# Patient Record
Sex: FEMALE | Race: BLACK OR AFRICAN AMERICAN | NOT HISPANIC OR LATINO | Employment: FULL TIME | URBAN - METROPOLITAN AREA
[De-identification: names, ages, dates, MRNs, and addresses within clinical notes are randomized per-mention and may not be internally consistent; named-entity substitution may affect disease eponyms.]

---

## 2017-05-30 ENCOUNTER — HOSPITAL ENCOUNTER (EMERGENCY)
Facility: HOSPITAL | Age: 37
Discharge: HOME | End: 2017-05-30
Attending: EMERGENCY MEDICINE | Admitting: EMERGENCY MEDICINE
Payer: MEDICAID

## 2017-05-30 VITALS
SYSTOLIC BLOOD PRESSURE: 132 MMHG | BODY MASS INDEX: 29.07 KG/M2 | HEART RATE: 60 BPM | TEMPERATURE: 98 F | RESPIRATION RATE: 18 BRPM | DIASTOLIC BLOOD PRESSURE: 79 MMHG | OXYGEN SATURATION: 100 % | HEIGHT: 61 IN | WEIGHT: 154 LBS

## 2017-05-30 DIAGNOSIS — R07.9 CHEST PAIN: ICD-10-CM

## 2017-05-30 DIAGNOSIS — R07.89 NON-CARDIAC CHEST PAIN: Primary | ICD-10-CM

## 2017-05-30 LAB
ALBUMIN SERPL BCP-MCNC: 3.6 G/DL
ALP SERPL-CCNC: 52 U/L
ALT SERPL W/O P-5'-P-CCNC: 12 U/L
ANION GAP SERPL CALC-SCNC: 10 MMOL/L
AST SERPL-CCNC: 18 U/L
B-HCG UR QL: NEGATIVE
BASOPHILS # BLD AUTO: 0.01 K/UL
BASOPHILS NFR BLD: 0.2 %
BILIRUB SERPL-MCNC: 0.4 MG/DL
BNP SERPL-MCNC: 17 PG/ML
BUN SERPL-MCNC: 13 MG/DL
CALCIUM SERPL-MCNC: 9.4 MG/DL
CHLORIDE SERPL-SCNC: 106 MMOL/L
CO2 SERPL-SCNC: 26 MMOL/L
CREAT SERPL-MCNC: 0.8 MG/DL
CRP SERPL-MCNC: 0.8 MG/L
CTP QC/QA: YES
D DIMER PPP IA.FEU-MCNC: 3.19 MG/L FEU
DIFFERENTIAL METHOD: ABNORMAL
EOSINOPHIL # BLD AUTO: 0 K/UL
EOSINOPHIL NFR BLD: 0.2 %
ERYTHROCYTE [DISTWIDTH] IN BLOOD BY AUTOMATED COUNT: 15.8 %
ERYTHROCYTE [SEDIMENTATION RATE] IN BLOOD BY WESTERGREN METHOD: 30 MM/HR
EST. GFR  (AFRICAN AMERICAN): >60 ML/MIN/1.73 M^2
EST. GFR  (NON AFRICAN AMERICAN): >60 ML/MIN/1.73 M^2
GLUCOSE SERPL-MCNC: 92 MG/DL
HCT VFR BLD AUTO: 32 %
HGB BLD-MCNC: 9.8 G/DL
LYMPHOCYTES # BLD AUTO: 1.1 K/UL
LYMPHOCYTES NFR BLD: 15.8 %
MCH RBC QN AUTO: 25.7 PG
MCHC RBC AUTO-ENTMCNC: 30.6 %
MCV RBC AUTO: 84 FL
MONOCYTES # BLD AUTO: 0.3 K/UL
MONOCYTES NFR BLD: 4.7 %
NEUTROPHILS # BLD AUTO: 5.3 K/UL
NEUTROPHILS NFR BLD: 79.1 %
PLATELET # BLD AUTO: 254 K/UL
PMV BLD AUTO: 9.6 FL
POTASSIUM SERPL-SCNC: 3.5 MMOL/L
PROT SERPL-MCNC: 7.5 G/DL
RBC # BLD AUTO: 3.82 M/UL
SODIUM SERPL-SCNC: 142 MMOL/L
TROPONIN I SERPL DL<=0.01 NG/ML-MCNC: 0.02 NG/ML
TROPONIN I SERPL DL<=0.01 NG/ML-MCNC: <0.006 NG/ML
WBC # BLD AUTO: 6.64 K/UL

## 2017-05-30 PROCEDURE — 25500020 PHARM REV CODE 255: Performed by: EMERGENCY MEDICINE

## 2017-05-30 PROCEDURE — 93010 ELECTROCARDIOGRAM REPORT: CPT | Mod: ,,, | Performed by: INTERNAL MEDICINE

## 2017-05-30 PROCEDURE — 81025 URINE PREGNANCY TEST: CPT | Performed by: EMERGENCY MEDICINE

## 2017-05-30 PROCEDURE — 63600175 PHARM REV CODE 636 W HCPCS: Performed by: EMERGENCY MEDICINE

## 2017-05-30 PROCEDURE — 99285 EMERGENCY DEPT VISIT HI MDM: CPT | Mod: ,,, | Performed by: EMERGENCY MEDICINE

## 2017-05-30 PROCEDURE — 93005 ELECTROCARDIOGRAM TRACING: CPT

## 2017-05-30 PROCEDURE — 96361 HYDRATE IV INFUSION ADD-ON: CPT

## 2017-05-30 PROCEDURE — 86140 C-REACTIVE PROTEIN: CPT

## 2017-05-30 PROCEDURE — 25000003 PHARM REV CODE 250: Performed by: STUDENT IN AN ORGANIZED HEALTH CARE EDUCATION/TRAINING PROGRAM

## 2017-05-30 PROCEDURE — 85379 FIBRIN DEGRADATION QUANT: CPT

## 2017-05-30 PROCEDURE — 80053 COMPREHEN METABOLIC PANEL: CPT

## 2017-05-30 PROCEDURE — 63600175 PHARM REV CODE 636 W HCPCS: Performed by: STUDENT IN AN ORGANIZED HEALTH CARE EDUCATION/TRAINING PROGRAM

## 2017-05-30 PROCEDURE — 83880 ASSAY OF NATRIURETIC PEPTIDE: CPT

## 2017-05-30 PROCEDURE — 84484 ASSAY OF TROPONIN QUANT: CPT

## 2017-05-30 PROCEDURE — 96375 TX/PRO/DX INJ NEW DRUG ADDON: CPT

## 2017-05-30 PROCEDURE — 85651 RBC SED RATE NONAUTOMATED: CPT

## 2017-05-30 PROCEDURE — 99285 EMERGENCY DEPT VISIT HI MDM: CPT | Mod: 25

## 2017-05-30 PROCEDURE — 85025 COMPLETE CBC W/AUTO DIFF WBC: CPT

## 2017-05-30 PROCEDURE — 96374 THER/PROPH/DIAG INJ IV PUSH: CPT

## 2017-05-30 PROCEDURE — 99282 EMERGENCY DEPT VISIT SF MDM: CPT | Mod: ,,, | Performed by: INTERNAL MEDICINE

## 2017-05-30 RX ORDER — IBUPROFEN 400 MG/1
400 TABLET ORAL EVERY 8 HOURS PRN
Qty: 30 TABLET | Refills: 0 | Status: SHIPPED | OUTPATIENT
Start: 2017-05-30 | End: 2017-08-15

## 2017-05-30 RX ORDER — IBUPROFEN 400 MG/1
400 TABLET ORAL EVERY 8 HOURS PRN
Qty: 30 TABLET | Refills: 0 | Status: SHIPPED | OUTPATIENT
Start: 2017-05-30 | End: 2017-05-30

## 2017-05-30 RX ORDER — PANTOPRAZOLE SODIUM 40 MG/1
40 TABLET, DELAYED RELEASE ORAL DAILY
Qty: 30 TABLET | Refills: 0 | Status: SHIPPED | OUTPATIENT
Start: 2017-05-30 | End: 2017-08-15

## 2017-05-30 RX ORDER — MORPHINE SULFATE 2 MG/ML
4 INJECTION, SOLUTION INTRAMUSCULAR; INTRAVENOUS
Status: COMPLETED | OUTPATIENT
Start: 2017-05-30 | End: 2017-05-30

## 2017-05-30 RX ORDER — ASPIRIN 325 MG
325 TABLET ORAL
Status: DISCONTINUED | OUTPATIENT
Start: 2017-05-30 | End: 2017-05-30

## 2017-05-30 RX ORDER — ONDANSETRON 2 MG/ML
4 INJECTION INTRAMUSCULAR; INTRAVENOUS
Status: COMPLETED | OUTPATIENT
Start: 2017-05-30 | End: 2017-05-30

## 2017-05-30 RX ORDER — DIPHENHYDRAMINE HCL 25 MG
25 CAPSULE ORAL EVERY 6 HOURS PRN
Status: DISCONTINUED | OUTPATIENT
Start: 2017-05-30 | End: 2017-05-30 | Stop reason: HOSPADM

## 2017-05-30 RX ORDER — KETOROLAC TROMETHAMINE 30 MG/ML
15 INJECTION, SOLUTION INTRAMUSCULAR; INTRAVENOUS
Status: COMPLETED | OUTPATIENT
Start: 2017-05-30 | End: 2017-05-30

## 2017-05-30 RX ADMIN — KETOROLAC TROMETHAMINE 15 MG: 30 INJECTION, SOLUTION INTRAMUSCULAR at 09:05

## 2017-05-30 RX ADMIN — DIPHENHYDRAMINE HYDROCHLORIDE 25 MG: 25 CAPSULE ORAL at 11:05

## 2017-05-30 RX ADMIN — SODIUM CHLORIDE 1000 ML: 0.9 INJECTION, SOLUTION INTRAVENOUS at 11:05

## 2017-05-30 RX ADMIN — MORPHINE SULFATE 4 MG: 2 INJECTION, SOLUTION INTRAMUSCULAR; INTRAVENOUS at 11:05

## 2017-05-30 RX ADMIN — IOHEXOL 75 ML: 350 INJECTION, SOLUTION INTRAVENOUS at 02:05

## 2017-05-30 RX ADMIN — ONDANSETRON 4 MG: 2 INJECTION INTRAMUSCULAR; INTRAVENOUS at 12:05

## 2017-05-30 NOTE — ED PROVIDER NOTES
Encounter Date: 2017    SCRIBE #1 NOTE: I, Joseph Quiros, am scribing for, and in the presence of,  Caridad Cabrera MD. I have scribed the following portions of the note - the EKG reading and the Resident attestation. Other sections scribed: CXR Reading.       History     Chief Complaint   Patient presents with    Chest Pain     on r side,     Review of patient's allergies indicates:   Allergen Reactions    Pcn [penicillins]      Mrs. Mckeon is a 36 year old female with a past medical history of HTN and pericardial effusion who presents with a chief complaint of chest pain. She reports that her chest pain began about 45 minutes ago. She states that her pain is right in the center of her chest. She describes the pain as tearing. She says that her pain has been constant since it began. She rates her pain at 10/10. Her pain improves with leaning forward and worsens with leaning back. She also states that breathing makes her pain worse. She says that her symptoms are similar to when she had a pericardial effusion in the past. She is also experiencing shortness of breath and nausea.        The history is provided by the patient.     Past Medical History:   Diagnosis Date    Chronic back pain     Hypertension      Past Surgical History:   Procedure Laterality Date     SECTION, CLASSIC      LIPOSUCTION W/ FAT INJECTION       No family history on file.  Social History   Substance Use Topics    Smoking status: Never Smoker    Smokeless tobacco: Not on file    Alcohol use No     Review of Systems   Constitutional: Negative for chills and fever.   HENT: Negative for congestion, rhinorrhea, sinus pressure and sneezing.    Eyes: Negative for visual disturbance.   Respiratory: Positive for shortness of breath. Negative for cough, chest tightness and wheezing.    Cardiovascular: Positive for chest pain. Negative for palpitations and leg swelling.   Gastrointestinal: Positive for nausea. Negative for abdominal  distention, abdominal pain, constipation, diarrhea and vomiting.   Genitourinary: Negative for decreased urine volume, difficulty urinating, dysuria, flank pain and urgency.   Musculoskeletal: Negative for neck pain and neck stiffness.   Skin: Negative for pallor, rash and wound.   Allergic/Immunologic: Negative for immunocompromised state.   Neurological: Negative for dizziness, tremors, weakness, light-headedness, numbness and headaches.   Hematological: Does not bruise/bleed easily.   Psychiatric/Behavioral: Negative for agitation and behavioral problems.       Physical Exam     Initial Vitals [05/30/17 0820]   BP Pulse Resp Temp SpO2   (!) 151/70 65 18 98.4 °F (36.9 °C) 99 %     Physical Exam    Nursing note and vitals reviewed.  Constitutional: She appears well-developed and well-nourished. She is not diaphoretic. She appears distressed.   HENT:   Head: Normocephalic.   Eyes: Conjunctivae are normal. No scleral icterus.   Neck: Normal range of motion. Neck supple. No JVD present.   Cardiovascular: Normal rate, regular rhythm, normal heart sounds and intact distal pulses. Exam reveals no gallop and no friction rub.    No murmur heard.  Pulmonary/Chest: Breath sounds normal. No respiratory distress. She has no wheezes. She has no rhonchi. She has no rales. She exhibits no tenderness.   Abdominal: Soft. Bowel sounds are normal. She exhibits no distension and no mass. There is no tenderness. There is no rebound and no guarding.   Musculoskeletal: Normal range of motion. She exhibits no edema or tenderness.   Neurological: She is alert and oriented to person, place, and time. She has normal strength and normal reflexes. She displays normal reflexes. No cranial nerve deficit or sensory deficit.   Skin: Skin is warm and dry. No rash noted. No erythema. No pallor.   Psychiatric: She has a normal mood and affect. Thought content normal.         ED Course   Procedures  Labs Reviewed   CBC W/ AUTO DIFFERENTIAL - Abnormal;  Notable for the following:        Result Value    RBC 3.82 (*)     Hemoglobin 9.8 (*)     Hematocrit 32.0 (*)     MCH 25.7 (*)     MCHC 30.6 (*)     RDW 15.8 (*)     Gran% 79.1 (*)     Lymph% 15.8 (*)     All other components within normal limits   COMPREHENSIVE METABOLIC PANEL - Abnormal; Notable for the following:     Alkaline Phosphatase 52 (*)     All other components within normal limits   SEDIMENTATION RATE, MANUAL - Abnormal; Notable for the following:     Sed Rate 30 (*)     All other components within normal limits   TROPONIN I   B-TYPE NATRIURETIC PEPTIDE   C-REACTIVE PROTEIN   D DIMER, QUANTITATIVE   TROPONIN I   POCT URINE PREGNANCY     EKG Readings: (Independently Interpreted)   NSR at 71 with flat T waves throughout. No ST elevation. No old EKG available for comparison       X-Rays:   Independently Interpreted Readings:   Chest X-Ray: Normal heart size. Clear lung fields     Medical Decision Making:   History:   Old Medical Records: I decided to obtain old medical records.  Initial Assessment:   Mrs. Mckeon is a 35 yo F w/ a PMH sig for HTN and a pericardial effusion who presents with acute constant tearing chest pain located at the center of her chest. Her pain is improved positionally. EKG findings without ischemic changes. Physical exam revealed a RRR without rubs, gallops, or murmurs.    Differential Diagnosis:   Differential diagnosis includes pericardial effusion, pericarditis, myocardial infarction, or musculoskeletal pain.       Independently Interpreted Test(s):   I have ordered and independently interpreted X-rays - see prior notes.  I have ordered and independently interpreted EKG Reading(s) - see prior notes  Clinical Tests:   Lab Tests: Ordered and Reviewed  Medical Tests: Ordered and Reviewed  ED Management:  She was seen at 0830.  She was discussed with staff at 0900.  CBC, CMP, BNP, CRP, ESR, Trop, and CXR were ordered.   She was given a 15 mg IV injection of Ketoralac.  EKG without  ischemic changes. EKG shows normal rate and rhythm. No evidence of pericarditis is present.    Lab work reviewed which was remarkable for elevated ESR. CXR without new acute findings. Patient re evaluated now with increasing chest pain and visual disturbances. Patient has no h/o of chronic headaches or migraines. She was given 4 mg of morphine for pain. Cardiology was consulted. D dimer was ordered to rule out PE.   D dimer was elevated. Will order CTA. Discussed case again with cardiology. Will remove consult for now and re order if CTA is negative for PE.   CTA reviewed. There is no evidence for a PE on CTA. In addition, no pericardial effusion was seen. Discussed case with cardiology via [employer] link. They will assess the patient. Consult re ordered.   Cardiology assessed the patient and believes the pain is non cardiac in nature and that she is safe for discharge on NSAIDs w/ close follow up with her PCP.    She will follow up closely with her PCP. Will prescribe NSAIDs and a PPI for treatment. Discussed with patient who is an agreement with plan. Also, instructed patient to return to the ED if her symptoms worsen again.   Other:   I have discussed this case with another health care provider.            Scribe Attestation:   Scribe #1: I performed the above scribed service and the documentation accurately describes the services I performed. I attest to the accuracy of the note.    Attending Attestation:   Physician Attestation Statement for Resident:  As the supervising MD   Physician Attestation Statement: I have personally seen and examined this patient.   I agree with the above history. -: 36 year old woman with Hx of pericarditis with pericardial effusion at outside hospital approximately 1 year ago. Complains of sudden onset right sided chest pain that began this morning while sitting at her desk at work. Pain worse with reclining, better with leaning forward. Worse with deep breathing and swallowing.  Denies fever, chills, cough, SOB. She does report that she yesterday walked on a treadmill for 1 hour and cannot recall the last time she exercised like this. After being in the ED for approximately a few hours, she began to complain of a visual disturbance; reported seeing halos around objects when the light was on. Denies any headache, no hx of migraines   As the supervising MD I agree with the above PE.   -: On exam, pt appears uncomfortable, sitting up in the chair leaning forward holding her chest. PERRL. Exhibits photophobia. Lungs clear to auscultation. Heart regular rate and rhythm. 2/6 systolic murmur. No gallops or rubs. Chest wall non tender.    As the supervising MD I agree with the above treatment, course, plan, and disposition.   -: 36 year old woman with sudden onset anterior chest pain that is positional and pleuritic. DDx includes ACS, Pericarditis, Pericardial effusion, less likely PE. Given her Hx of pericarditis, Sed rate and CRP were sent.   I have reviewed and agree with the residents interpretation of the following: EKG, x-rays and lab data.          Physician Attestation for Scribe:  Physician Attestation Statement for Scribe #1: I, Caridad Cabrera MD, reviewed documentation, as scribed by Joseph Quiros in my presence, and it is both accurate and complete.         Attending ED Notes:   11:36 AM: White count and CRP normal. Sed rate mildly elevated. Pt had no relief with Toradol and EKG and CXR show no findings concerning for pericarditis or pericardial effusion however given patient's Hx and her description of the pain, this is our biggest concern. Will consult cardiology for their evaluation. I have also added a D-dimer.           ED Course     Clinical Impression:   The encounter diagnosis was Chest pain.          Reggie Alvares MD  Resident  05/30/17 [id number]
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
## 2017-05-30 NOTE — ED NOTES
Pt reports blurry vision and worsening chest pain with inhalation.  Pt given pain meds will continue to monitor and make doctor aware.  Pt states she told the nurse in intake about her blurry vision.

## 2017-08-07 ENCOUNTER — TELEPHONE (OUTPATIENT)
Dept: OBSTETRICS AND GYNECOLOGY | Facility: CLINIC | Age: 37
End: 2017-08-07

## 2017-08-07 NOTE — TELEPHONE ENCOUNTER
----- Message from Margarita Lockett sent at 8/7/2017  9:56 AM CDT -----  Contact: self  Patient is wanting to schedule an initial ob appointment, Patient is requesting to be seen at [facility] for her initial due to being an employee for [employer] but is aware the dating ultrasounds are done at [facility]. Patient's LMP is unknown. Patient can be reached at [phone] or [phone].

## 2017-08-09 ENCOUNTER — HOSPITAL ENCOUNTER (EMERGENCY)
Facility: HOSPITAL | Age: 37
Discharge: HOME | End: 2017-08-09
Attending: EMERGENCY MEDICINE | Admitting: EMERGENCY MEDICINE
Payer: MEDICAID

## 2017-08-09 VITALS
BODY MASS INDEX: 30.96 KG/M2 | SYSTOLIC BLOOD PRESSURE: 138 MMHG | OXYGEN SATURATION: 100 % | HEIGHT: 61 IN | HEART RATE: 86 BPM | TEMPERATURE: 99 F | RESPIRATION RATE: 18 BRPM | DIASTOLIC BLOOD PRESSURE: 83 MMHG | WEIGHT: 164 LBS

## 2017-08-09 DIAGNOSIS — O20.0 THREATENED ABORTION IN FIRST TRIMESTER: Primary | ICD-10-CM

## 2017-08-09 DIAGNOSIS — N93.9 VAGINAL BLEEDING: ICD-10-CM

## 2017-08-09 LAB
ABO + RH BLD: NORMAL
ANION GAP SERPL CALC-SCNC: 11 MMOL/L
B-HCG UR QL: POSITIVE
BASOPHILS # BLD AUTO: 0.02 K/UL
BASOPHILS NFR BLD: 0.2 %
BLD GP AB SCN CELLS X3 SERPL QL: NORMAL
BUN SERPL-MCNC: 12 MG/DL
CALCIUM SERPL-MCNC: 10.4 MG/DL
CHLORIDE SERPL-SCNC: 103 MMOL/L
CO2 SERPL-SCNC: 23 MMOL/L
CREAT SERPL-MCNC: 0.8 MG/DL
CTP QC/QA: YES
DIFFERENTIAL METHOD: ABNORMAL
EOSINOPHIL # BLD AUTO: 0.1 K/UL
EOSINOPHIL NFR BLD: 1.5 %
ERYTHROCYTE [DISTWIDTH] IN BLOOD BY AUTOMATED COUNT: 15.6 %
EST. GFR  (AFRICAN AMERICAN): >60 ML/MIN/1.73 M^2
EST. GFR  (NON AFRICAN AMERICAN): >60 ML/MIN/1.73 M^2
GLUCOSE SERPL-MCNC: 99 MG/DL
HCG INTACT+B SERPL-ACNC: 9975 MIU/ML
HCT VFR BLD AUTO: 34.3 %
HGB BLD-MCNC: 10.7 G/DL
LYMPHOCYTES # BLD AUTO: 1.4 K/UL
LYMPHOCYTES NFR BLD: 16.6 %
MCH RBC QN AUTO: 25.8 PG
MCHC RBC AUTO-ENTMCNC: 31.2 G/DL
MCV RBC AUTO: 83 FL
MONOCYTES # BLD AUTO: 0.5 K/UL
MONOCYTES NFR BLD: 6.2 %
NEUTROPHILS # BLD AUTO: 6.1 K/UL
NEUTROPHILS NFR BLD: 75.4 %
PLATELET # BLD AUTO: 302 K/UL
PMV BLD AUTO: 9.9 FL
POTASSIUM SERPL-SCNC: 4.1 MMOL/L
RBC # BLD AUTO: 4.14 M/UL
SODIUM SERPL-SCNC: 137 MMOL/L
WBC # BLD AUTO: 8.12 K/UL

## 2017-08-09 PROCEDURE — 86901 BLOOD TYPING SEROLOGIC RH(D): CPT

## 2017-08-09 PROCEDURE — 99284 EMERGENCY DEPT VISIT MOD MDM: CPT | Mod: ,,, | Performed by: EMERGENCY MEDICINE

## 2017-08-09 PROCEDURE — 80048 BASIC METABOLIC PNL TOTAL CA: CPT

## 2017-08-09 PROCEDURE — 85025 COMPLETE CBC W/AUTO DIFF WBC: CPT

## 2017-08-09 PROCEDURE — 81025 URINE PREGNANCY TEST: CPT | Performed by: EMERGENCY MEDICINE

## 2017-08-09 PROCEDURE — 99284 EMERGENCY DEPT VISIT MOD MDM: CPT | Mod: 25

## 2017-08-09 PROCEDURE — 86900 BLOOD TYPING SEROLOGIC ABO: CPT

## 2017-08-09 PROCEDURE — 84702 CHORIONIC GONADOTROPIN TEST: CPT

## 2017-08-09 NOTE — ED NOTES
Pt returned from ultrasound. Pt states that she walked to ultrasound with escort. Escort did not ask for ticket to ride. Nurse assumed pt left after being seen. Pt placed back on track board.

## 2017-08-09 NOTE — ED TRIAGE NOTES
Pt reports she found out on Saturday she is pregnant. Pt reports she started having a sharp pain yesterday in her abdomen. Pt reports she started having vaginal bleeding today and lower abdominal cramping.

## 2017-08-09 NOTE — ED PROVIDER NOTES
"Encounter Date: 2017    SCRIBE #1 NOTE: I, Justina Pope, am scribing for, and in the presence of, Dr. Mon.       History     Chief Complaint   Patient presents with    Vaginal Bleeding     positive pregnancy test on Saturday. Bleeding started "just now".      Time seen by provider: 10:30 AM    This is a 36 y.o. female with a history of hypertension who presents with complaint of 1 episode of mild vaginal bleeding which occurred prior to arrival. She indicates small amounts of blood in toilet and on the toilet paper. The patient reports associated abdominal cramping.  P/G/A is 1/2/0. She indicates that she is unsure of how far along her current pregnancy is.      The history is provided by the patient.     Review of patient's allergies indicates:   Allergen Reactions    Morphine Hives    Pcn [penicillins]      Past Medical History:   Diagnosis Date    Chronic back pain     Elevated cholesterol     Hypertension     Impaired glucose in pregnancy, antepartum     Migraine      Past Surgical History:   Procedure Laterality Date     SECTION, CLASSIC      x3; all at term    LIPOSUCTION W/ FAT INJECTION       Family History   Problem Relation Age of Onset    Breast cancer Neg Hx     Colon cancer Neg Hx     Ovarian cancer Neg Hx      Social History   Substance Use Topics    Smoking status: Never Smoker    Smokeless tobacco: Never Used    Alcohol use No     Review of Systems   Gastrointestinal:        Abdominal cramping.   Genitourinary: Positive for vaginal bleeding (Mild).       Physical Exam     Initial Vitals [17 0944]   BP Pulse Resp Temp SpO2   (!) 141/82 82 17 98.9 °F (37.2 °C) 99 %      MAP       101.67         Physical Exam    Nursing note and vitals reviewed.  Constitutional: She appears well-developed and well-nourished. She is not diaphoretic. No distress.   Comfortable and well-appearing.   HENT:   Mouth/Throat: Mucous membranes are normal.   Cardiovascular: Normal rate and " regular rhythm. Exam reveals no friction rub.    No murmur heard.  Pulmonary/Chest: Breath sounds normal. No respiratory distress. She has no wheezes. She has no rhonchi. She has no rales.   Comfortable respirations.   Abdominal: There is tenderness (Mild suprapubic).   Genitourinary:   Genitourinary Comments: Small amount of pinkish blood in the vault. Negative for clot or tissue. Cervical os closed. Mild uterine tenderness.   Neurological: She is alert and oriented to person, place, and time. No cranial nerve deficit or sensory deficit.         ED Course   Procedures  Labs Reviewed   CBC W/ AUTO DIFFERENTIAL - Abnormal; Notable for the following:        Result Value    Hemoglobin 10.7 (*)     Hematocrit 34.3 (*)     MCH 25.8 (*)     MCHC 31.2 (*)     RDW 15.6 (*)     Gran% 75.4 (*)     Lymph% 16.6 (*)     All other components within normal limits   POCT URINE PREGNANCY - Abnormal; Notable for the following:     POC Preg Test, Ur Positive (*)     All other components within normal limits   HCG, QUANTITATIVE, PREGNANCY   BASIC METABOLIC PANEL   TYPE & SCREEN             Medical Decision Making:   History:   Old Medical Records: I decided to obtain old medical records.  Initial Assessment:   37 yo f, here with 1st trimester pregnancy/VB, HD stable, mild lower abd cramping.  No IUP on my bedside sono. Cervical os closed, minimal VB on exam    Differential Diagnosis:   Threatened ab vs ectopic  Clinical Tests:   Lab Tests: Ordered and Reviewed  Radiological Study: Ordered and Reviewed  ED Management:  -labs  -sono            Scribe Attestation:   Scribe #1: I performed the above scribed service and the documentation accurately describes the services I performed. I attest to the accuracy of the note.    Attending Attestation:           Physician Attestation for Scribe:  Physician Attestation Statement for Scribe #1: I, Dr. Mon, reviewed documentation, as scribed by Justina Pope in my presence, and it is both accurate  and complete.                 ED Course     12:54 PM  Early IUP  Results discussed with pt  D/c'd home with close OB f/u    Clinical Impression:   The primary encounter diagnosis was Threatened  in first trimester. A diagnosis of Vaginal bleeding was also pertinent to this visit.    Disposition:   Disposition: Discharged  Condition: Stable                        Monica Mon MD  08/15/17 8542

## 2017-08-09 NOTE — ED NOTES
Pt not in the designated area where assigned. Type and screen is pending due to not having enough blood in pink top.

## 2017-08-14 ENCOUNTER — TELEPHONE (OUTPATIENT)
Dept: OBSTETRICS AND GYNECOLOGY | Facility: CLINIC | Age: 37
End: 2017-08-14

## 2017-08-14 DIAGNOSIS — N91.2 AMENORRHEA: Primary | ICD-10-CM

## 2017-08-14 NOTE — TELEPHONE ENCOUNTER
----- Message from Tavo Byers sent at 8/14/2017  9:33 AM CDT -----  Contact: Pt  X_ 1st Request  _ 2nd Request  _ 3rd Request    Who: NATHAN HUMPHREY [[id number]]    Why: Patient would like to speak with staff in regards to tomorrow appointment; patient states she does not want to change it, pregnancy confirm by Ed. Patient LMP 6/22. Patient states she would like to see about dating ultrasound if possible    What Number to Call Back: [phone]    When to Expect a call back: (Before the end of the day)  -- if call after 3:00 call back will be tomorrow.

## 2017-08-15 ENCOUNTER — TELEPHONE (OUTPATIENT)
Dept: MATERNAL FETAL MEDICINE | Facility: CLINIC | Age: 37
End: 2017-08-15

## 2017-08-15 ENCOUNTER — OFFICE VISIT (OUTPATIENT)
Dept: OBSTETRICS AND GYNECOLOGY | Facility: CLINIC | Age: 37
End: 2017-08-15
Payer: MEDICAID

## 2017-08-15 ENCOUNTER — LAB VISIT (OUTPATIENT)
Dept: LAB | Facility: OTHER | Age: 37
End: 2017-08-15
Attending: OBSTETRICS & GYNECOLOGY
Payer: MEDICAID

## 2017-08-15 ENCOUNTER — TELEPHONE (OUTPATIENT)
Dept: OBSTETRICS AND GYNECOLOGY | Facility: CLINIC | Age: 37
End: 2017-08-15

## 2017-08-15 VITALS
WEIGHT: 165.56 LBS | HEIGHT: 61 IN | DIASTOLIC BLOOD PRESSURE: 90 MMHG | SYSTOLIC BLOOD PRESSURE: 140 MMHG | BODY MASS INDEX: 31.26 KG/M2

## 2017-08-15 DIAGNOSIS — O09.90 HIGH RISK PREGNANCY, ANTEPARTUM: ICD-10-CM

## 2017-08-15 DIAGNOSIS — O09.90 HIGH RISK PREGNANCY, ANTEPARTUM: Primary | ICD-10-CM

## 2017-08-15 LAB
ABO + RH BLD: NORMAL
ALBUMIN SERPL BCP-MCNC: 3.6 G/DL
ALP SERPL-CCNC: 51 U/L
ALT SERPL W/O P-5'-P-CCNC: 13 U/L
ANION GAP SERPL CALC-SCNC: 9 MMOL/L
AST SERPL-CCNC: 16 U/L
BILIRUB SERPL-MCNC: 0.3 MG/DL
BLD GP AB SCN CELLS X3 SERPL QL: NORMAL
BUN SERPL-MCNC: 9 MG/DL
CALCIUM SERPL-MCNC: 9.5 MG/DL
CHLORIDE SERPL-SCNC: 103 MMOL/L
CO2 SERPL-SCNC: 22 MMOL/L
CREAT SERPL-MCNC: 0.7 MG/DL
EST. GFR  (AFRICAN AMERICAN): >60 ML/MIN/1.73 M^2
EST. GFR  (NON AFRICAN AMERICAN): >60 ML/MIN/1.73 M^2
GLUCOSE SERPL-MCNC: 137 MG/DL
GLUCOSE SERPL-MCNC: 139 MG/DL
HCG INTACT+B SERPL-ACNC: NORMAL MIU/ML
LDH SERPL L TO P-CCNC: 190 U/L
POTASSIUM SERPL-SCNC: 3.4 MMOL/L
PROT SERPL-MCNC: 7.7 G/DL
SODIUM SERPL-SCNC: 134 MMOL/L

## 2017-08-15 PROCEDURE — 86703 HIV-1/HIV-2 1 RESULT ANTBDY: CPT

## 2017-08-15 PROCEDURE — 86850 RBC ANTIBODY SCREEN: CPT

## 2017-08-15 PROCEDURE — 83615 LACTATE (LD) (LDH) ENZYME: CPT

## 2017-08-15 PROCEDURE — 82950 GLUCOSE TEST: CPT

## 2017-08-15 PROCEDURE — 84702 CHORIONIC GONADOTROPIN TEST: CPT

## 2017-08-15 PROCEDURE — 36415 COLL VENOUS BLD VENIPUNCTURE: CPT

## 2017-08-15 PROCEDURE — 80053 COMPREHEN METABOLIC PANEL: CPT

## 2017-08-15 PROCEDURE — 86762 RUBELLA ANTIBODY: CPT

## 2017-08-15 PROCEDURE — 99999 PR PBB SHADOW E&M-EST. PATIENT-LVL III: CPT | Mod: PBBFAC,,, | Performed by: OBSTETRICS & GYNECOLOGY

## 2017-08-15 PROCEDURE — 87340 HEPATITIS B SURFACE AG IA: CPT

## 2017-08-15 PROCEDURE — 99204 OFFICE O/P NEW MOD 45 MIN: CPT | Mod: TH,S$PBB,, | Performed by: OBSTETRICS & GYNECOLOGY

## 2017-08-15 PROCEDURE — 86900 BLOOD TYPING SEROLOGIC ABO: CPT

## 2017-08-15 PROCEDURE — 3008F BODY MASS INDEX DOCD: CPT | Mod: ,,, | Performed by: OBSTETRICS & GYNECOLOGY

## 2017-08-15 PROCEDURE — 86592 SYPHILIS TEST NON-TREP QUAL: CPT

## 2017-08-15 PROCEDURE — 81220 CFTR GENE COM VARIANTS: CPT

## 2017-08-15 RX ORDER — PYRIDOXINE HCL (VITAMIN B6) 25 MG
TABLET ORAL
Qty: 40 TABLET | Refills: 3 | Status: SHIPPED | OUTPATIENT
Start: 2017-08-15 | End: 2017-10-24

## 2017-08-15 NOTE — PROGRESS NOTES
New OB    SUBJECTIVE:     Chief Complaint: Initial Prenatal Visit       History of Present Illness:  Pt is a 36 y.o. female who presents complaining of amenorrhea.  She is 10w5d based on her LMP.    She does not vaginal bleeding/spotting, does not abdominal pain,  does have nausea, does have vomiting. Patient recently went to the ED for vaginal bleeding that has resolved.  HCG was 9975 and ultrasound showed an intrauterine pregnancy without a fetal pole or yolk sac. Repeat u/s scheduled for .      Significant History:   CHTN: was on HCTZ and norvasc; PCP removed HCTZ  Impaired glucose: Recent 2017 A1c of 5.9  High cholesterol- labs at outside facility  AMA  Migraine headaches- appt with neurology on Friday  History of CS x 3- patient reports could not tie tubes last pregnancy due to scar tissue    Last pap: Normal per patient, 2017 with NP in [facility]; no history of abnormal    Review of patient's allergies indicates:   Allergen Reactions    Morphine Hives    Pcn [penicillins]        Past Medical History:   Diagnosis Date    Chronic back pain     Elevated cholesterol     Hypertension     Impaired glucose in pregnancy, antepartum     Migraine      Past Surgical History:   Procedure Laterality Date     SECTION, CLASSIC      x3; all at term    LIPOSUCTION W/ FAT INJECTION       OB History      Para Term  AB Living    5 3 3   1 3    SAB TAB Ectopic Multiple Live Births    1       3        Family History   Problem Relation Age of Onset    Breast cancer Neg Hx     Colon cancer Neg Hx     Ovarian cancer Neg Hx      Social History   Substance Use Topics    Smoking status: Never Smoker    Smokeless tobacco: Never Used    Alcohol use No       Current Outpatient Prescriptions   Medication Sig    amlodipine (NORVASC) 5 MG tablet Take 5 mg by mouth once daily.    doxylamine succinate (ULTRA SLEEP, DOXYLAMINE SUCC,) 25 mg tablet Take 1 tablet nightly. If still nauseated, add 1/2 a  tablet in the morning and 1/2 a tablet in the afternoon.    PNV,calcium 72-iron-folic acid 27 mg iron- 1 mg Tab Take 1 tablet (1 each total) by mouth once daily.    pyridoxine, vitamin B6, (B-6) 25 MG Tab Take 1 tablet nightly. If still nauseated, add 1/2 a tablet in the morning and 1/2 a tablet in the afternoon.     No current facility-administered medications for this visit.        Review of Systems:  Review of Systems   Constitutional: Negative for fever and unexpected weight change.   Respiratory: Negative for shortness of breath.    Cardiovascular: Negative for chest pain.   Gastrointestinal: Positive for nausea and vomiting. Negative for constipation and diarrhea.   Genitourinary: Negative for dysuria, frequency, menstrual problem, pelvic pain, urgency, vaginal bleeding, vaginal discharge and vaginal pain.   Psychiatric/Behavioral: The patient is not nervous/anxious.         OBJECTIVE:     Physical Exam:  Physical Exam   Constitutional: She is oriented to person, place, and time. She appears well-developed and well-nourished.   Neck: Normal range of motion. Neck supple. No tracheal deviation present. No thyromegaly present.   Cardiovascular: Normal rate, regular rhythm and normal heart sounds.    Pulmonary/Chest: Effort normal and breath sounds normal. Right breast exhibits no inverted nipple, no mass, no nipple discharge, no skin change and no tenderness. Left breast exhibits no inverted nipple, no mass, no nipple discharge, no skin change and no tenderness. Breasts are symmetrical.   Abdominal: Soft.   Genitourinary: Vagina normal. No labial fusion. There is no rash, tenderness, lesion or injury on the right labia. There is no rash, tenderness, lesion or injury on the left labia. Uterus is enlarged. Uterus is not deviated, not fixed and not tender. Cervix exhibits no motion tenderness, no discharge and no friability. Right adnexum displays no mass, no tenderness and no fullness. Left adnexum displays no  mass, no tenderness and no fullness. No erythema, tenderness or bleeding in the vagina. No foreign body in the vagina. No signs of injury around the vagina. No vaginal discharge found.   Genitourinary Comments: Fibroid uterus   Neurological: She is alert and oriented to person, place, and time.   Psychiatric: She has a normal mood and affect. Her behavior is normal. Judgment and thought content normal.   Nursing note and vitals reviewed.        ASSESSMENT:       ICD-10-CM ICD-9-CM    1. High risk pregnancy, antepartum O09.90 V23.9 Type & Screen - Ob Profile      HIV-1 and HIV-2 antibodies      RPR      Hepatitis B surface antigen      Rubella antibody, IgG      Urine culture      C. trachomatis/N. gonorrhoeae by AMP DNA Cervix      doxylamine succinate (ULTRA SLEEP, DOXYLAMINE SUCC,) 25 mg tablet      pyridoxine, vitamin B6, (B-6) 25 MG Tab      PNV,calcium 72-iron-folic acid 27 mg iron- 1 mg Tab      Comprehensive metabolic panel      Protein / creatinine ratio, urine      Lactate dehydrogenase      OB Glucose Screen      Cystic Fibrosis Mutation Panel      hCG, quantitative      Ambulatory consult to Maternal Fetal Medicine      US MF Procedure (Viewpoint)          Plan:      Emily was seen today for initial prenatal visit.    Diagnoses and all orders for this visit:    High risk pregnancy, antepartum  -     Type & Screen - Ob Profile; Future  -     HIV-1 and HIV-2 antibodies; Future  -     RPR; Future  -     Hepatitis B surface antigen; Future  -     Rubella antibody, IgG; Future  -     Urine culture  -     C. trachomatis/N. gonorrhoeae by AMP DNA Cervix  -     doxylamine succinate (ULTRA SLEEP, DOXYLAMINE SUCC,) 25 mg tablet; Take 1 tablet nightly. If still nauseated, add 1/2 a tablet in the morning and 1/2 a tablet in the afternoon.  -     pyridoxine, vitamin B6, (B-6) 25 MG Tab; Take 1 tablet nightly. If still nauseated, add 1/2 a tablet in the morning and 1/2 a tablet in the afternoon.  -     PNV,calcium  72-iron-folic acid 27 mg iron- 1 mg Tab; Take 1 tablet (1 each total) by mouth once daily.  -     Comprehensive metabolic panel; Future  -     Protein / creatinine ratio, urine  -     Lactate dehydrogenase; Future  -     OB Glucose Screen; Future  -     Cystic Fibrosis Mutation Panel; Future  -     hCG, quantitative; Future  -     Ambulatory consult to Maternal Fetal Medicine  -     US MFM Procedure (Viewpoint); Future        Orders Placed This Encounter   Procedures    Urine culture    C. trachomatis/N. gonorrhoeae by AMP DNA Cervix    HIV-1 and HIV-2 antibodies    RPR    Hepatitis B surface antigen    Rubella antibody, IgG    Comprehensive metabolic panel    Protein / creatinine ratio, urine    Lactate dehydrogenase    OB Glucose Screen    Cystic Fibrosis Mutation Panel    hCG, quantitative    Ambulatory consult to Maternal Fetal Medicine    Type & Screen - Ob Profile    US MFM Procedure (Viewpoint)       High risk pregnancy   - U/S and hCG done in the clinic; intrauterine sac with no CRL or yolk sac  - briefly discussed the possibility of miscarriage; will repeat hCG today  - Dating U/S: scheduled  - Recommended Prenatal vitamins with with at least 400 mcg of folic acid (sent to pharmacy)  - First trimester labs: ordered with CF; patient desires Maternti 21  - doxylamine and B6 to pharmacy for N/V    CHTN:   - recommend continue norvasc  - HCTZ discontinued by PCP  - BP slightly elevated today: 140/90  - counseled on the risks of CHTN in pregnancy    Impaired glucose:   - A1c of 5.9 recently;  - ob glucose screen ordered today  - counseled on diabetes in pregnancy    AMA:  - patient is interested in Materniti 21; message sent to MFM  - counseling on the risks of advanced maternal age in pregnancy    History of CS x 3:  - patient reports too difficult to tie tubes with last CS due scar tissue  - previous CS were done at [facility] prior to [place]; op notes not available  - BTL paper  signed today    Referral to [facility] for high risk pregnancy      Counseling: Patient was counseled today on routine 1st trimester precautions, including vaginal bleeding and abdominal pain. We also discussed proper weight gain based on the [place] [employer]'s recommendations based on her pre-pregnancy weight, foods to avoid in pregnancy (i.e. sushi, fish that are high in mercury, cold deli meat, and unpasteurized cheeses), environmental precautions such as cat litter, and prenatal vitamin options (i.e. stool softener, DHA).  Patient given A to Z handbook of pregnancy.    Return in about 1 month (around 9/15/2017) for ob check.    Iliana Domínguez

## 2017-08-15 NOTE — TELEPHONE ENCOUNTER
Nurse phoned patient to discuss her interest in [id number]. Nurse let patient know that once she has her dating ultrasound on August 23rd, she can call [facility] back at [phone] to set up her [id number] blood draw.    Patient verbalized understanding of information received.    ----- Message from Iliana Domínguez MD sent at 8/15/2017 11:21 AM CDT -----  Patient would like to have materniti 21.  Thanks!

## 2017-08-15 NOTE — TELEPHONE ENCOUNTER
----- Message from Monica Brice sent at 8/14/2017  9:37 AM CDT -----  Contact: NATHAN HUMPHREY [[id number]]  x_  1st Request  _  2nd Request  _  3rd Request        Who: NATHAN HUMPHREY [[id number]]    Why: Pt states she would like to schedule her initial OB appointment Pt LMP was 6/5. Patient had a positive pregnancy test on 8/9 from ED. Patient states she would like to keep her same appt for tomorrow if possible.     What Number to Call Back: [phone] or [phone]    When to Expect a call back: (Before the end of the day)   -- if call after 3:00 call back will be tomorrow.

## 2017-08-15 NOTE — TELEPHONE ENCOUNTER
Called pt and informed her that she could still keep her appt for today but we will not be able to schedule her dating us for today because they have no available appts. And also because she might not be as far along with pregnancy as she thinks.

## 2017-08-16 ENCOUNTER — TELEPHONE (OUTPATIENT)
Dept: OBSTETRICS AND GYNECOLOGY | Facility: CLINIC | Age: 37
End: 2017-08-16

## 2017-08-16 LAB
C TRACH DNA SPEC QL NAA+PROBE: NOT DETECTED
CREAT UR-MCNC: 153 MG/DL
HBV SURFACE AG SERPL QL IA: NEGATIVE
HIV 1+2 AB+HIV1 P24 AG SERPL QL IA: NEGATIVE
N GONORRHOEA DNA SPEC QL NAA+PROBE: NOT DETECTED
PROT UR-MCNC: 18 MG/DL
PROT/CREAT RATIO, UR: 0.12
RPR SER QL: NORMAL
RUBV IGG SER-ACNC: 27.6 IU/ML
RUBV IGG SER-IMP: REACTIVE

## 2017-08-16 NOTE — TELEPHONE ENCOUNTER
Called pt and informed her that I talked to Dr. Domínguez and she said that spotting is normal in the beginning of pregnancy and to just watch to make sure that she does not start soaking up a pad with blood. Also informed her that her levels have gone up so that is a good sign. But did tell her that the levels rising does not mean that this pregnancy will not be a miscarriage. We will f/u with pt dating us on 8/23. Pt verbalized understanding.

## 2017-08-16 NOTE — TELEPHONE ENCOUNTER
Called pt and she states that she started spotting again today while at work. Pt denied pelvic pain. Pt is having mild cramps that feels like period cramping. No head aches or spots in vision. Bleeding does not fill pad within an hour. Pt went to ER yesterday. Pt was also seen by us in clinic yesterday. Will advise Dr. Domínguez.

## 2017-08-18 LAB — CFTR MUT ANL BLD/T: NORMAL

## 2017-08-19 LAB
BACTERIA UR CULT: NORMAL
BACTERIA UR CULT: NORMAL

## 2017-08-21 ENCOUNTER — PATIENT MESSAGE (OUTPATIENT)
Dept: OBSTETRICS AND GYNECOLOGY | Facility: CLINIC | Age: 37
End: 2017-08-21

## 2017-08-21 DIAGNOSIS — N39.0 URINARY TRACT INFECTION WITHOUT HEMATURIA, SITE UNSPECIFIED: Primary | ICD-10-CM

## 2017-08-21 RX ORDER — NITROFURANTOIN 25; 75 MG/1; MG/1
100 CAPSULE ORAL 2 TIMES DAILY
Qty: 14 CAPSULE | Refills: 0 | Status: SHIPPED | OUTPATIENT
Start: 2017-08-21 | End: 2017-08-28

## 2017-08-23 ENCOUNTER — PROCEDURE VISIT (OUTPATIENT)
Dept: OBSTETRICS AND GYNECOLOGY | Facility: CLINIC | Age: 37
End: 2017-08-23
Payer: MEDICAID

## 2017-08-23 DIAGNOSIS — D25.9 LEIOMYOMA IN PREGNANCY, UTERINE, ANTEPARTUM: ICD-10-CM

## 2017-08-23 DIAGNOSIS — Z36.89 ESTABLISH GESTATIONAL AGE, ULTRASOUND: ICD-10-CM

## 2017-08-23 DIAGNOSIS — O34.10 LEIOMYOMA IN PREGNANCY, UTERINE, ANTEPARTUM: ICD-10-CM

## 2017-08-23 DIAGNOSIS — N91.2 AMENORRHEA: ICD-10-CM

## 2017-08-23 PROCEDURE — 76817 TRANSVAGINAL US OBSTETRIC: CPT | Mod: PBBFAC | Performed by: OBSTETRICS & GYNECOLOGY

## 2017-08-23 PROCEDURE — 76817 TRANSVAGINAL US OBSTETRIC: CPT | Mod: 26,S$PBB,, | Performed by: OBSTETRICS & GYNECOLOGY

## 2017-08-23 NOTE — PROCEDURES
Procedures   Obstetrical ultrasound completed today.  See report in imaging section of [facility].

## 2017-09-06 ENCOUNTER — HOSPITAL ENCOUNTER (EMERGENCY)
Facility: OTHER | Age: 37
Discharge: HOME | End: 2017-09-06
Attending: EMERGENCY MEDICINE
Payer: MEDICAID

## 2017-09-06 VITALS
RESPIRATION RATE: 16 BRPM | WEIGHT: 166 LBS | SYSTOLIC BLOOD PRESSURE: 198 MMHG | OXYGEN SATURATION: 100 % | BODY MASS INDEX: 31.34 KG/M2 | DIASTOLIC BLOOD PRESSURE: 88 MMHG | TEMPERATURE: 99 F | HEART RATE: 69 BPM | HEIGHT: 61 IN

## 2017-09-06 DIAGNOSIS — R51.9 ACUTE NONINTRACTABLE HEADACHE, UNSPECIFIED HEADACHE TYPE: Primary | ICD-10-CM

## 2017-09-06 DIAGNOSIS — R10.9 ABDOMINAL PAIN IN PREGNANCY, SECOND TRIMESTER: ICD-10-CM

## 2017-09-06 DIAGNOSIS — O26.892 ABDOMINAL PAIN IN PREGNANCY, SECOND TRIMESTER: ICD-10-CM

## 2017-09-06 DIAGNOSIS — R11.2 NON-INTRACTABLE VOMITING WITH NAUSEA, UNSPECIFIED VOMITING TYPE: ICD-10-CM

## 2017-09-06 DIAGNOSIS — R07.9 CHEST PAIN: ICD-10-CM

## 2017-09-06 LAB
B-HCG UR QL: POSITIVE
BILIRUB UR QL STRIP: NEGATIVE
CLARITY UR: CLEAR
COLOR UR: YELLOW
CTP QC/QA: YES
GLUCOSE UR QL STRIP: NEGATIVE
HGB UR QL STRIP: NEGATIVE
KETONES UR QL STRIP: NEGATIVE
LEUKOCYTE ESTERASE UR QL STRIP: NEGATIVE
NITRITE UR QL STRIP: NEGATIVE
PH UR STRIP: 7 [PH] (ref 5–8)
PROT UR QL STRIP: NEGATIVE
SP GR UR STRIP: 1.02 (ref 1–1.03)
URN SPEC COLLECT METH UR: NORMAL
UROBILINOGEN UR STRIP-ACNC: NEGATIVE EU/DL

## 2017-09-06 PROCEDURE — 25000003 PHARM REV CODE 250: Performed by: PHYSICIAN ASSISTANT

## 2017-09-06 PROCEDURE — 81003 URINALYSIS AUTO W/O SCOPE: CPT

## 2017-09-06 PROCEDURE — 93005 ELECTROCARDIOGRAM TRACING: CPT

## 2017-09-06 PROCEDURE — 81025 URINE PREGNANCY TEST: CPT | Performed by: EMERGENCY MEDICINE

## 2017-09-06 PROCEDURE — 99284 EMERGENCY DEPT VISIT MOD MDM: CPT | Mod: 25

## 2017-09-06 PROCEDURE — 93010 ELECTROCARDIOGRAM REPORT: CPT | Mod: ,,, | Performed by: INTERNAL MEDICINE

## 2017-09-06 RX ORDER — FAMOTIDINE 20 MG/1
20 TABLET, FILM COATED ORAL
Status: COMPLETED | OUTPATIENT
Start: 2017-09-06 | End: 2017-09-06

## 2017-09-06 RX ORDER — METOCLOPRAMIDE 10 MG/1
10 TABLET ORAL EVERY 6 HOURS PRN
Qty: 30 TABLET | Refills: 0 | Status: SHIPPED | OUTPATIENT
Start: 2017-09-06 | End: 2017-10-03 | Stop reason: SDUPTHER

## 2017-09-06 RX ORDER — ACETAMINOPHEN 500 MG
1000 TABLET ORAL
Status: COMPLETED | OUTPATIENT
Start: 2017-09-06 | End: 2017-09-06

## 2017-09-06 RX ORDER — FAMOTIDINE 20 MG/1
20 TABLET, FILM COATED ORAL 2 TIMES DAILY
Qty: 20 TABLET | Refills: 0 | Status: SHIPPED | OUTPATIENT
Start: 2017-09-06 | End: 2017-10-24

## 2017-09-06 RX ORDER — METOCLOPRAMIDE 10 MG/1
10 TABLET ORAL
Status: COMPLETED | OUTPATIENT
Start: 2017-09-06 | End: 2017-09-06

## 2017-09-06 RX ORDER — ACETAMINOPHEN 500 MG
500 TABLET ORAL
Status: COMPLETED | OUTPATIENT
Start: 2017-09-06 | End: 2017-09-06

## 2017-09-06 RX ADMIN — ACETAMINOPHEN 500 MG: 500 TABLET ORAL at 08:09

## 2017-09-06 RX ADMIN — FAMOTIDINE 20 MG: 20 TABLET, FILM COATED ORAL at 08:09

## 2017-09-06 RX ADMIN — METOCLOPRAMIDE 10 MG: 10 TABLET ORAL at 08:09

## 2017-09-06 RX ADMIN — ACETAMINOPHEN 1000 MG: 500 TABLET ORAL at 08:09

## 2017-09-06 NOTE — ED NOTES
"Pt reports to triage nurse stating. " I forgot to tell you I have chest pains earlier". Pt denies new onset stating," My chest has been hurting since yesterday". + Left sided intermittent chest "presure" reports pain 6/10 on pain scale. Denies SOB at this time. ECG ordered at this time.  "

## 2017-09-07 NOTE — ED TRIAGE NOTES
Pt complaining of headache since last night, took tylenol and it helped but then headache came back.  Pt states began having abdominal pain and chest pain today.  Nausea, and left ear pain as well.

## 2017-09-07 NOTE — ED PROVIDER NOTES
"Encounter Date: 2017       History     Chief Complaint   Patient presents with    Abdominal Pain     + constant generalzied abdominal pains after eating breakfast this morning. + generalzied HA " I went to the [facility] and they gave me two Tylenols which helped only for about two hours". + left ear pain w/ new onset this morning. + nausea w/out vomiting. Last BM reported yesterday as normal. Denies dysuria or hematuria     37-year-old female who is approximately 15 weeks gestation and admits to being  A2 presents emergency department with multiple complaints including upper abdominal pain, chest pain and headache.  She also reports nausea and vomiting.  States the symptoms started this morning after breakfast.  She states that she took Tylenol which relieved her headache however it came back shortly after.  He states that she is currently on doxycycline just for her nausea and vomiting however admits that she "is about to stop taking it because it causes me to be constipated." She denies any vaginal bleeding, discharge, urinary symptoms, fever, chills, shortness of breath. She reports palpation ultrasound was obtained by her OB/GYN a few weeks ago.  Her last OB/GYN visit was on the  of last month      The history is provided by the patient.     Review of patient's allergies indicates:   Allergen Reactions    Morphine Hives    Pcn [penicillins]      Past Medical History:   Diagnosis Date    Chronic back pain     Elevated cholesterol     Hypertension     Impaired glucose in pregnancy, antepartum     Migraine      Past Surgical History:   Procedure Laterality Date     SECTION, CLASSIC      x3; all at term    LIPOSUCTION W/ FAT INJECTION       Family History   Problem Relation Age of Onset    Breast cancer Neg Hx     Colon cancer Neg Hx     Ovarian cancer Neg Hx      Social History   Substance Use Topics    Smoking status: Never Smoker    Smokeless tobacco: Never Used    Alcohol use " No     Review of Systems   Constitutional: Negative for chills and fever.   HENT: Negative for sore throat.    Respiratory: Negative for shortness of breath.    Cardiovascular: Positive for chest pain. Negative for leg swelling.   Gastrointestinal: Positive for abdominal pain, nausea and vomiting. Negative for diarrhea.   Genitourinary: Negative for dysuria.   Musculoskeletal: Negative for back pain.   Skin: Negative for rash.   Neurological: Positive for headaches. Negative for dizziness, syncope, weakness, light-headedness and numbness.   Hematological: Does not bruise/bleed easily.       Physical Exam     Initial Vitals [09/06/17 1726]   BP Pulse Resp Temp SpO2   (!) 165/78 71 16 98.4 °F (36.9 °C) 100 %      MAP       107         Physical Exam    Nursing note and vitals reviewed.  Constitutional: Vital signs are normal. She appears well-developed and well-nourished.  Non-toxic appearance. No distress.   HENT:   Head: Normocephalic and atraumatic.   Right Ear: External ear normal.   Left Ear: External ear normal.   Nose: Nose normal.   Mouth/Throat: Oropharynx is clear and moist.   Eyes: Conjunctivae and lids are normal. No scleral icterus.   Neck: Normal range of motion and phonation normal. Neck supple.   Cardiovascular: Normal rate, regular rhythm and normal heart sounds. Exam reveals no gallop and no friction rub.    No murmur heard.  No pitting edema   Pulmonary/Chest: Effort normal and breath sounds normal. No respiratory distress. She has no decreased breath sounds. She has no wheezes. She has no rhonchi. She has no rales.   Abdominal: Soft. Normal appearance and bowel sounds are normal. She exhibits distension (gravid uterus). There is no tenderness. There is no rigidity, no rebound, no guarding, no CVA tenderness, no tenderness at McBurney's point and negative Chapa's sign.       Musculoskeletal: Normal range of motion.   No obvious deformities, moving all extremities, normal gait   Neurological: She is  alert and oriented to person, place, and time. She has normal strength. No sensory deficit.   Skin: Skin is warm, dry and intact. No lesion and no rash noted. No erythema.   Psychiatric: She has a normal mood and affect. Her speech is normal and behavior is normal. Judgment normal. Cognition and memory are normal.         ED Course   Procedures  Labs Reviewed   POCT URINE PREGNANCY - Abnormal; Notable for the following:        Result Value    POC Preg Test, Ur Positive (*)     All other components within normal limits   URINALYSIS     EKG Readings: (Independently Interpreted)   Initial Reading: No STEMI. Rhythm: Normal Sinus Rhythm. Heart Rate: 65. Ectopy: No Ectopy. Conduction: Normal. ST Segments: Normal ST Segments. T Waves: Normal. Clinical Impression: Normal Sinus Rhythm          Medical Decision Making:   History:   Old Medical Records: I decided to obtain old medical records.  Initial Assessment:   37-year-old female with complaints consistent with upper abdominal pain with associated nausea, vomiting, chest pain and headache and pregnancy.  Afebrile and neurovascularly intact.  She is alert, healthy and nontoxic appearing.  She is in no apparent distress.  Chest and a gravid uterus.  Abdomen is otherwise soft and nontender.  The pain is mostly to the upper abdomen and epigastric region.  I do believe that this pain is likely secondary to gastritis versus acid reflux.  Patient also reports headache.  No focal neurological deficits. Patient does have documented IUP.  She denies vaginal bleeding or discharge.  Independently Interpreted Test(s):   I have ordered and independently interpreted EKG Reading(s) - see prior notes  Clinical Tests:   Lab Tests: Ordered and Reviewed  Medical Tests: Ordered and Reviewed  ED Management:  Patient did tell triage nurse that she was experiencing some chest pain.  EKG was obtained and independently interpreted by myself and consistent with normal sinus rhythm with no evidence  of acute ischemia or STEMI. UPT and urinalysis obtained.  Urinalysis is no evidence of serious bacterial infection, UTI pyelonephritis.  Fetal heart tones within normal limits.  She was administered Reglan, Pepcid and Tylenol.  Patient did develop one of the Tylenol.  She does state that her nausea is improved as well as some of her abdominal pain.  I do believe that her symptoms of pain are likely secondary to gastritis.  She is urged bland diet.  Will prescribe Reglan and Pepcid for home.  Patient was given a second dose of 500 mg Tylenol that she did have an episode of emesis from the first dose.  She is urged Tylenol at home for headaches as directed on packaging.  She is follow-up with OB/GYN in the next 48 hours or return for any worsening signs or symptoms.  This patient was discussed with the attending physician who agrees with treatment plan.  Other:   I have discussed this case with another health care provider.       <> Summary of the Discussion: Guille  This note was created using Dragon Medical dictation.  There may be typographical errors secondary to dictation.                     ED Course      Clinical Impression:     1. Acute nonintractable headache, unspecified headache type    2. Chest pain    3. Non-intractable vomiting with nausea, unspecified vomiting type    4. Abdominal pain in pregnancy, second trimester        Disposition:   Disposition: Discharged  Condition: Stable                        Kinsey Wagner PA-C  09/06/17 2037

## 2017-09-07 NOTE — DISCHARGE INSTRUCTIONS
Can take Tylenol as directed on packaging for your headache.  Take medications as prescribed for nausea, vomiting and pain.  Follow-up with your OB/GYN.

## 2017-09-12 ENCOUNTER — TELEPHONE (OUTPATIENT)
Dept: OBSTETRICS AND GYNECOLOGY | Facility: CLINIC | Age: 37
End: 2017-09-12

## 2017-09-12 NOTE — TELEPHONE ENCOUNTER
Called pt back and she informed me that she has been having cramps in lower abd. Pt states that she thinks these cramps are contractions. I asked pt when the last time was that she had a bowel movement. Pt can not remember said that it has been days. I informed pt to take the COLACE that she was prescribed and call be back later to let me know how she is feeling once she has a movement. Pt verbalized understanding.

## 2017-09-12 NOTE — TELEPHONE ENCOUNTER
----- Message from George Palafox sent at 9/12/2017  8:13 AM CDT -----  15 wks ob pt having a lot of pain [phone]

## 2017-09-12 NOTE — TELEPHONE ENCOUNTER
----- Message from Brennon Mendez sent at 9/12/2017 10:49 AM CDT -----  X_  1st Request  _  2nd Request  _  3rd Request        Who: NATHAN HUMPHREY [[id number]]    Why: Pt returning a call from Barbara. Please call back.    What Number to Call Back:[phone]    When to Expect a call back: (With in 24 hours)

## 2017-09-20 ENCOUNTER — ROUTINE PRENATAL (OUTPATIENT)
Dept: OBSTETRICS AND GYNECOLOGY | Facility: CLINIC | Age: 37
End: 2017-09-20
Payer: MEDICAID

## 2017-09-20 ENCOUNTER — PATIENT MESSAGE (OUTPATIENT)
Dept: OBSTETRICS AND GYNECOLOGY | Facility: CLINIC | Age: 37
End: 2017-09-20

## 2017-09-20 VITALS — BODY MASS INDEX: 31.2 KG/M2 | SYSTOLIC BLOOD PRESSURE: 130 MMHG | WEIGHT: 165.13 LBS | DIASTOLIC BLOOD PRESSURE: 80 MMHG

## 2017-09-20 DIAGNOSIS — G44.219 EPISODIC TENSION-TYPE HEADACHE, NOT INTRACTABLE: ICD-10-CM

## 2017-09-20 DIAGNOSIS — Z34.82 NORMAL PREGNANCY IN MULTIGRAVIDA IN SECOND TRIMESTER: ICD-10-CM

## 2017-09-20 DIAGNOSIS — Z98.891 H/O CESAREAN SECTION: ICD-10-CM

## 2017-09-20 DIAGNOSIS — O99.810 IMPAIRED GLUCOSE IN PREGNANCY, ANTEPARTUM: ICD-10-CM

## 2017-09-20 DIAGNOSIS — O09.522 ELDERLY MULTIGRAVIDA IN SECOND TRIMESTER: ICD-10-CM

## 2017-09-20 DIAGNOSIS — O10.919 CHRONIC HYPERTENSION AFFECTING PREGNANCY: ICD-10-CM

## 2017-09-20 PROBLEM — G43.009 MIGRAINE WITHOUT AURA AND WITHOUT STATUS MIGRAINOSUS, NOT INTRACTABLE: Status: ACTIVE | Noted: 2017-09-20

## 2017-09-20 PROBLEM — G44.209 TENSION TYPE HEADACHE: Status: ACTIVE | Noted: 2017-09-20

## 2017-09-20 PROCEDURE — 99213 OFFICE O/P EST LOW 20 MIN: CPT | Mod: TH,S$PBB,, | Performed by: OBSTETRICS & GYNECOLOGY

## 2017-09-20 PROCEDURE — 3008F BODY MASS INDEX DOCD: CPT | Mod: ,,, | Performed by: OBSTETRICS & GYNECOLOGY

## 2017-09-20 PROCEDURE — 99213 OFFICE O/P EST LOW 20 MIN: CPT | Mod: PBBFAC | Performed by: OBSTETRICS & GYNECOLOGY

## 2017-09-20 PROCEDURE — 99999 PR PBB SHADOW E&M-EST. PATIENT-LVL III: CPT | Mod: PBBFAC,,, | Performed by: OBSTETRICS & GYNECOLOGY

## 2017-09-20 RX ORDER — BUTALBITAL, ACETAMINOPHEN AND CAFFEINE 50; 325; 40 MG/1; MG/1; MG/1
1 TABLET ORAL EVERY 4 HOURS PRN
Qty: 30 TABLET | Refills: 0 | Status: SHIPPED | OUTPATIENT
Start: 2017-09-20 | End: 2017-10-20

## 2017-09-20 NOTE — PROGRESS NOTES
Slight fetal movement.  No cramping, no vaginal bleeding, and no loss of fluid.    Constipation: Patient is very constipated.  Reports that she stopped prenatals, iron, etc.  And is now using prune juice and colace.  Bowel movements are now 2x per day.  Most recent bowel movement had blood spots in the toilet.  Was not straining before having blood spots int he toilet.  Blood spots have since resolved.  Recommended daily fiber and given instructions on use.    CHTN: last time, patient told me that she was no longer taking the HCTZ, but she reports that she actually stopped the norvasc.    Headaches: patient reports bilateral, squeezing discomfort.  Occur daily.  Takes tylenol- two regular strength- without relief.  Discussed dosing of tylenol, maximum dose and toxic levels.  Reports what works for her is fioricet and aleve.  Will prescribe fioricet as a prn.    It appears the patient missed a consult with M.  She reports that she was told at her first ultrasound not to go.  Will schedule again with anatomy scan.

## 2017-09-25 ENCOUNTER — TELEPHONE (OUTPATIENT)
Dept: OBSTETRICS AND GYNECOLOGY | Facility: CLINIC | Age: 37
End: 2017-09-25

## 2017-09-25 NOTE — TELEPHONE ENCOUNTER
----- Message from Ramsey Butler sent at 9/25/2017  9:12 AM CDT -----  Contact: Patient   x_  1st Request  _  2nd Request  _  3rd Request        Who: NATHAN HUMPHREY [[id number]]    Why: Requesting a call back in regards to discussing rx for yeast infection.   Please return the call at earliest convenience. Thanks!    What Number to Call Back:[phone] (M)    When to Expect a call back: (Within 24 hours)

## 2017-09-25 NOTE — TELEPHONE ENCOUNTER
Called pt and she states that the MONISTAT is not helping wither her yeast infection. Will inform Dr. Domínguez. Pt verbalized understanding.

## 2017-09-26 NOTE — TELEPHONE ENCOUNTER
Called pt and scheduled appt with GEOFFREY on Thursday for her yeast infection. Pt verbalized understanding.

## 2017-10-03 ENCOUNTER — HOSPITAL ENCOUNTER (EMERGENCY)
Facility: OTHER | Age: 37
Discharge: HOME | End: 2017-10-03
Attending: EMERGENCY MEDICINE
Payer: MEDICAID

## 2017-10-03 VITALS
HEART RATE: 67 BPM | WEIGHT: 163 LBS | RESPIRATION RATE: 16 BRPM | TEMPERATURE: 98 F | BODY MASS INDEX: 30.78 KG/M2 | HEIGHT: 61 IN | SYSTOLIC BLOOD PRESSURE: 120 MMHG | OXYGEN SATURATION: 100 % | DIASTOLIC BLOOD PRESSURE: 58 MMHG

## 2017-10-03 DIAGNOSIS — R51.9 ACUTE NONINTRACTABLE HEADACHE, UNSPECIFIED HEADACHE TYPE: Primary | ICD-10-CM

## 2017-10-03 PROCEDURE — 99283 EMERGENCY DEPT VISIT LOW MDM: CPT

## 2017-10-03 PROCEDURE — 25000003 PHARM REV CODE 250: Performed by: PHYSICIAN ASSISTANT

## 2017-10-03 PROCEDURE — 25000242 PHARM REV CODE 250 ALT 637 W/ HCPCS: Performed by: EMERGENCY MEDICINE

## 2017-10-03 RX ORDER — FLUTICASONE PROPIONATE 50 MCG
1 SPRAY, SUSPENSION (ML) NASAL 2 TIMES DAILY PRN
Qty: 15 G | Refills: 0 | Status: SHIPPED | OUTPATIENT
Start: 2017-10-03 | End: 2018-04-25

## 2017-10-03 RX ORDER — CETIRIZINE HYDROCHLORIDE 10 MG/1
10 TABLET ORAL DAILY
Qty: 30 TABLET | Refills: 0 | Status: SHIPPED | OUTPATIENT
Start: 2017-10-03 | End: 2018-04-25

## 2017-10-03 RX ORDER — METOCLOPRAMIDE 10 MG/1
10 TABLET ORAL
Status: COMPLETED | OUTPATIENT
Start: 2017-10-03 | End: 2017-10-03

## 2017-10-03 RX ORDER — FLUTICASONE PROPIONATE 50 MCG
2 SPRAY, SUSPENSION (ML) NASAL DAILY
Status: DISCONTINUED | OUTPATIENT
Start: 2017-10-04 | End: 2017-10-03

## 2017-10-03 RX ORDER — FLUTICASONE PROPIONATE 50 MCG
2 SPRAY, SUSPENSION (ML) NASAL DAILY
Status: DISCONTINUED | OUTPATIENT
Start: 2017-10-03 | End: 2017-10-04 | Stop reason: HOSPADM

## 2017-10-03 RX ORDER — METOCLOPRAMIDE 10 MG/1
10 TABLET ORAL EVERY 6 HOURS PRN
Qty: 30 TABLET | Refills: 0 | Status: SHIPPED | OUTPATIENT
Start: 2017-10-03 | End: 2017-10-24

## 2017-10-03 RX ORDER — CETIRIZINE HYDROCHLORIDE 10 MG/1
10 TABLET ORAL
Status: COMPLETED | OUTPATIENT
Start: 2017-10-03 | End: 2017-10-03

## 2017-10-03 RX ADMIN — METOCLOPRAMIDE 10 MG: 10 TABLET ORAL at 11:10

## 2017-10-03 RX ADMIN — CETIRIZINE HYDROCHLORIDE 10 MG: 10 TABLET, FILM COATED ORAL at 11:10

## 2017-10-03 RX ADMIN — FLUTICASONE PROPIONATE 2 SPRAY: 50 SPRAY, METERED NASAL at 11:10

## 2017-10-04 NOTE — ED PROVIDER NOTES
Encounter Date: 10/3/2017       History     Chief Complaint   Patient presents with    Headache     Patient is 18 weeks pregnant.  Patient c/o a frontal headache for 2 weeks.  Patient has been taking tylenol and fioricet with no relief.  Patient also reporting nausea, vomiting and dizziness.      37-year-old female who is 18 weeks gestation presents emergency department with complaints of headache.  She states the headache is been present for the last 2 weeks.  She states that she's been treating with Tylenol and Fioricet with no relief.  She reports some associated nausea, vomiting and dizziness.  She denies nasal congestion, cough, fever, chills, trauma, injury, neck pain, stiffness, confusion.  She complains of pain is a 10 out of 10.  She states the pain is located across the forehead.      The history is provided by the patient.     Review of patient's allergies indicates:   Allergen Reactions    Morphine Hives    Pcn [penicillins]      Past Medical History:   Diagnosis Date    Chronic back pain     Elevated cholesterol     Hypertension     Impaired glucose in pregnancy, antepartum     Migraine      Past Surgical History:   Procedure Laterality Date     SECTION, CLASSIC      x3; all at term    LIPOSUCTION W/ FAT INJECTION       Family History   Problem Relation Age of Onset    Breast cancer Neg Hx     Colon cancer Neg Hx     Ovarian cancer Neg Hx      Social History   Substance Use Topics    Smoking status: Never Smoker    Smokeless tobacco: Never Used    Alcohol use No     Review of Systems   Constitutional: Negative for chills and fever.   HENT: Negative for congestion and sore throat.    Respiratory: Negative for cough and shortness of breath.    Cardiovascular: Negative for chest pain.   Gastrointestinal: Positive for nausea and vomiting.   Genitourinary: Negative for difficulty urinating and dysuria.   Musculoskeletal: Negative for back pain, neck pain and neck stiffness.   Skin:  Negative for rash.   Neurological: Positive for dizziness and headaches. Negative for syncope, weakness and light-headedness.   Hematological: Does not bruise/bleed easily.   Psychiatric/Behavioral: Negative for confusion.       Physical Exam     Initial Vitals [10/03/17 2059]   BP Pulse Resp Temp SpO2   139/87 82 14 98.2 °F (36.8 °C) 99 %      MAP       104.33         Physical Exam    Nursing note and vitals reviewed.  Constitutional: Vital signs are normal. She appears well-developed and well-nourished. She is not diaphoretic.  Non-toxic appearance. No distress.   HENT:   Head: Normocephalic and atraumatic.   Right Ear: Tympanic membrane, external ear and ear canal normal.   Left Ear: Tympanic membrane, external ear and ear canal normal.   Nose: Mucosal edema present. Right sinus exhibits frontal sinus tenderness. Left sinus exhibits frontal sinus tenderness.   Mouth/Throat: Uvula is midline, oropharynx is clear and moist and mucous membranes are normal. Mucous membranes are not dry. No trismus in the jaw. Normal dentition. No uvula swelling. No oropharyngeal exudate, posterior oropharyngeal edema, posterior oropharyngeal erythema or tonsillar abscesses.   Dull tympanic membranes.  Patient has ALLERGIC shiners.  Tenderness palpation overlying the frontal sinuses   Eyes: Conjunctivae, EOM and lids are normal. Pupils are equal, round, and reactive to light. Right conjunctiva is not injected. Right conjunctiva has no hemorrhage. Left conjunctiva is not injected. Left conjunctiva has no hemorrhage. No scleral icterus.   Neck: Normal range of motion and phonation normal. Neck supple. No spinous process tenderness and no muscular tenderness present. Normal range of motion present. No neck rigidity.   Cardiovascular: Normal rate, regular rhythm and normal heart sounds. Exam reveals no gallop and no friction rub.    No murmur heard.  Pulmonary/Chest: Effort normal and breath sounds normal. No respiratory distress. She has  no decreased breath sounds. She has no wheezes. She has no rhonchi. She has no rales.   Abdominal: Normal appearance. She exhibits distension (garvid uterus).   Musculoskeletal: Normal range of motion.   No obvious deformities, moving all extremities, normal gait   Neurological: She is alert and oriented to person, place, and time. She has normal strength and normal reflexes. No sensory deficit.   Skin: Skin is warm, dry and intact. No lesion and no rash noted. No erythema.   Psychiatric: She has a normal mood and affect. Her speech is normal and behavior is normal. Judgment normal. Cognition and memory are normal.         ED Course   Procedures  Labs Reviewed - No data to display          Medical Decision Making:   History:   Old Medical Records: I decided to obtain old medical records.  Initial Assessment:   37-year-old female with complaints consistent with headache likely secondary to sinus headache.  Vital signs stable, afebrile, neurovascularly intact.  She is alert, healthy and nontoxic appearing.  She is in no apparent distress.  No focal neurological deficits.  No signs of trauma or injury.  Patient does have swollen nasal turbinates with all tympanic membranes and ALLERGIC shiners with tenderness palpation over the frontal sinuses.  I do believe that patient's pain is secondary to sinus headache.  No signs of serious bacterial infection.  ED Management:  She was administered Flonase, Reglan and Zyrtec in the emergency department.  She is stable will be discharged home with prescriptions for these medications.  She is urged to continue Tylenol or Fioricet as prescribed and return for any worsening signs or symptoms, otherwise follow-up with her OB/GYN.  She states understanding.  This patient was discussed with the attending physician who agrees with treatment plan.  Other:   I have discussed this case with another health care provider.       <> Summary of the Discussion: janiya  This note was created using  [employer] dictation.  There may be typographical errors secondary to dictation.                     ED Course      Clinical Impression:     1. Acute nonintractable headache, unspecified headache type        Disposition:   Disposition: Discharged  Condition: Stable                        Kinsey Wagner PA-C  10/03/17 2002

## 2017-10-04 NOTE — ED TRIAGE NOTES
Pt to ED with headaches for past 2 weeks.  States pain is throbbing, rated 10/10, constant pain.

## 2017-10-12 ENCOUNTER — RESEARCH ENCOUNTER (OUTPATIENT)
Dept: RESEARCH | Facility: HOSPITAL | Age: 37
End: 2017-10-12

## 2017-10-12 ENCOUNTER — OFFICE VISIT (OUTPATIENT)
Dept: MATERNAL FETAL MEDICINE | Facility: CLINIC | Age: 37
End: 2017-10-12
Attending: OBSTETRICS & GYNECOLOGY
Payer: MEDICAID

## 2017-10-12 VITALS — SYSTOLIC BLOOD PRESSURE: 102 MMHG | WEIGHT: 170 LBS | BODY MASS INDEX: 32.12 KG/M2 | DIASTOLIC BLOOD PRESSURE: 64 MMHG

## 2017-10-12 DIAGNOSIS — O99.891 CURRENT MATERNAL CONDITION AFFECTING PREGNANCY: ICD-10-CM

## 2017-10-12 DIAGNOSIS — D25.9 UTERINE FIBROIDS AFFECTING PREGNANCY IN SECOND TRIMESTER: ICD-10-CM

## 2017-10-12 DIAGNOSIS — Z34.82 NORMAL PREGNANCY IN MULTIGRAVIDA IN SECOND TRIMESTER: ICD-10-CM

## 2017-10-12 DIAGNOSIS — O34.12 UTERINE FIBROIDS AFFECTING PREGNANCY IN SECOND TRIMESTER: ICD-10-CM

## 2017-10-12 DIAGNOSIS — Z36.4 ANTENATAL SCREENING FOR FETAL GROWTH RETARDATION USING ULTRASONICS: ICD-10-CM

## 2017-10-12 DIAGNOSIS — O10.919 CHRONIC HYPERTENSION AFFECTING PREGNANCY: ICD-10-CM

## 2017-10-12 DIAGNOSIS — O34.219 PREGNANCY WITH HISTORY OF CESAREAN SECTION, ANTEPARTUM: ICD-10-CM

## 2017-10-12 DIAGNOSIS — O09.522 ELDERLY MULTIGRAVIDA IN SECOND TRIMESTER: Primary | ICD-10-CM

## 2017-10-12 DIAGNOSIS — G44.219 EPISODIC TENSION-TYPE HEADACHE, NOT INTRACTABLE: ICD-10-CM

## 2017-10-12 PROCEDURE — 99203 OFFICE O/P NEW LOW 30 MIN: CPT | Mod: TH,S$PBB,, | Performed by: OBSTETRICS & GYNECOLOGY

## 2017-10-12 PROCEDURE — 99999 PR PBB SHADOW E&M-EST. PATIENT-LVL III: CPT | Mod: PBBFAC,,,

## 2017-10-12 PROCEDURE — 99213 OFFICE O/P EST LOW 20 MIN: CPT | Mod: PBBFAC

## 2017-10-12 PROCEDURE — 76815 OB US LIMITED FETUS(S): CPT | Mod: 26,S$PBB,, | Performed by: OBSTETRICS & GYNECOLOGY

## 2017-10-12 PROCEDURE — 76815 OB US LIMITED FETUS(S): CPT | Mod: PBBFAC | Performed by: OBSTETRICS & GYNECOLOGY

## 2017-10-12 NOTE — LETTER
October 15, 2017      Iliana Domínguez MD  [address] [facility] [address]           [facility]  [address]  [facility] [address]  Phone: [phone]          Patient: Emily Mckeon   MR Number: [id number]   YOB: 1980   Date of Visit: 10/12/2017       Dear Dr. Iliana Domínguez:    Thank you for referring Emily Mckeon to me for evaluation. Attached you will find relevant portions of my assessment and plan of care.    If you have questions, please do not hesitate to call me. I look forward to following Emily Mckeon along with you.    Sincerely,    Earline Canseco MD    Enclosure  CC:  No Recipients    If you would like to receive this communication electronically, please contact [email or web address] or [phone] to request more information on [employer] Link access.    For providers and/or their staff who would like to refer a patient to [facility], please contact us through our one-stop-shop provider referral line, [facility] , at [phone].    If you feel you have received this communication in error or would no longer like to receive these types of communications, please e-mail [email or web address]

## 2017-10-12 NOTE — PROGRESS NOTES
2014.28.C DIANA MISTRY met with Ms. Mckeon in [facility] clinic. She is diagnosed with hypertension and currently on HCTZ. We discussed the study in detail, including purpose, numbers/length, procedures, risk/benefit, cost/payment, contacts (investigators/ IRB), alternatives/voluntary nature/withdrawal, confidentiality/HIPAA. She had no questions. Dr. Canseco was available.      She had time to review the consent document before she signed. She willingly signed the consent. . She consented to the optional blood draws. Blood was drawn today at 10:21.     Pragmatic BP was taken in clinic. Pulse 100. Omron measurements taken per protocol (110/75 pulse 100). Urine dipstick was negative.     She was randomized to no treatment [id number]. She was given ClinCard. Her next visit is 10/20/17.

## 2017-10-15 NOTE — PROGRESS NOTES
Indication  ========    Confirm new RICHARD.    History  ======    General History  Other: AMA  CHTN  Previous Outcomes  Preg. no. 1  Outcome: Live YOB: 2001  Gest. age 40 w + 0 d  Gender: male  Details:  6lbs  Preg. no. 2  Outcome: Live YOB: 2002  Gest. age 39 w + 0 d  Gender: female  Details:  6lbs  Preg. no. 3  Outcome: Live YOB: 2005  Gest. age 39 w + 0 d  Gender: male  Details:  6lbs   4  Para 3  Mann children born living (T) 3  Mann children born (T) 3  Mann living children (L) 3    Method  ======    Transabdominal ultrasound examination. View: Suboptimal view: limited by early gestational age.    Pregnancy  =========    Mann pregnancy. Number of fetuses: 1.    Dating  ======    Cycle: regular cycle  Ultrasound examination on: 10/12/2017  GA by U/S based upon: AC, BPD, Femur, HC  GA by U/S 14 w + 6 d  RICHARD by U/S: 2018  Assigned: Dating performed on 2017, based on ultrasound (CRL)  Assigned GA 15 w + 1 d  Assigned RICHARD: 2018    General Evaluation  ==============    Cardiac activity: present.  bpm.  Fetal movements: visualized.  Presentation: Unstable lie.  Placenta: anterior.  Umbilical cord: 3 vessel cord, normal.  Amniotic fluid: normal amount.    Fetal Biometry  ============    Fetal Biometry  BPD 27.0 mm 14w 5d Hadlock  OFD 39.1 mm 15w 5d Jessenia  .8 mm 15w 1d Hadlock  AC 86.5 mm 14w 6d Hadlock  Femur 14.9 mm 14w 3d Hadlock  Humerus 16.5 mm 14w 5d Jessenia   g  Calculated by: Hadlock (BPD-HC-AC-FL)  EFW (lb) 0 lb  EFW (oz) 4 oz  Cephalic index 0.69  HC / AC 1.25  FL / BPD 0.55  FL / AC 0.17   bpm    Fetal Anatomy  ============    Cranium: normal  Cord insertion: normal  Stomach: normal  Kidneys: normal  Bladder: normal  Arms: normal  Legs: normal    Maternal Structures  ===============    Uterus / Cervix  Uterus: Normal  Fibroids: Fibroids identified  Findings: Anterior. Left lateral  wall  D1 28.5 mm  D2 19.5 mm  D3 32.8 mm  Mean 26.9 mm  Vol 9.552 cm³  Findings: Anterior  D1 37.0 mm  D2 18.0 mm  D3 34.7 mm  Mean 29.9 mm  Vol 12.098 cm³  Findings: Posterior  D1 31.9 mm  D2 27.7 mm  D3 26.1 mm  Mean 28.6 mm  Vol 12.054 cm³  Findings: Posterior  D1 22.4 mm  D2 20.5 mm  D3 21.0 mm  Mean 21.3 mm  Vol 5.036 cm³  Findings: Posterior. Fundal  D1 35.8 mm  D2 33.1 mm  D3 38.2 mm  Mean 35.7 mm  Vol 23.672 cm³  Findings: Posterior. Left lateral wall  D1 21.6 mm  D2 16.2 mm  D3 19.1 mm  Mean 19.0 mm  Vol 3.500 cm³  Findings: Posterior. Left lateral wall  D1 17.5 mm  D2 13.5 mm  D3 18.3 mm  Mean 16.4 mm  Vol 2.267 cm³  Cervix: Normal  Approach: Transabdominal  Cervical length 54.1 mm  Ovaries / Tubes / Adnexa  Rt ovary: Visualized  Rt ovary D1 2.8 cm  Rt ovary D2 2.8 cm  Rt ovary D3 1.9 cm  Rt ovary mean 2.5 cm  Rt ovary vol 7.6 cm³  Lt ovary: Not visualized  Other: Uterus and adnexa normal    Consultation  ==========    referral for: CHTN  referring MD: Dr. Domínguez    37 y.o.  at 15w 1d gestation    CHTN:  -dx'd 7-8 yrs ago  -in past has been on Lisinopril/Metoprolol, then amlodipine/HCTZ at start of this pregnancy  -Amlodipine was dc'd early this pregnancy and now only on HCTZ    other medical conditions:  hx of migraine headaches  chronic back pain    Ob hx:  1. , 40 wks, male, 6 lbs, LTCS  2. , 39 wks, female, 6 lbs, repeat LTCS  3. , 39 wks, male, 6lbs, repeat LTCS    Fibroids    ultrasound performed:    Counseling: AMA  Today I counseled the patient on the relationship between maternal age and aneuploidy. We discussed the risks and benefits of screening  tests versus diagnostic genetic testing. She was counseled about her specific age related risk of Down Syndrome. We discussed the  limitations of ultrasound in the definitive diagnosis of Down Syndrome and we discussed amniocentesis as providing definitive diagnosis. I  quoted the patient a 1 in 900 procedure-related risk of fetal  loss with genetic amniocentesis. I also discussed with the patient the option of  cell-free DNA screening (ex. Materni T21) including the sensitivity and specificity of the test. Her questions were answered and after today's  consultation she declined genetic screening or amniocentesis.    Counseling: HTN  Today I counseled the patient on maternal/fetal risks associated with CHTN during pregnancy including fetal growth restriction, miscarriage,   delivery, development of superimposed preeclampsia, and eclampsia. She was counseled on the recommendations for blood pressure  control, serial ultrasound for fetal growth assessment and  testing, and timing of delivery. I also counseled her on the recommendation  for aspirin 81 mg daily which may decrease her risk of developing superimposed preeclampsia.    Patient counseled on the CHAP trial including hypothesis, randomization groups, antihypertensive medications used, and follow up of  maternal/ outcomes. Patient's questions were answered. I asked if she was interested and would she mind if one of our clinical  research coordinators talked with her in further detail about the study and logistics. She reported she was interested and would like to talk with  CRC. After CRC discussion, patient desires participation in CHAP trial.    Counseling: Fibroids , The patient was informed of the results of the ultrasound today. The patient was counseled about fibroids in pregnancy.  The patient was counseled that fibroids may remain stable or increase in size. The patient was counseled that some fibroids may undergo  degeneration and cause severe abdominal pain and require hospitalization for pain control. The patient was counseled that large fibroids may  be associated with obstruction of labor or fetal malpresentation. The patient was counseled that retroplacental fibroids may be associated with  fetal growth restriction (FGR). I recommend that the patient  have serial fetal growth ultrasounds every 4 weeks, starting at approximately 28-32  weeks. I have scheduled the patient for a repeat fetal growth ultrasound in 4 weeks.    With patient's HTN and fibroids, Serial sonos will be performed throughout the pregnancy especially in the third trimester.    I overall spent approximately 35 minutes in face to face time with the patient and her family, greater than 50% of which was in counseling and  care coordination.    Impression  =========  1. 15w 1d leonardo intrauterine pregnancy  2. Fetal biometry consistent with dates  3. Limited fetal evaluation: no obvious abnormalities appreciated at this early EGA  4. Amniotic fluid volume wnl per qualitative assessment .    Recommendation  ==============    1. CHTN recommendations: PATIENT IS PARTICIPATING IN CHAP TRIAL  Recommendations from our MFM group at [facility]:  - Because of the increased risk of preeclampsia in patients with underlying chronic hypertension, we recommend starting aspirin 81mg daily  beginning at 12-13 weeks.  -Patient counseled today    -Because of the increased risk of preeclampsia in patients with chronic hypertension, we recommend obtaining a baseline 24-hour urine protein  evaluation or a baseline urine protein to creatinine ratio. We also recommend obtaining baseline labs including a CBC, electrolytes, and AST.    -Secondary to the higher incidence of intrauterine growth restriction (IUGR) in patients with chronic hypertension, we recommend periodic  (every 4-6 weeks) fetal growth assessments beginning at 24-26 weeks.  -recommend a targeted fetal anatomical survey at 19-20 wks gestation with MFM: please  make sure patient has an appt    -Continued close observation of patient's blood pressures. Care should be taken also to avoid hypotension in pregnancy as this can be  associated with uteroplacental insufficiency. Currently we recommend:  -For women with a systolic BP < 160mmHg or a diastolic BP <  105mmHg and no evidence of end organ damage, no medication treatment  needed  -For women with a systolic BP persistently =160mmHg or a diastolic BP = 105mmHg, antihypertensive medication is recommended with goal  to be between 120-160mmHg systolic and 80-105mmHg diastolic.    -Initiation of  fetal surveillance (ex. NST twice a week, weekly amniotic fluid volume assessment iwht MVP)    -In regards to timing of delivery we recommend:  -Women controlled or on one agent: 39 0/7 - 39 6/7 weeks EGA  -Women that are uncontrolled or on 2 mor more agents: 37 0/7 - 37 6/7 weeks    -Ophthalmologic evaluation for baseline assessment and subsequent appointments per Ophthalmology recommendations is recommended for  women who have had hypertension for 10 or more years.    -For patients with hypertension for greater than 10 years duration, a baseline EKG is recommended. If there are any findings concerning for left  ventricular hypertrophy, an echocardiogram should be obtained.    -Due to the association between obesity, hypertension and insulin resistance, early screening for undiagnosed pre-existing diabetes in the first  or early second trimester should be considered. If normal, it should be repeated at 24-28 weeks to rule out gestational diabetes mellitus.    2. AMA:  Recommendations from our MFM group at [facility]:  -For women who are of advanced maternal age at the time of delivery we recommend a follow up fetal ultrasound at 32-34 weeks for fetal growth  assessment.  -We recommend initiation of low dose asprin 81 mg daily following the 1st trimester to reduce the risk of pre-eclampsia, IUGR and stillbirth.  -In addition, because of the association of increased stillbirth noted in women over the age of 40 at time of delivery, we recommend weekly fetal  surveillance (ex. NST/MVP) starting at 32 weeks until delivery in women 40 and over and the time of their RICHARD.  -In this population of women over the age of 40 at time  of delivery, we do not recommend to allow the pregnancy to proceed beyond 41 weeks  gestation.    3. Fibroids: serial sonos for fetal growth in third trimester about every 4wks    As pregnancy progresses, we will make more detailed MFM recommendations based on the patients clinical status. Please do not hesitate to  contact us if you have any questions or need assistance with management.

## 2017-10-20 ENCOUNTER — TELEPHONE (OUTPATIENT)
Dept: RESEARCH | Facility: HOSPITAL | Age: 37
End: 2017-10-20

## 2017-10-24 ENCOUNTER — RESEARCH ENCOUNTER (OUTPATIENT)
Dept: RESEARCH | Facility: HOSPITAL | Age: 37
End: 2017-10-24

## 2017-10-24 ENCOUNTER — ROUTINE PRENATAL (OUTPATIENT)
Dept: OBSTETRICS AND GYNECOLOGY | Facility: CLINIC | Age: 37
End: 2017-10-24
Payer: MEDICAID

## 2017-10-24 ENCOUNTER — TELEPHONE (OUTPATIENT)
Dept: RESEARCH | Facility: HOSPITAL | Age: 37
End: 2017-10-24

## 2017-10-24 ENCOUNTER — TELEPHONE (OUTPATIENT)
Dept: OBSTETRICS AND GYNECOLOGY | Facility: CLINIC | Age: 37
End: 2017-10-24

## 2017-10-24 VITALS
WEIGHT: 167.56 LBS | DIASTOLIC BLOOD PRESSURE: 66 MMHG | SYSTOLIC BLOOD PRESSURE: 116 MMHG | BODY MASS INDEX: 31.66 KG/M2

## 2017-10-24 DIAGNOSIS — O10.919 CHRONIC HYPERTENSION AFFECTING PREGNANCY: ICD-10-CM

## 2017-10-24 DIAGNOSIS — Z3A.17 17 WEEKS GESTATION OF PREGNANCY: ICD-10-CM

## 2017-10-24 DIAGNOSIS — R07.89 CHEST WALL PAIN: ICD-10-CM

## 2017-10-24 DIAGNOSIS — Z98.891 H/O CESAREAN SECTION: ICD-10-CM

## 2017-10-24 DIAGNOSIS — Z34.80 NORMAL PREGNANCY IN MULTIGRAVIDA: Primary | ICD-10-CM

## 2017-10-24 PROBLEM — O09.529 ELDERLY MULTIGRAVIDA: Status: ACTIVE | Noted: 2017-09-20

## 2017-10-24 PROCEDURE — 99212 OFFICE O/P EST SF 10 MIN: CPT | Mod: PBBFAC | Performed by: NURSE PRACTITIONER

## 2017-10-24 PROCEDURE — 99999 PR PBB SHADOW E&M-EST. PATIENT-LVL II: CPT | Mod: PBBFAC,,, | Performed by: NURSE PRACTITIONER

## 2017-10-24 PROCEDURE — 99213 OFFICE O/P EST LOW 20 MIN: CPT | Mod: TH,S$PBB,, | Performed by: NURSE PRACTITIONER

## 2017-10-24 NOTE — PROGRESS NOTES
CHAP 2014.288.C routine study visit    I met with Ms. Mckeon in the Ob clinic this morning. Her weight, BP and urine dipstick had been measured clinically. Today the BP was 116/66; her pulse measured 80. She reported pain on right side rib cage and sob, which had been evaluated clinically. She reported that she went to [facility] ED last Wednesday because she thought she was having contractions. BPs were elevated. She refused to go to [facility] ED. We discussed obtaining her records- I reminded her that she had signed an authorization for release of information and we can use it. She claimed to have her discharge summary downstairs and agreed to bring it to me to copy. She reported no changes in medication- She is in the routine group.     In a later telephone call, she reported that she did not have the papers.

## 2017-10-24 NOTE — PROGRESS NOTES
"States went to [facility] ED over the wknd with pelvic cramping, no bleeding and "they gave her IV fluids and wanted to transfer her to [facility] ED, but she went home instead, rested and the cramping stopped";  Reports + FM;   Today, woke up with sharp pain right ribcage worse with position change; Pain doesn't radiate, denies sweating, cough, sob, dyspnea, heartburn, n/v, back ache; Exam: pain reproduced with palpitation of right anterior ribcage under her bra, Lungs with good breath sounds in all lung fields, Heart w nl S1 and S2, w/o murmur; pulse 88 reg; WE given for today to go home, stand under warm shower, take Tylenol and rest with pillows for support; Go to the ED if symptoms worsen; F/U as scheduled for US and visit with Dr Domínguez.   "

## 2017-10-24 NOTE — TELEPHONE ENCOUNTER
She is heading to 5th floor with NO [facility] ED information; I will meet her in the waiting room

## 2017-10-27 ENCOUNTER — TELEPHONE (OUTPATIENT)
Dept: OBSTETRICS AND GYNECOLOGY | Facility: CLINIC | Age: 37
End: 2017-10-27

## 2017-10-27 NOTE — TELEPHONE ENCOUNTER
Pt called to inform me that she faxed over her [facility] paperwork to us. Pt verbalized understanding.

## 2017-10-27 NOTE — TELEPHONE ENCOUNTER
----- Message from Jackie Espinosa sent at 10/27/2017  9:06 AM CDT -----  Contact: self  Pt called, call transferred to Barbara.

## 2017-11-06 ENCOUNTER — TELEPHONE (OUTPATIENT)
Dept: OBSTETRICS AND GYNECOLOGY | Facility: CLINIC | Age: 37
End: 2017-11-06

## 2017-11-06 NOTE — TELEPHONE ENCOUNTER
Called pt back and she informed me that she needs her [facility] paperwork finished by 11/10. Pt verbalized understanding.

## 2017-11-06 NOTE — TELEPHONE ENCOUNTER
----- Message from George Palafox sent at 11/6/2017  8:26 AM CST -----  Please call pt regarding her [facility] papers [phone]

## 2017-11-07 ENCOUNTER — TELEPHONE (OUTPATIENT)
Dept: OBSTETRICS AND GYNECOLOGY | Facility: CLINIC | Age: 37
End: 2017-11-07

## 2017-11-07 NOTE — TELEPHONE ENCOUNTER
----- Message from Monica Brice sent at 11/7/2017  9:34 AM CST -----  Contact: NATHAN HUMPHREY [[id number]]  x  1st Request  _  2nd Request  _  3rd Request        Who: NATHAN HUMPHREY [[id number]]    Why: Patient states she would like a call back in regards to her  [place] paperwork     What Number to Call Back: [phone]    When to Expect a call back: (Before the end of the day)   -- if call after 3:00 call back will be tomorrow.

## 2017-11-08 ENCOUNTER — OFFICE VISIT (OUTPATIENT)
Dept: MATERNAL FETAL MEDICINE | Facility: CLINIC | Age: 37
End: 2017-11-08
Payer: MEDICAID

## 2017-11-08 ENCOUNTER — RESEARCH ENCOUNTER (OUTPATIENT)
Dept: RESEARCH | Facility: HOSPITAL | Age: 37
End: 2017-11-08

## 2017-11-08 DIAGNOSIS — Z36.89 ENCOUNTER FOR FETAL ANATOMIC SURVEY: ICD-10-CM

## 2017-11-08 DIAGNOSIS — O09.522 ELDERLY MULTIGRAVIDA IN SECOND TRIMESTER: ICD-10-CM

## 2017-11-08 PROCEDURE — 76811 OB US DETAILED SNGL FETUS: CPT | Mod: PBBFAC | Performed by: OBSTETRICS & GYNECOLOGY

## 2017-11-08 PROCEDURE — 76811 OB US DETAILED SNGL FETUS: CPT | Mod: 26,S$PBB,, | Performed by: OBSTETRICS & GYNECOLOGY

## 2017-11-08 PROCEDURE — 99499 UNLISTED E&M SERVICE: CPT | Mod: S$PBB,,, | Performed by: OBSTETRICS & GYNECOLOGY

## 2017-11-08 NOTE — PROGRESS NOTES
2014.288.C [facility] study visit    I met with Ms. Mckeon in the [facility] clinic after her scan. BP using the Omron was 124/85 and pulse was 101. She weighted 76.4 kg. She reports being lightheaded occasionally.She denied any new antihypertensive medications, but is taking a multivitamin (not prenatal). She thinks she is low on iron; she was told this when she wanted to donate blood. I suggested she discuss this with her Ob. She denied any unplanned visits. We discussed upcoming visits and she verbalized understanding.

## 2017-11-08 NOTE — LETTER
November 9, 2017      Iliana Domínguez MD  [address] [facility] [address]           [facility] - Maternal Fetal Med  [address]  [facility] [address]  Phone: [phone]          Patient: Emily Mckeon   MR Number: [id number]   YOB: 1980   Date of Visit: 11/8/2017       Dear Dr. Iliana Domínguez:    Thank you for referring Emily Mckeon to me for evaluation. Attached you will find relevant portions of my assessment and plan of care.    If you have questions, please do not hesitate to call me. I look forward to following Emily Mckeon along with you.    Sincerely,    Talia Keller MD    Enclosure  CC:  No Recipients    If you would like to receive this communication electronically, please contact [email or web address] or [phone] to request more information on [employer] Link access.    For providers and/or their staff who would like to refer a patient to [facility], please contact us through our one-stop-shop provider referral line, [facility], at [phone].    If you feel you have received this communication in error or would no longer like to receive these types of communications, please e-mail [email or web address]

## 2017-11-09 NOTE — PROGRESS NOTES
Indication  ========    Fetal anatomy survey.    History  ======    General History  Other: AMA  CHTN  Previous Outcomes  Preg. no. 1  Outcome: Live YOB: 2001  Gest. age 40 w + 0 d  Gender: male  Details:  6lbs  Preg. no. 2  Outcome: Live YOB: 2002  Gest. age 39 w + 0 d  Gender: female  Details:  6lbs  Preg. no. 3  Outcome: Live YOB: 2005  Gest. age 39 w + 0 d  Gender: male  Details:  6lbs   4  Para 3  Mann children born living (T) 3  Mann children born (T) 3  Mann living children (L) 3    Method  ======    Transabdominal ultrasound examination.    Pregnancy  =========    Mann pregnancy. Number of fetuses: 1.    Dating  ======    Cycle: regular cycle  Ultrasound examination on: 2017  GA by U/S based upon: AC, BPD, Femur, HC  GA by U/S 18 w + 4 d  RICHARD by U/S: 2018  Assigned: Dating performed on 2017, based on ultrasound (CRL)  Assigned GA 19 w + 0 d  Assigned RICHARD: 2018    General Evaluation  ==============    Cardiac activity: present.  bpm.  Fetal movements: visualized.  Presentation: Unstable lie.  Placenta: anterior.  Umbilical cord: 3 vessel cord, normal.  Amniotic fluid: normal amount.    Fetal Biometry  ============    Fetal Biometry  BPD 39.5 mm 18w 0d Hadlock  OFD 55.9 mm 19w 5d Jessenia  .5 mm 18w 4d Hadlock  .6 mm 19w 5d Hadlock  Femur 27.2 mm 18w 2d Hadlock  Cerebellum tr 18.1 mm 18w 3d Wynne  CM 4.9 mm  Nuchal fold 3.00 mm  Humerus 28.9 mm 19w 3d Jessenia   g  Calculated by: Hadlock (BPD-HC-AC-FL)  EFW (lb) 0 lb  EFW (oz) 9 oz  Cephalic index 0.71  HC / AC 1.09  FL / BPD 0.69  FL / AC 0.19   bpm  Head / Face / Neck   7.6 mm  Nasal bone 7.1 mm    Fetal Anatomy  ===========    Cranium: normal  Lateral ventricles: normal  Choroid plexus: normal  Midline falx: normal  Cavum septi pellucidi: normal  Cerebellum: normal  Cisterna magna: normal  Head shape: normal  Rt  lateral ventricle: normal  Lt lateral ventricle: normal  Rt choroid plexus: normal  Lt choroid plexus: normal  Parenchyma: normal  Third ventricle: normal  Posterior fossa: normal  Cerebellar lobes: normal  Vermis: normal  Neck: appears normal  Nuchal fold: normal  Lips: normal  Profile: normal  Nose: normal  Maxilla: normal  Mandible: normal  4-chamber view: normal  RVOT: normal  LVOT: normal  Heart / Thorax: Septal views: normal  Situs: normal  Aortic arch: suboptimal  Ductal arch: suboptimal  SVC: suboptimal  IVC: suboptimal  3-vessel view: normal  3-vessel-trachea view: suboptimal  Cardiac position: normal  Cardiac axis: normal  Cardiac size: normal  Cardiac rhythm: normal  Rt lung: normal  Lt lung: normal  Diaphragm: normal  Cord insertion: normal  Stomach: normal  Kidneys: normal  Bladder: normal  Abdom. wall: appears normal  Abdom. cavity: normal  Rt kidney: normal  Lt kidney: normal  Liver: normal  Bowel: normal  Cervical spine: normal  Thoracic spine: normal  Lumbar spine: normal  Sacral spine: normal  Arms: normal  Legs: normal  Rt arm: normal  Lt arm: normal  Rt hand: present  Lt hand: present  Rt leg: normal  Lt leg: normal  Rt foot: normal  Lt foot: normal  Position of hands: normal  Position of feet: normal  Wants to know gender: no    Maternal Structures  ===============    Uterus / Cervix  Uterus: Normal  Fibroids: Fibroids identified  Findings: Anterior  D1 42.9 mm  D2 38.2 mm  D3 21.9 mm  Mean 34.3 mm  Vol 18.741 cm³  Findings: Anterior  D1 26.0 mm  D2 47.6 mm  D3 14.5 mm  Mean 29.4 mm  Vol 9.365 cm³  Findings: Anterior  D1 25.0 mm  D2 24.9 mm  D3 16.3 mm  Mean 22.1 mm  Vol 5.318 cm³  Findings: Posterior  D1 25.7 mm  D2 20.1 mm  D3 16.3 mm  Mean 20.7 mm  Vol 4.420 cm³  Findings: Posterior  D1 20.5 mm  D2 16.8 mm  D3 13.8 mm  Mean 17.0 mm  Vol 2.482 cm³  Findings: Posterior  D1 35.2 mm  D2 37.7 mm  D3 27.6 mm  Mean 33.5 mm  Vol 19.231 cm³  Findings: Posterior  D1 49.7 mm  D2 38.3 mm  D3 38.8  mm  Mean 42.3 mm  Vol 38.702 cm³  Cervical length 55.5 mm  Ovaries / Tubes / Adnexa  Rt ovary: Visualized  Lt ovary: Not visualized  Other: Uterus and adnexa normal    Impression  =========    A detailed fetal anatomic ultrasound examination was performed for the following high risk indication: advanced maternal age.  No fetal structural malformations are identified; however, fetal imaging is incomplete today.  A follow-up study will be scheduled to complete the fetal anatomic survey.  Fetal size today is consistent with established gestational age.  Multiple uterine fibroids noted-stable in size.  Cervical length is normal.  Placental location is normal without evidence of previa.    Recommendation  ==============    Recommend repeat ultrasound in 4 weeks to complete anatomy survey.  Recommendations for serial growth ultrasounds, prenatal testing, and timing of delivery as per initial consult.

## 2017-11-27 ENCOUNTER — PATIENT MESSAGE (OUTPATIENT)
Dept: OBSTETRICS AND GYNECOLOGY | Facility: CLINIC | Age: 37
End: 2017-11-27

## 2017-11-27 ENCOUNTER — HOSPITAL ENCOUNTER (INPATIENT)
Facility: OTHER | Age: 37
LOS: 2 days | Discharge: HOME | End: 2017-12-01
Attending: OBSTETRICS & GYNECOLOGY | Admitting: OBSTETRICS & GYNECOLOGY
Payer: MEDICAID

## 2017-11-27 ENCOUNTER — RESEARCH ENCOUNTER (OUTPATIENT)
Dept: RESEARCH | Facility: HOSPITAL | Age: 37
End: 2017-11-27

## 2017-11-27 ENCOUNTER — TELEPHONE (OUTPATIENT)
Dept: OBSTETRICS AND GYNECOLOGY | Facility: CLINIC | Age: 37
End: 2017-11-27

## 2017-11-27 ENCOUNTER — ROUTINE PRENATAL (OUTPATIENT)
Dept: OBSTETRICS AND GYNECOLOGY | Facility: CLINIC | Age: 37
End: 2017-11-27
Payer: MEDICAID

## 2017-11-27 ENCOUNTER — HOSPITAL ENCOUNTER (OUTPATIENT)
Dept: CARDIOLOGY | Facility: CLINIC | Age: 37
Discharge: HOME | End: 2017-11-27
Payer: MEDICAID

## 2017-11-27 VITALS — SYSTOLIC BLOOD PRESSURE: 150 MMHG | BODY MASS INDEX: 32.7 KG/M2 | WEIGHT: 173.06 LBS | DIASTOLIC BLOOD PRESSURE: 90 MMHG

## 2017-11-27 DIAGNOSIS — O10.919 CHRONIC HYPERTENSION AFFECTING PREGNANCY: ICD-10-CM

## 2017-11-27 DIAGNOSIS — O09.92 HIGH-RISK PREGNANCY IN SECOND TRIMESTER: Primary | ICD-10-CM

## 2017-11-27 DIAGNOSIS — D50.8 IRON DEFICIENCY ANEMIA SECONDARY TO INADEQUATE DIETARY IRON INTAKE: ICD-10-CM

## 2017-11-27 DIAGNOSIS — Z3A.21 21 WEEKS GESTATION OF PREGNANCY: ICD-10-CM

## 2017-11-27 DIAGNOSIS — R51.9 PREGNANCY HEADACHE IN SECOND TRIMESTER: Primary | ICD-10-CM

## 2017-11-27 DIAGNOSIS — O26.892 PREGNANCY HEADACHE IN SECOND TRIMESTER: Primary | ICD-10-CM

## 2017-11-27 DIAGNOSIS — R42 DIZZINESS: ICD-10-CM

## 2017-11-27 DIAGNOSIS — R00.2 PALPITATIONS: ICD-10-CM

## 2017-11-27 DIAGNOSIS — H53.8 BLURRY VISION: ICD-10-CM

## 2017-11-27 PROBLEM — O26.899 HEADACHE IN PREGNANCY: Status: ACTIVE | Noted: 2017-11-27

## 2017-11-27 LAB
BILIRUB SERPL-MCNC: NORMAL MG/DL
BLOOD URINE, POC: NORMAL
COLOR, POC UA: NORMAL
CREAT UR-MCNC: 191 MG/DL
CREAT UR-MCNC: 240 MG/DL
GLUCOSE UR QL STRIP: NORMAL
KETONES UR QL STRIP: NORMAL
LEUKOCYTE ESTERASE URINE, POC: NORMAL
NITRITE, POC UA: NORMAL
PH, POC UA: NORMAL
PROT UR-MCNC: 20 MG/DL
PROT UR-MCNC: 25 MG/DL
PROT/CREAT RATIO, UR: 0.08
PROT/CREAT RATIO, UR: 0.13
PROTEIN, POC: NORMAL
SPECIFIC GRAVITY, POC UA: NORMAL
UROBILINOGEN, POC UA: NORMAL

## 2017-11-27 PROCEDURE — 25000003 PHARM REV CODE 250: Performed by: OBSTETRICS & GYNECOLOGY

## 2017-11-27 PROCEDURE — 84156 ASSAY OF PROTEIN URINE: CPT

## 2017-11-27 PROCEDURE — 84156 ASSAY OF PROTEIN URINE: CPT | Mod: 91

## 2017-11-27 PROCEDURE — 99999 PR PBB SHADOW E&M-EST. PATIENT-LVL II: CPT | Mod: PBBFAC,,, | Performed by: OBSTETRICS & GYNECOLOGY

## 2017-11-27 PROCEDURE — 93010 ELECTROCARDIOGRAM REPORT: CPT | Mod: S$PBB,,, | Performed by: INTERNAL MEDICINE

## 2017-11-27 PROCEDURE — 63600175 PHARM REV CODE 636 W HCPCS: Performed by: STUDENT IN AN ORGANIZED HEALTH CARE EDUCATION/TRAINING PROGRAM

## 2017-11-27 PROCEDURE — 96366 THER/PROPH/DIAG IV INF ADDON: CPT

## 2017-11-27 PROCEDURE — 96375 TX/PRO/DX INJ NEW DRUG ADDON: CPT

## 2017-11-27 PROCEDURE — G0378 HOSPITAL OBSERVATION PER HR: HCPCS

## 2017-11-27 PROCEDURE — 93005 ELECTROCARDIOGRAM TRACING: CPT | Mod: PBBFAC | Performed by: INTERNAL MEDICINE

## 2017-11-27 PROCEDURE — 25000003 PHARM REV CODE 250: Performed by: STUDENT IN AN ORGANIZED HEALTH CARE EDUCATION/TRAINING PROGRAM

## 2017-11-27 PROCEDURE — 99285 EMERGENCY DEPT VISIT HI MDM: CPT | Mod: 25,27

## 2017-11-27 PROCEDURE — 96361 HYDRATE IV INFUSION ADD-ON: CPT

## 2017-11-27 PROCEDURE — 81002 URINALYSIS NONAUTO W/O SCOPE: CPT

## 2017-11-27 PROCEDURE — 99213 OFFICE O/P EST LOW 20 MIN: CPT | Mod: TH,S$PBB,, | Performed by: OBSTETRICS & GYNECOLOGY

## 2017-11-27 PROCEDURE — 11000001 HC ACUTE MED/SURG PRIVATE ROOM

## 2017-11-27 PROCEDURE — 99284 EMERGENCY DEPT VISIT MOD MDM: CPT | Mod: ,,, | Performed by: OBSTETRICS & GYNECOLOGY

## 2017-11-27 PROCEDURE — 99212 OFFICE O/P EST SF 10 MIN: CPT | Mod: PBBFAC,25 | Performed by: OBSTETRICS & GYNECOLOGY

## 2017-11-27 PROCEDURE — 96365 THER/PROPH/DIAG IV INF INIT: CPT

## 2017-11-27 RX ORDER — DIPHENHYDRAMINE HYDROCHLORIDE 50 MG/ML
25 INJECTION INTRAMUSCULAR; INTRAVENOUS EVERY 4 HOURS PRN
Status: DISCONTINUED | OUTPATIENT
Start: 2017-11-27 | End: 2017-11-30

## 2017-11-27 RX ORDER — PROCHLORPERAZINE EDISYLATE 5 MG/ML
10 INJECTION INTRAMUSCULAR; INTRAVENOUS ONCE
Status: COMPLETED | OUTPATIENT
Start: 2017-11-27 | End: 2017-11-27

## 2017-11-27 RX ORDER — PRENATAL WITH FERROUS FUM AND FOLIC ACID 3080; 920; 120; 400; 22; 1.84; 3; 20; 10; 1; 12; 200; 27; 25; 2 [IU]/1; [IU]/1; MG/1; [IU]/1; MG/1; MG/1; MG/1; MG/1; MG/1; MG/1; UG/1; MG/1; MG/1; MG/1; MG/1
1 TABLET ORAL DAILY
Status: DISCONTINUED | OUTPATIENT
Start: 2017-11-28 | End: 2017-12-01 | Stop reason: HOSPADM

## 2017-11-27 RX ORDER — LANOLIN ALCOHOL/MO/W.PET/CERES
400 CREAM (GRAM) TOPICAL ONCE
Status: COMPLETED | OUTPATIENT
Start: 2017-11-27 | End: 2017-11-27

## 2017-11-27 RX ORDER — PROMETHAZINE HYDROCHLORIDE 25 MG/1
25 TABLET ORAL EVERY 6 HOURS PRN
Status: DISCONTINUED | OUTPATIENT
Start: 2017-11-27 | End: 2017-11-29

## 2017-11-27 RX ORDER — FERROUS SULFATE 325(65) MG
325 TABLET ORAL DAILY
Qty: 30 TABLET | Refills: 3 | Status: SHIPPED | OUTPATIENT
Start: 2017-11-27 | End: 2018-04-25

## 2017-11-27 RX ORDER — MEPERIDINE HYDROCHLORIDE 50 MG/ML
12.5 INJECTION INTRAMUSCULAR; INTRAVENOUS; SUBCUTANEOUS ONCE
Status: COMPLETED | OUTPATIENT
Start: 2017-11-27 | End: 2017-11-27

## 2017-11-27 RX ORDER — AMOXICILLIN 250 MG
1 CAPSULE ORAL NIGHTLY PRN
Status: DISCONTINUED | OUTPATIENT
Start: 2017-11-27 | End: 2017-12-01 | Stop reason: HOSPADM

## 2017-11-27 RX ORDER — NAPROXEN SODIUM 220 MG/1
81 TABLET, FILM COATED ORAL DAILY
COMMUNITY
End: 2018-04-25

## 2017-11-27 RX ORDER — DIPHENHYDRAMINE HYDROCHLORIDE 50 MG/ML
25 INJECTION INTRAMUSCULAR; INTRAVENOUS ONCE
Status: COMPLETED | OUTPATIENT
Start: 2017-11-27 | End: 2017-11-27

## 2017-11-27 RX ORDER — DIPHENHYDRAMINE HCL 25 MG
25 CAPSULE ORAL EVERY 4 HOURS PRN
Status: DISCONTINUED | OUTPATIENT
Start: 2017-11-27 | End: 2017-12-01 | Stop reason: HOSPADM

## 2017-11-27 RX ORDER — SIMETHICONE 80 MG
1 TABLET,CHEWABLE ORAL EVERY 6 HOURS PRN
Status: DISCONTINUED | OUTPATIENT
Start: 2017-11-27 | End: 2017-12-01 | Stop reason: HOSPADM

## 2017-11-27 RX ORDER — ONDANSETRON 8 MG/1
8 TABLET, ORALLY DISINTEGRATING ORAL EVERY 8 HOURS PRN
Status: DISCONTINUED | OUTPATIENT
Start: 2017-11-27 | End: 2017-12-01 | Stop reason: HOSPADM

## 2017-11-27 RX ADMIN — DIPHENHYDRAMINE HYDROCHLORIDE 25 MG: 50 INJECTION, SOLUTION INTRAMUSCULAR; INTRAVENOUS at 10:11

## 2017-11-27 RX ADMIN — SODIUM CHLORIDE, SODIUM LACTATE, POTASSIUM CHLORIDE, AND CALCIUM CHLORIDE 1000 ML: .6; .31; .03; .02 INJECTION, SOLUTION INTRAVENOUS at 04:11

## 2017-11-27 RX ADMIN — PROMETHAZINE HYDROCHLORIDE 12.5 MG: 25 INJECTION INTRAMUSCULAR; INTRAVENOUS at 06:11

## 2017-11-27 RX ADMIN — Medication 400 MG: at 03:11

## 2017-11-27 RX ADMIN — MEPERIDINE HYDROCHLORIDE 12.5 MG: 50 INJECTION INTRAMUSCULAR; INTRAVENOUS; SUBCUTANEOUS at 06:11

## 2017-11-27 RX ADMIN — PROMETHAZINE HYDROCHLORIDE 25 MG: 25 TABLET ORAL at 03:11

## 2017-11-27 RX ADMIN — DIPHENHYDRAMINE HYDROCHLORIDE 25 MG: 50 INJECTION, SOLUTION INTRAMUSCULAR; INTRAVENOUS at 06:11

## 2017-11-27 RX ADMIN — PROCHLORPERAZINE EDISYLATE 10 MG: 5 INJECTION INTRAMUSCULAR; INTRAVENOUS at 10:11

## 2017-11-27 NOTE — TELEPHONE ENCOUNTER
Patient anemic based on lab results.  Iron sent to pharmacy and patient called to be notified.  Otherwise, labs so far normal.

## 2017-11-27 NOTE — PROGRESS NOTES
Patient reports having right sided shooting abdominal pain, worse with movement, especially walking.      She also reports feeling dizzy when walking or when sitting.  No chest pain.  Some SOB occasionally.  She also reports occasional heart palpitations.  She has not taken her pulse during this time.  Does report that this occurs with rest.  Declines chest pain but reports some pain in her left upper abdomen when this occurs.  I advised her to monitor her pulse at home.  I have also ordered an EKG and CBC.  Pulse today is 88.      She complains of a persistent headache.  No spots in her vision, RUQ pain.  Has a reported history of migraines.  Usually takes tylenol and fioricet for relief.  She has taken tylenol today, but not the fioricet because she is at work.  Her BP on repeat by me is 144/90.  She has a history of CHTN and is currently on no medication.  In reviewing her previous pregnancies, the patient recalls that she was monitored in the hospital for her BP, but does not remember ever being placed on magnesium sulfate.  I have also ordered P/C ratio and a CMP today.  I have advised the patient to take the remainder of the day off, to go home and take her fioricet.  I have recommended that if her headache is not relieved within an hour of the fioricet, that she go to the [facility].  (she reports fioricet typically works for her within 15 min).     Lastly, she reports feeling tightening in her abdomen throughout the day.  Primarily, these are non painful sensations, but occasionally, she feels discomfort and pressure.  On exam, her cervix is closed, thick, and posterior.    Good fetal movement.  No vaginal bleeding, and no loss of fluid.

## 2017-11-27 NOTE — PROGRESS NOTES
CHAP 2014.288.C routine study visit    I met with Ms. Mckeon in the Ob clinic this morning. Her weight had been obtained clinically- 78.5 kg; urine showed trace amount of protein. She reported dizziness, both while standing/walking and getting up. Her pulse measured 72. She reported no changes in any medication or unplanned visits. She gave me her patient summary from her ED visit in [facility]. She also reports a pain on her left side. Initial clinic BPs measured 150/90. Repeat BP by Dr. Domínguez was 144/90 and pulse was 88.

## 2017-11-27 NOTE — TELEPHONE ENCOUNTER
Called pt and informed her of her IRON levels. Informed her that she can  her iron supplements from her pharmacy. Pt also states that her head ache has not gotten any better.

## 2017-11-27 NOTE — ED TRIAGE NOTES
Pt presented to [facility] after meeting with Dr. Domínguez in clinic.  Pt says she is having contractions which started yesterday and are now 5 minutes apart and rated 10/10 in intensity.  Pt also says she has a headache which was not relieved with Fioricet.  Pt denies RUQ but does report blurry vision and elevated pressures.  Pt gave urine sample and was brought to room 4.  Dr. Diaz notified of pt's arrival.  Pt put on continuous toco and FHTs verified with Doppler (155 bpm).

## 2017-11-27 NOTE — ED PROVIDER NOTES
"Encounter Date: 2017       History     Chief Complaint   Patient presents with    Headache     ctx, blurring vision, elevated BP     Emily Mckeon is a 37 y.o.  at 21w5d who presents to the OB ED from Dr. Domínguez's clinic for a headache not relieved by Fioricet, blurry vision as well as uterine contractions.  The patient states that she has chronic headaches for which she takes Fioricet. Generally Fioricet at least relieves that the pain for "a couple hours" per the patient but has not helped today.  With regard to her vision changes she states that she "always has blurry vision" and this is not a change from her baseline.  She denies right upper quadrant pain, nausea or vomiting.  As for her contractions she states that they started this morning and are occurring every 5 minutes.  She denies vaginal bleeding or leakage of fluids.        This IUP is complicated by a history of  x 3, AMA age and migraines.      Of note, the patient is a participant in the CHAP Trial.           Review of patient's allergies indicates:   Allergen Reactions    Morphine Hives    Pcn [penicillins]      Past Medical History:   Diagnosis Date    Chronic back pain     Elevated cholesterol     Hypertension     Impaired glucose in pregnancy, antepartum     Migraine      Past Surgical History:   Procedure Laterality Date     SECTION, CLASSIC      x3; all at term    LIPOSUCTION W/ FAT INJECTION       Family History   Problem Relation Age of Onset    Breast cancer Neg Hx     Colon cancer Neg Hx     Ovarian cancer Neg Hx      Social History   Substance Use Topics    Smoking status: Never Smoker    Smokeless tobacco: Never Used    Alcohol use No     Review of Systems   Constitutional: Negative for chills and fever.   HENT: Negative for sore throat.    Eyes: Positive for visual disturbance ("Chronic Blurry Vision").   Respiratory: Negative for shortness of breath.    Cardiovascular: Negative for chest pain. "   Gastrointestinal: Positive for abdominal pain (With Contractions). Negative for diarrhea, nausea and vomiting.   Genitourinary: Negative for dysuria.   Musculoskeletal: Negative for back pain.   Skin: Negative for rash.   Neurological: Positive for headaches. Negative for weakness.   Hematological: Does not bruise/bleed easily.       Physical Exam     Temp:  [96.8 °F (36 °C)-98.4 °F (36.9 °C)] 96.9 °F (36.1 °C)  Pulse:  [75-97] 83  Resp:  [16-20] 20  SpO2:  [92 %-100 %] 98 %  BP: (124-196)/(65-94) 124/94    Physical Exam    Vitals reviewed.  Constitutional: She appears well-developed and well-nourished. She is not diaphoretic. No distress.   HENT:   Head: Normocephalic and atraumatic.   Eyes: Conjunctivae are normal.   Neck: Neck supple.   Cardiovascular: Normal rate.   Pulmonary/Chest: Breath sounds normal. No respiratory distress.   Abdominal: She exhibits distension (gravid uterus). There is no tenderness. There is no rebound and no guarding.   Genitourinary: Vagina normal.   Genitourinary Comments:      Neurological: She is alert and oriented to person, place, and time.   Skin: Skin is warm and dry. No rash noted. No erythema.   Psychiatric: She has a normal mood and affect. Her behavior is normal. Judgment and thought content normal.     OB LABOR EXAM:   Pre-Term Labor: No.   Membranes ruptured: No.   Method: Sterile vaginal exam per MD.   Vaginal Bleeding: none present.     Dilatation: 0.   Station: -3.               ED Course   Procedures  Labs Reviewed   PROTEIN / CREATININE RATIO, URINE - Abnormal; Notable for the following:        Result Value    Protein, Urine Random 25 (*)     All other components within normal limits   POCT URINALYSIS, DIPSTICK OR TABLET REAGENT, AUTOMATED, WITH MICROSCOP             Medical Decision Making:   ED Management:  PC Ratio - 0.13  CMP (Earlier Today) - WNL  CBC (Earlier Today) - HGB - 7.8  UA w/ Ketones   1L LR  Phenergine and MagOxide for HA without relief  FHT -  155  TOCO - without clinically significant contractions               Attending Attestation:   Physician Attestation Statement for Resident:  As the supervising MD   Physician Attestation Statement: I have personally seen and examined this patient.   I agree with the above history. -:   As the supervising MD I agree with the above PE.    As the supervising MD I agree with the above treatment, course, plan, and disposition.  I was personally present during the critical portions of the procedure(s) performed by the resident and was immediately available in the ED to provide services and assistance as needed during the entire procedure.  I have reviewed and agree with the residents interpretation of the following: rhythm strips.  I have reviewed the following: old records at this facility.                    ED Course as of Nov 28 1115   Mon Nov 27, 2017   1553 I evaluated Ms. Mckeon with Dr. Diaz and we reviewed plan.  Pt is here at 21 weeks with frequent HA;s, blurry vision and contraction pain every 5 minutes.  She states that fioricet will usually help, it didn't today.   VSS, mild range BP's - but she has CHTN.   They appear to be in her normal range.  Fetal heart tones 155.  She had CBC and CMP today that were normal.  1 ctx noted in first 15 minutes.  Will get P:C ratio, give IV bolus (ketones noted on udip), and try mag oxide and phenergan for HA.  Will check cervix prior to d/c home    []   1709 Cervix closed.  She states that HA unresolved.  She drove, so can not give narcotics.  Case discussed with admit team - will admit and treat overnight  [HU]      ED Course User Index  [HU] Joleen Faustin DO     Clinical Impression:   The primary encounter diagnosis was Pregnancy headache in second trimester. Diagnoses of Blurry vision, Chronic hypertension affecting pregnancy, and 21 weeks gestation of pregnancy were also pertinent to this visit.    Disposition:   Disposition: Discharged  Condition: Stable    -  Will admit patient to Antepartum for additional treatment of unresolved headache.     Will Cabral M.D.  PGY1 OB/GYN               Will Diaz MD  Resident  11/27/17 1752       Joleen Faustin DO  11/28/17 1113

## 2017-11-28 PROBLEM — D64.9 NORMOCYTIC ANEMIA: Status: ACTIVE | Noted: 2017-11-28

## 2017-11-28 PROCEDURE — 25000003 PHARM REV CODE 250: Performed by: OBSTETRICS & GYNECOLOGY

## 2017-11-28 PROCEDURE — 63600175 PHARM REV CODE 636 W HCPCS: Performed by: STUDENT IN AN ORGANIZED HEALTH CARE EDUCATION/TRAINING PROGRAM

## 2017-11-28 PROCEDURE — 25000003 PHARM REV CODE 250: Performed by: STUDENT IN AN ORGANIZED HEALTH CARE EDUCATION/TRAINING PROGRAM

## 2017-11-28 PROCEDURE — 11000001 HC ACUTE MED/SURG PRIVATE ROOM

## 2017-11-28 PROCEDURE — G0378 HOSPITAL OBSERVATION PER HR: HCPCS

## 2017-11-28 PROCEDURE — 63600175 PHARM REV CODE 636 W HCPCS: Performed by: OBSTETRICS & GYNECOLOGY

## 2017-11-28 PROCEDURE — 99222 1ST HOSP IP/OBS MODERATE 55: CPT | Mod: ,,, | Performed by: OBSTETRICS & GYNECOLOGY

## 2017-11-28 RX ORDER — LANOLIN ALCOHOL/MO/W.PET/CERES
400 CREAM (GRAM) TOPICAL DAILY
Status: DISCONTINUED | OUTPATIENT
Start: 2017-11-28 | End: 2017-11-29

## 2017-11-28 RX ORDER — LORAZEPAM 0.5 MG/1
1 TABLET ORAL ONCE
Status: COMPLETED | OUTPATIENT
Start: 2017-11-28 | End: 2017-11-28

## 2017-11-28 RX ORDER — ACETAMINOPHEN 500 MG
1000 TABLET ORAL ONCE
Status: COMPLETED | OUTPATIENT
Start: 2017-11-28 | End: 2017-11-28

## 2017-11-28 RX ORDER — SUMATRIPTAN 50 MG/1
100 TABLET, FILM COATED ORAL ONCE
Status: COMPLETED | OUTPATIENT
Start: 2017-11-28 | End: 2017-11-28

## 2017-11-28 RX ORDER — PROCHLORPERAZINE MALEATE 5 MG
5 TABLET ORAL 3 TIMES DAILY
Status: DISCONTINUED | OUTPATIENT
Start: 2017-11-28 | End: 2017-11-28

## 2017-11-28 RX ORDER — BUTORPHANOL TARTRATE 1 MG/ML
2 INJECTION INTRAMUSCULAR; INTRAVENOUS ONCE
Status: COMPLETED | OUTPATIENT
Start: 2017-11-28 | End: 2017-11-28

## 2017-11-28 RX ORDER — IBUPROFEN 400 MG/1
800 TABLET ORAL ONCE
Status: COMPLETED | OUTPATIENT
Start: 2017-11-28 | End: 2017-11-28

## 2017-11-28 RX ORDER — METOPROLOL SUCCINATE 25 MG/1
25 TABLET, EXTENDED RELEASE ORAL DAILY
Status: DISCONTINUED | OUTPATIENT
Start: 2017-11-28 | End: 2017-11-30

## 2017-11-28 RX ORDER — DOCUSATE SODIUM 100 MG/1
200 CAPSULE, LIQUID FILLED ORAL DAILY
Status: DISCONTINUED | OUTPATIENT
Start: 2017-11-28 | End: 2017-12-01 | Stop reason: HOSPADM

## 2017-11-28 RX ORDER — BUTALBITAL, ACETAMINOPHEN AND CAFFEINE 50; 325; 40 MG/1; MG/1; MG/1
2 TABLET ORAL EVERY 6 HOURS PRN
Status: DISCONTINUED | OUTPATIENT
Start: 2017-11-28 | End: 2017-11-28

## 2017-11-28 RX ORDER — PROCHLORPERAZINE MALEATE 5 MG
5 TABLET ORAL 3 TIMES DAILY PRN
Status: DISCONTINUED | OUTPATIENT
Start: 2017-11-28 | End: 2017-11-29

## 2017-11-28 RX ORDER — FERROUS SULFATE 325(65) MG
325 TABLET, DELAYED RELEASE (ENTERIC COATED) ORAL DAILY
Status: DISCONTINUED | OUTPATIENT
Start: 2017-11-28 | End: 2017-12-01 | Stop reason: HOSPADM

## 2017-11-28 RX ORDER — MAGNESIUM SULFATE HEPTAHYDRATE 40 MG/ML
2 INJECTION, SOLUTION INTRAVENOUS ONCE
Status: COMPLETED | OUTPATIENT
Start: 2017-11-28 | End: 2017-11-28

## 2017-11-28 RX ADMIN — METHYLPREDNISOLONE SODIUM SUCCINATE: 1 INJECTION, POWDER, LYOPHILIZED, FOR SOLUTION INTRAMUSCULAR; INTRAVENOUS at 02:11

## 2017-11-28 RX ADMIN — IBUPROFEN 800 MG: 400 TABLET, FILM COATED ORAL at 08:11

## 2017-11-28 RX ADMIN — DIPHENHYDRAMINE HYDROCHLORIDE 25 MG: 25 CAPSULE ORAL at 05:11

## 2017-11-28 RX ADMIN — DOCUSATE SODIUM 200 MG: 100 CAPSULE, LIQUID FILLED ORAL at 08:11

## 2017-11-28 RX ADMIN — PROCHLORPERAZINE MALEATE 5 MG: 5 TABLET, FILM COATED ORAL at 11:11

## 2017-11-28 RX ADMIN — SUMATRIPTAN SUCCINATE 100 MG: 50 TABLET ORAL at 08:11

## 2017-11-28 RX ADMIN — Medication 400 MG: at 07:11

## 2017-11-28 RX ADMIN — ACETAMINOPHEN 1000 MG: 500 TABLET ORAL at 08:11

## 2017-11-28 RX ADMIN — PROMETHAZINE HYDROCHLORIDE 25 MG: 25 TABLET ORAL at 09:11

## 2017-11-28 RX ADMIN — METOPROLOL SUCCINATE 25 MG: 25 TABLET, EXTENDED RELEASE ORAL at 01:11

## 2017-11-28 RX ADMIN — PRENATAL VIT W/ FE FUMARATE-FA TAB 27-0.8 MG 1 TABLET: 27-0.8 TAB at 08:11

## 2017-11-28 RX ADMIN — LORAZEPAM 1 MG: 0.5 TABLET ORAL at 02:11

## 2017-11-28 RX ADMIN — PROMETHAZINE HYDROCHLORIDE 25 MG: 25 TABLET ORAL at 05:11

## 2017-11-28 RX ADMIN — BUTORPHANOL TARTRATE 2 MG: 1 INJECTION, SOLUTION INTRAMUSCULAR; INTRAVENOUS at 06:11

## 2017-11-28 RX ADMIN — PROCHLORPERAZINE MALEATE 5 MG: 5 TABLET, FILM COATED ORAL at 05:11

## 2017-11-28 RX ADMIN — MAGNESIUM SULFATE IN WATER 2 G: 40 INJECTION, SOLUTION INTRAVENOUS at 08:11

## 2017-11-28 RX ADMIN — FERROUS SULFATE TAB EC 324 MG (65 MG FE EQUIVALENT) 324 MG: 324 (65 FE) TABLET DELAYED RESPONSE at 08:11

## 2017-11-28 NOTE — ASSESSMENT & PLAN NOTE
- FHTS q shift  - no continuous toco unless patient calls outfeeling ctx as patient had no ctx visualized in ob ED  -

## 2017-11-28 NOTE — PROGRESS NOTES
"[facility]  Obstetrics  Antepartum Progress Note    Patient Name: Emily Mckeon  MRN: [id number]  Admission Date: 2017  Hospital Length of Stay: 0 days  Attending Physician: Iliana Domínguez MD  Primary Care Provider: Primary Doctor No    Subjective:     Principal Problem:Headache in pregnancy    HPI:  Emily Mckeon is a 37 y.o.  at 21w5d who presented to the OB ED from Dr. Domínguez's clinic for a headache not relieved by Fioricet, blurry vision as well as uterine contractions.  The patient states that she has chronic headaches for which she takes Fioricet daily. Generally Fioricet at least relieves that the pain for "a couple hours" per the patient but has not helped today.  With regard to her vision changes she states that she "always has blurry vision" and this is not a change from her baseline.  She denies right upper quadrant pain, nausea or vomiting.  As for her contractions she states that they started this morning and are occurring every 5 minutes.  She denies vaginal bleeding or leakage of fluids. This IUP is complicated by chronic Ha, AMA, h/o of c/s x3, and iron deficiency anemia. In the ED she was given magnesium oxide with no relief of her Ha. P/C ratio 0.17. CBC showed anemia at . Of note, patient is in the CHAP trial.     Hospital Course:  2017 Admit to Antepartum for monitor of Bps and HA resolution. Will give patient demerol and phenergran and reassess Ha. P/C 0.13, AST/ALT 40/42, Cr 0.6, platelets 276. BPs normal to mild range (h/o CHTN).     Obstetric HPI:  Patient reports continued HA this morning, though is sleeping comfortably. Reports intermittent visual changes. Denies CP, SOB, RUQ pain. No N/V.     Patient reports None contractions, active fetal movement, absent vaginal bleeding , absent loss of fluid      Objective:     Vital Signs (Most Recent):  Temp: 97.3 °F (36.3 °C) (17 0403)  Pulse: 78 (17 0403)  Resp: 16 (17)  BP: " 137/70 (11/28/17 0403)  SpO2: 100 % (11/27/17 2042) Vital Signs (24h Range):  Temp:  [96.8 °F (36 °C)-98.4 °F (36.9 °C)] 97.3 °F (36.3 °C)  Pulse:  [75-97] 78  Resp:  [16] 16  SpO2:  [92 %-100 %] 100 %  BP: (126-196)/(65-90) 137/70     Weight: 72.6 kg (160 lb)  Body mass index is 30.23 kg/m².    FHT: 150s    No intake or output data in the 24 hours ending 11/28/17 0622    Cervical Exam: def     Significant Labs:  Recent Lab Results       11/27/17  1906 11/27/17  1622 11/27/17  1159 11/27/17  1155      Color, UA         Bilirubin         Spec Grav UA         pH, UA         Protein         Urobilinogen, UA         Nitrite, UA         Immature Granulocytes   0.6(H)      Immature Grans (Abs)   0.05(H)      Albumin   2.7(L)      Alkaline Phosphatase   56      ALT   42      Anion Gap   8      AST   40      Baso #   0.01      Basophil%   0.1      Total Bilirubin   0.2  Comment:  For infants and newborns, interpretation of results should be based  on gestational age, weight and in agreement with clinical  observations.  Premature Infant recommended reference ranges:  Up to 24 hours.............<8.0 mg/dL  Up to 48 hours............<12.0 mg/dL  3-5 days..................<15.0 mg/dL  6-29 days.................<15.0 mg/dL        BUN, Bld   8      Calcium   9.5      Chloride   106      CO2   22(L)      Creatinine   0.6      Creatinine, Random Ur  191.0  Comment:  The random urine reference ranges provided were established   for 24 hour urine collections.  No reference ranges exist for  random urine specimens.  Correlate clinically.    240.0  Comment:  The random urine reference ranges provided were established   for 24 hour urine collections.  No reference ranges exist for  random urine specimens.  Correlate clinically.       Differential Method   Automated      eGFR if    >60.0      eGFR if non    >60.0  Comment:  Calculation used to obtain the estimated glomerular filtration  rate (eGFR) is the  CKD-EPI equation.         Eos #   0.0      Eosinophil%   0.3      Glucose   91      Glucose,  mg/dL        Gran #   6.9      Gran%   75.7(H)      Hematocrit   26.0(L)      Hemoglobin   7.6(L)      Ketones, UA ++moderate        Lymph #   1.3      Lymph%   14.8(L)      MCH   24.1(L)      MCHC   29.2(L)      MCV   83      Mono #   0.8      Mono%   8.5      MPV   9.6      nRBC   0      Platelets   276      Potassium   3.6      Prot/Creat Ratio, Ur  0.13  0.08     Total Protein   7.2      Protein, Urine Random  25  Comment:  The random urine reference ranges provided were established   for 24 hour urine collections.  No reference ranges exist for  random urine specimens.  Correlate clinically.  (H)  20  Comment:  The random urine reference ranges provided were established   for 24 hour urine collections.  No reference ranges exist for  random urine specimens.  Correlate clinically.  (H)     RBC   3.15(L)      RBC, UA         RDW   15.8(H)      Sodium   136      WBC, UA         WBC   9.05            Physical Exam:   Constitutional: She is oriented to person, place, and time. She appears well-developed and well-nourished. No distress.    HENT:   Head: Normocephalic and atraumatic.      Cardiovascular: Normal rate and regular rhythm.     Pulmonary/Chest: Effort normal and breath sounds normal. No respiratory distress.        Abdominal: Soft. She exhibits no distension. There is no tenderness. There is no rebound and no guarding.             Musculoskeletal: Normal range of motion and moves all extremeties. She exhibits no edema or tenderness.       Neurological: She is alert and oriented to person, place, and time.    Skin: Skin is warm and dry.    Psychiatric: She has a normal mood and affect.       Assessment/Plan:     37 y.o. female  at 21w6d for:    * Headache in pregnancy    - takes fioricet daily for chronic HA  - cannot rule out rebound HA at this time  - states that HA has not been improved with any of  administered medications (mag oxide, demerol/phenergan, compazine)  - will restart home fioricet this morning        Normocytic anemia    - H&H 7.6/26.0  - given MCV 83 suspect PEPITO  - start on fe supplementation   - VSS, asymptomatic         Chronic hypertension affecting pregnancy    - BP: (126-196)/(65-90) 137/70  - no pushes required  - on no antihypertensives  - part of CHAP trial  - P/C 0.13, AST/ALT, Cr, platelets wnl        High-risk pregnancy in second trimester    - FHTS q shift  - [place] if complains of ctx; none visualized in ED              Nubia Yeboah MD  Obstetrics  [facility]

## 2017-11-28 NOTE — PLAN OF CARE
Problem: Patient Care Overview  Goal: Plan of Care Review  Pt doing well.  VS stable. No acute changes this shift. Pt denies LOF, vaginal bleeding,  and contractions.  Pt reports positive fetal movement. Patient reports a headache throughout the night. Slightly relieved with IV and PO medications. Will continue to monitor.

## 2017-11-28 NOTE — ASSESSMENT & PLAN NOTE
- takes fioricet daily  - will advise against that for recurrence of rebound HA  - s/p mag oxide in ED which did not help  - will give demerol and phenergan and assess HA

## 2017-11-28 NOTE — HOSPITAL COURSE
2017: Admit to Antepartum for monitor of Bps and HA resolution. Will give patient demerol and phenergran and reassess Ha. P/C 0.13, AST/ALT 40/42, Cr 0.6, platelets 276. BPs normal to mild range (h/o CHTN). Found to be anemic to 7.6/26. Fe supplementation initiated.   2017: Neurology consulted for persistent HA despite multiple medications. Recommended MRI brain without contrast, which was unrevealing. Other recommendations given and employed including scheduled IV steroids, scheduled MgSO4 IV, topomax daily, and prn narcotics. No neurologic deficits.  BPs persistently normal to mild range.   2017: continuing IV steroids, IV magnesium, and IV compazine per neurology recommendations. HA present but improving. Iron studies ordered consistent with iron deficiency anemia. Hg electrophoresis ordered, still pending.   2017: HA stable; overall improved from admit but still somewhat bothersome. Visual symptoms present but unchanged from baseline. Day 3/3 of IV steroids, will discharge with toprol 25mg daily and mag oxide daily. Discussed discontinuation of Fioricet at home. Discussed reasons to return to OB ED including elevated home BP readings >160/>105, focal weakness/numbness, worsening of HA, severe N/V, signs/sx of  labor.     Arranged for patient to have follow-up with ophthalmology today to rule out papilledema/idiopathic intracranial HTN. Patient understands the appointment is at 1330 today on the 10th floor of clinic tower at [facility]. Patient states she is familiar as she works at [employer]. She states she has a ride and will go directly there.

## 2017-11-28 NOTE — PLAN OF CARE
Spoke to pt via telephone after consult ordered for d/c planning.     Introduced self to pt and explained sw role. Pt is being referred to neurology but has never seen one before. There are limited neurologists in the area that take pt insurance ([facility]). Pt address and phone number on face sheet have been confirmed. Pt has been referred to [facility] neurology. Referral has been faxed. Pt will be called with appt in approx 7 days. Sw will f/u with pt to find out appt date and time. D/c should not be held while waiting for appt confirmation. Ob resident aware.     No further sw d/c planning needs.      Jeannette Garcia LCSW    [facility]  [email or web address]    (phone) [phone] or  [place]. [phone]  (fax) [phone]

## 2017-11-28 NOTE — HPI
"Emily Mckeon is a 37 y.o.  at 21w5d who presented to the OB ED from Dr. Domínguez's clinic for a headache not relieved by Fioricet, blurry vision as well as uterine contractions.  The patient states that she has chronic headaches for which she takes Fioricet daily. Generally Fioricet at least relieves that the pain for "a couple hours" per the patient but has not helped today.  With regard to her vision changes she states that she "always has blurry vision" and this is not a change from her baseline.  She denies right upper quadrant pain, nausea or vomiting.  As for her contractions she states that they started this morning and are occurring every 5 minutes.  She denies vaginal bleeding or leakage of fluids. This IUP is complicated by chronic Ha, AMA, h/o of c/s x3, and iron deficiency anemia. In the ED she was given magnesium oxide with no relief of her Ha. P/C ratio 0.17. CBC showed anemia at . Of note, patient is in the [facility] trial.   "

## 2017-11-28 NOTE — ASSESSMENT & PLAN NOTE
- BP: (126-196)/(65-90) 137/70  - no pushes required  - on no antihypertensives  - part of [facility]AP trial  - P/C 0.13, AST/ALT, Cr, platelets wnl

## 2017-11-28 NOTE — ASSESSMENT & PLAN NOTE
- takes fioricet daily for chronic HA  - cannot rule out rebound HA at this time  - states that HA has not been improved with any of administered medications (mag oxide, demerol/phenergan, compazine)  - will restart home fioricet this morning

## 2017-11-28 NOTE — SUBJECTIVE & OBJECTIVE
Obstetric HPI:  Patient reports continued HA this morning, though is sleeping comfortably. Reports intermittent visual changes. Denies CP, SOB, RUQ pain. No N/V.     Patient reports None contractions, active fetal movement, absent vaginal bleeding , absent loss of fluid      Objective:     Vital Signs (Most Recent):  Temp: 97.3 °F (36.3 °C) (11/28/17 0403)  Pulse: 78 (11/28/17 0403)  Resp: 16 (11/28/17 0403)  BP: 137/70 (11/28/17 0403)  SpO2: 100 % (11/27/17 2042) Vital Signs (24h Range):  Temp:  [96.8 °F (36 °C)-98.4 °F (36.9 °C)] 97.3 °F (36.3 °C)  Pulse:  [75-97] 78  Resp:  [16] 16  SpO2:  [92 %-100 %] 100 %  BP: (126-196)/(65-90) 137/70     Weight: 72.6 kg (160 lb)  Body mass index is 30.23 kg/m².    FHT: 150s    No intake or output data in the 24 hours ending 11/28/17 0622    Cervical Exam: def     Significant Labs:  Recent Lab Results       11/27/17  1906 11/27/17  1622 11/27/17  1159 11/27/17  1155      Color, UA         Bilirubin         Spec Grav UA         pH, UA         Protein         Urobilinogen, UA         Nitrite, UA         Immature Granulocytes   0.6(H)      Immature Grans (Abs)   0.05(H)      Albumin   2.7(L)      Alkaline Phosphatase   56      ALT   42      Anion Gap   8      AST   40      Baso #   0.01      Basophil%   0.1      Total Bilirubin   0.2  Comment:  For infants and newborns, interpretation of results should be based  on gestational age, weight and in agreement with clinical  observations.  Premature Infant recommended reference ranges:  Up to 24 hours.............<8.0 mg/dL  Up to 48 hours............<12.0 mg/dL  3-5 days..................<15.0 mg/dL  6-29 days.................<15.0 mg/dL        BUN, Bld   8      Calcium   9.5      Chloride   106      CO2   22(L)      Creatinine   0.6      Creatinine, Random Ur  191.0  Comment:  The random urine reference ranges provided were established   for 24 hour urine collections.  No reference ranges exist for  random urine specimens.   Correlate clinically.    240.0  Comment:  The random urine reference ranges provided were established   for 24 hour urine collections.  No reference ranges exist for  random urine specimens.  Correlate clinically.       Differential Method   Automated      eGFR if    >60.0      eGFR if non    >60.0  Comment:  Calculation used to obtain the estimated glomerular filtration  rate (eGFR) is the CKD-EPI equation.         Eos #   0.0      Eosinophil%   0.3      Glucose   91      Glucose,  mg/dL        Gran #   6.9      Gran%   75.7(H)      Hematocrit   26.0(L)      Hemoglobin   7.6(L)      Ketones, UA ++moderate        Lymph #   1.3      Lymph%   14.8(L)      MCH   24.1(L)      MCHC   29.2(L)      MCV   83      Mono #   0.8      Mono%   8.5      MPV   9.6      nRBC   0      Platelets   276      Potassium   3.6      Prot/Creat Ratio, Ur  0.13  0.08     Total Protein   7.2      Protein, Urine Random  25  Comment:  The random urine reference ranges provided were established   for 24 hour urine collections.  No reference ranges exist for  random urine specimens.  Correlate clinically.  (H)  20  Comment:  The random urine reference ranges provided were established   for 24 hour urine collections.  No reference ranges exist for  random urine specimens.  Correlate clinically.  (H)     RBC   3.15(L)      RBC, UA         RDW   15.8(H)      Sodium   136      WBC, UA         WBC   9.05            Physical Exam:   Constitutional: She is oriented to person, place, and time. She appears well-developed and well-nourished. No distress.    HENT:   Head: Normocephalic and atraumatic.      Cardiovascular: Normal rate and regular rhythm.     Pulmonary/Chest: Effort normal and breath sounds normal. No respiratory distress.        Abdominal: Soft. She exhibits no distension. There is no tenderness. There is no rebound and no guarding.             Musculoskeletal: Normal range of motion and moves all  extremeties. She exhibits no edema or tenderness.       Neurological: She is alert and oriented to person, place, and time.    Skin: Skin is warm and dry.    Psychiatric: She has a normal mood and affect.

## 2017-11-28 NOTE — H&P
"[facility]  Obstetrics  History & Physical    Patient Name: Emily Mckeon  MRN: [id number]  Admission Date: 2017  Primary Care Provider: Primary Doctor No    Subjective:     Principal Problem:Headache in pregnancy    History of Present Illness:  Emily Mckeon is a 37 y.o.  at 21w5d who presented to the OB ED from Dr. Domínguez's clinic for a headache not relieved by Fioricet, blurry vision as well as uterine contractions.  The patient states that she has chronic headaches for which she takes Fioricet daily. Generally Fioricet at least relieves that the pain for "a couple hours" per the patient but has not helped today.  With regard to her vision changes she states that she "always has blurry vision" and this is not a change from her baseline.  She denies right upper quadrant pain, nausea or vomiting.  As for her contractions she states that they started this morning and are occurring every 5 minutes.  She denies vaginal bleeding or leakage of fluids. This IUP is complicated by chronic Ha, AMA, h/o of c/s x3, and iron deficiency anemia. In the ED she was given magnesium oxide with no relief of her Ha. P/C ratio 0.13. CBC showed anemia at . Of note, patient is in the [facility] trial.      Obstetric HPI:     Obstetric History       T3      L3     SAB1   TAB0   Ectopic0   Multiple0   Live Births3       # Outcome Date GA Lbr Alejo/2nd Weight Sex Delivery Anes PTL Lv   5 Current            4 SAB            3 Term 10/08/03 39w0d  2.722 kg (6 lb) M CS-LTranv EPI N EMILY   2 Term 00 39w0d  2.722 kg (6 lb) F CS-LTranv EPI N EMILY   1 Term 99 40w0d  2.722 kg (6 lb) M CS-LTranv   EMILY      Complications: Failure to progress in labor        Past Medical History:   Diagnosis Date    Chronic back pain     Elevated cholesterol     Hypertension     Impaired glucose in pregnancy, antepartum     Migraine      Past Surgical History:   Procedure Laterality Date     SECTION, " CLASSIC      x3; all at term    LIPOSUCTION W/ FAT INJECTION         PTA Medications   Medication Sig    ACETAMINOPHEN (TYLENOL ORAL) Take by mouth.    aspirin 81 MG Chew Take 81 mg by mouth once daily.    cetirizine (ZYRTEC) 10 MG tablet Take 1 tablet (10 mg total) by mouth once daily.    ferrous sulfate 325 mg (65 mg iron) Tab tablet Take 1 tablet (325 mg total) by mouth once daily.    fluticasone (FLONASE) 50 mcg/actuation nasal spray 1 spray by Each Nare route 2 (two) times daily as needed.       Review of patient's allergies indicates:   Allergen Reactions    Demerol [meperidine] Hives    Morphine Hives    Pcn [penicillins]         Family History     None        Social History Main Topics    Smoking status: Never Smoker    Smokeless tobacco: Never Used    Alcohol use No    Drug use: No    Sexual activity: Yes     Partners: Male     Birth control/ protection: None     Review of Systems   Constitutional: Negative for fever.   Eyes: Positive for visual disturbance.   Respiratory: Negative for cough and shortness of breath.    Cardiovascular: Negative for leg swelling.   Gastrointestinal: Positive for abdominal pain (irregular ctx). Negative for vomiting.   Genitourinary: Negative for vaginal bleeding and vaginal discharge.   Neurological: Positive for headaches.   Hematological: Does not bruise/bleed easily.   All other systems reviewed and are negative.     Objective:     Vital Signs (Most Recent):  Temp: 98.4 °F (36.9 °C) (11/27/17 1926)  Pulse: 87 (11/27/17 1930)  Resp: 16 (11/27/17 1543)  BP: 128/75 (11/27/17 1930)  SpO2: 100 % (11/27/17 1643) Vital Signs (24h Range):  Temp:  [96.8 °F (36 °C)-98.4 °F (36.9 °C)] 98.4 °F (36.9 °C)  Pulse:  [83-97] 87  Resp:  [16] 16  SpO2:  [92 %-100 %] 100 %  BP: (127-196)/(65-90) 128/75        There is no height or weight on file to calculate BMI.    FHT: 155  [place]: no ctx     Physical Exam:   Constitutional: She is oriented to person, place, and time. She  appears well-developed and well-nourished. No distress.    HENT:   Head: Normocephalic and atraumatic.      Cardiovascular: Normal rate and regular rhythm.     Pulmonary/Chest: Effort normal.        Abdominal: Soft. She exhibits no distension. There is no tenderness.             Musculoskeletal: Normal range of motion. She exhibits no edema.       Neurological: She is alert and oriented to person, place, and time.    Skin: Skin is warm and dry.    Psychiatric: She has a normal mood and affect.       Cervix:  Dilation:  0     Significant Labs:  Lab Results   Component Value Date    GROUPTRH A POS 08/15/2017    HEPBSAG Negative 08/15/2017       CBC:   Recent Labs  Lab 17  1159   WBC 9.05   RBC 3.15*   HGB 7.6*   HCT 26.0*      MCV 83   MCH 24.1*   MCHC 29.2*     CMP:   Recent Labs  Lab 17  1159   GLU 91   CALCIUM 9.5   ALBUMIN 2.7*   PROT 7.2      K 3.6   CO2 22*      BUN 8   CREATININE 0.6   ALKPHOS 56   ALT 42   AST 40   BILITOT 0.2     Shanice/Creat Ratio:   Recent Labs  Lab 17  1622   UTPCR 0.13       I have personallly reviewed all pertinent lab results from the last 24 hours.    Assessment/Plan:     37 y.o. female  at 21w5d for:    * Headache in pregnancy    - takes fioricet daily  - will advise against that for recurrence of rebound HA  - s/p mag oxide in ED which did not help  - will give demerol and phenergan and assess HA        Chronic hypertension affecting pregnancy    BP: (127-196)/(65-90) 128/75  - no pushes required  - on no antihypertensives  - part of [facility] trial   - p/C ratio 0.13  - CBC and CMP WNL      High-risk pregnancy in second trimester    - FHTS q shift  - no continuous toco unless patient calls outfeeling ctx as patient had no ctx visualized in ob ED  -             Maryam Murillo MD  Obstetrics  [facility]

## 2017-11-28 NOTE — ASSESSMENT & PLAN NOTE
BP: (127-196)/(65-90) 128/75  - no pushes required  - on no antihypertensives  - part of [facility] trial

## 2017-11-28 NOTE — SUBJECTIVE & OBJECTIVE
Obstetric HPI:     Obstetric History       T3      L3     SAB1   TAB0   Ectopic0   Multiple0   Live Births3       # Outcome Date GA Lbr Alejo/2nd Weight Sex Delivery Anes PTL Lv   5 Current            4 2005           3 Term 10/08/03 39w0d  2.722 kg (6 lb) M CS-LTranv EPI N EMILY   2 Term 00 39w0d  2.722 kg (6 lb) F CS-LTranv EPI N EMILY   1 Term 99 40w0d  2.722 kg (6 lb) M CS-LTranv   EMILY      Complications: Failure to progress in labor        Past Medical History:   Diagnosis Date    Chronic back pain     Elevated cholesterol     Hypertension     Impaired glucose in pregnancy, antepartum     Migraine      Past Surgical History:   Procedure Laterality Date     SECTION, CLASSIC      x3; all at term    LIPOSUCTION W/ FAT INJECTION         PTA Medications   Medication Sig    ACETAMINOPHEN (TYLENOL ORAL) Take by mouth.    aspirin 81 MG Chew Take 81 mg by mouth once daily.    cetirizine (ZYRTEC) 10 MG tablet Take 1 tablet (10 mg total) by mouth once daily.    ferrous sulfate 325 mg (65 mg iron) Tab tablet Take 1 tablet (325 mg total) by mouth once daily.    fluticasone (FLONASE) 50 mcg/actuation nasal spray 1 spray by Each Nare route 2 (two) times daily as needed.       Review of patient's allergies indicates:   Allergen Reactions    Demerol [meperidine] Hives    Morphine Hives    Pcn [penicillins]         Family History     None        Social History Main Topics    Smoking status: Never Smoker    Smokeless tobacco: Never Used    Alcohol use No    Drug use: No    Sexual activity: Yes     Partners: Male     Birth control/ protection: None     Review of Systems   Constitutional: Negative for fever.   Eyes: Positive for visual disturbance.   Respiratory: Negative for cough and shortness of breath.    Cardiovascular: Negative for leg swelling.   Gastrointestinal: Positive for abdominal pain (irregular ctx). Negative for vomiting.   Genitourinary: Negative for vaginal  bleeding and vaginal discharge.   Neurological: Positive for headaches.   Hematological: Does not bruise/bleed easily.   All other systems reviewed and are negative.     Objective:     Vital Signs (Most Recent):  Temp: 98.4 °F (36.9 °C) (11/27/17 1926)  Pulse: 87 (11/27/17 1930)  Resp: 16 (11/27/17 1543)  BP: 128/75 (11/27/17 1930)  SpO2: 100 % (11/27/17 1643) Vital Signs (24h Range):  Temp:  [96.8 °F (36 °C)-98.4 °F (36.9 °C)] 98.4 °F (36.9 °C)  Pulse:  [83-97] 87  Resp:  [16] 16  SpO2:  [92 %-100 %] 100 %  BP: (127-196)/(65-90) 128/75        There is no height or weight on file to calculate BMI.    FHT: 155  [place]: no ctx     Physical Exam:   Constitutional: She is oriented to person, place, and time. She appears well-developed and well-nourished. No distress.    HENT:   Head: Normocephalic and atraumatic.      Cardiovascular: Normal rate and regular rhythm.     Pulmonary/Chest: Effort normal.        Abdominal: Soft. She exhibits no distension. There is no tenderness.             Musculoskeletal: Normal range of motion. She exhibits no edema.       Neurological: She is alert and oriented to person, place, and time.    Skin: Skin is warm and dry.    Psychiatric: She has a normal mood and affect.       Cervix:  Dilation:  0     Significant Labs:  Lab Results   Component Value Date    GROUPTRH A POS 08/15/2017    HEPBSAG Negative 08/15/2017       CBC:   Recent Labs  Lab 11/27/17  1159   WBC 9.05   RBC 3.15*   HGB 7.6*   HCT 26.0*      MCV 83   MCH 24.1*   MCHC 29.2*     CMP:   Recent Labs  Lab 11/27/17  1159   GLU 91   CALCIUM 9.5   ALBUMIN 2.7*   PROT 7.2      K 3.6   CO2 22*      BUN 8   CREATININE 0.6   ALKPHOS 56   ALT 42   AST 40   BILITOT 0.2     Shanice/Creat Ratio:   Recent Labs  Lab 11/27/17  1622   UTPCR 0.13       I have personallly reviewed all pertinent lab results from the last 24 hours.

## 2017-11-28 NOTE — ASSESSMENT & PLAN NOTE
- H&H 7.6/26.0  - given MCV 83 suspect PEPITO  - start on fe supplementation   - VSS, asymptomatic

## 2017-11-29 ENCOUNTER — TELEPHONE (OUTPATIENT)
Dept: NEUROLOGY | Facility: CLINIC | Age: 37
End: 2017-11-29

## 2017-11-29 PROBLEM — I67.83 PRES (POSTERIOR REVERSIBLE ENCEPHALOPATHY SYNDROME): Status: ACTIVE | Noted: 2017-11-29

## 2017-11-29 PROCEDURE — 63600175 PHARM REV CODE 636 W HCPCS: Performed by: STUDENT IN AN ORGANIZED HEALTH CARE EDUCATION/TRAINING PROGRAM

## 2017-11-29 PROCEDURE — 99233 SBSQ HOSP IP/OBS HIGH 50: CPT | Mod: ,,, | Performed by: PSYCHIATRY & NEUROLOGY

## 2017-11-29 PROCEDURE — 25000003 PHARM REV CODE 250: Performed by: STUDENT IN AN ORGANIZED HEALTH CARE EDUCATION/TRAINING PROGRAM

## 2017-11-29 PROCEDURE — 11000001 HC ACUTE MED/SURG PRIVATE ROOM

## 2017-11-29 PROCEDURE — 99232 SBSQ HOSP IP/OBS MODERATE 35: CPT | Mod: ,,, | Performed by: OBSTETRICS & GYNECOLOGY

## 2017-11-29 RX ORDER — BUTORPHANOL TARTRATE 1 MG/ML
2 INJECTION INTRAMUSCULAR; INTRAVENOUS ONCE
Status: COMPLETED | OUTPATIENT
Start: 2017-11-29 | End: 2017-11-29

## 2017-11-29 RX ORDER — PROCHLORPERAZINE EDISYLATE 5 MG/ML
10 INJECTION INTRAMUSCULAR; INTRAVENOUS DAILY
Status: DISCONTINUED | OUTPATIENT
Start: 2017-11-29 | End: 2017-11-30

## 2017-11-29 RX ORDER — MAGNESIUM SULFATE HEPTAHYDRATE 40 MG/ML
2 INJECTION, SOLUTION INTRAVENOUS EVERY 12 HOURS
Status: DISCONTINUED | OUTPATIENT
Start: 2017-11-29 | End: 2017-12-01 | Stop reason: HOSPADM

## 2017-11-29 RX ORDER — NAPROXEN SODIUM 220 MG/1
81 TABLET, FILM COATED ORAL DAILY
Status: DISCONTINUED | OUTPATIENT
Start: 2017-11-30 | End: 2017-12-01 | Stop reason: HOSPADM

## 2017-11-29 RX ORDER — TOPIRAMATE 25 MG/1
25 TABLET ORAL DAILY
Status: DISCONTINUED | OUTPATIENT
Start: 2017-11-29 | End: 2017-11-30

## 2017-11-29 RX ORDER — ACETAMINOPHEN 500 MG
1000 TABLET ORAL ONCE
Status: COMPLETED | OUTPATIENT
Start: 2017-11-29 | End: 2017-11-29

## 2017-11-29 RX ORDER — TOPIRAMATE 25 MG/1
25 TABLET ORAL DAILY
Status: DISCONTINUED | OUTPATIENT
Start: 2017-11-29 | End: 2017-11-29

## 2017-11-29 RX ORDER — BUTORPHANOL TARTRATE 1 MG/ML
1 INJECTION INTRAMUSCULAR; INTRAVENOUS EVERY 4 HOURS PRN
Status: DISCONTINUED | OUTPATIENT
Start: 2017-11-29 | End: 2017-12-01 | Stop reason: HOSPADM

## 2017-11-29 RX ORDER — SODIUM CHLORIDE, SODIUM LACTATE, POTASSIUM CHLORIDE, CALCIUM CHLORIDE 600; 310; 30; 20 MG/100ML; MG/100ML; MG/100ML; MG/100ML
INJECTION, SOLUTION INTRAVENOUS CONTINUOUS
Status: DISCONTINUED | OUTPATIENT
Start: 2017-11-29 | End: 2017-12-01 | Stop reason: HOSPADM

## 2017-11-29 RX ADMIN — DIPHENHYDRAMINE HYDROCHLORIDE 25 MG: 25 CAPSULE ORAL at 03:11

## 2017-11-29 RX ADMIN — PRENATAL VIT W/ FE FUMARATE-FA TAB 27-0.8 MG 1 TABLET: 27-0.8 TAB at 09:11

## 2017-11-29 RX ADMIN — BUTORPHANOL TARTRATE 1 MG: 1 INJECTION, SOLUTION INTRAMUSCULAR; INTRAVENOUS at 04:11

## 2017-11-29 RX ADMIN — ONDANSETRON 8 MG: 8 TABLET, ORALLY DISINTEGRATING ORAL at 07:11

## 2017-11-29 RX ADMIN — SIMETHICONE CHEW TAB 80 MG 80 MG: 80 TABLET ORAL at 09:11

## 2017-11-29 RX ADMIN — METHYLPREDNISOLONE SODIUM SUCCINATE: 1 INJECTION, POWDER, LYOPHILIZED, FOR SOLUTION INTRAMUSCULAR; INTRAVENOUS at 04:11

## 2017-11-29 RX ADMIN — DOCUSATE SODIUM 200 MG: 100 CAPSULE, LIQUID FILLED ORAL at 09:11

## 2017-11-29 RX ADMIN — BUTORPHANOL TARTRATE 1 MG: 1 INJECTION, SOLUTION INTRAMUSCULAR; INTRAVENOUS at 09:11

## 2017-11-29 RX ADMIN — ACETAMINOPHEN 1000 MG: 500 TABLET ORAL at 11:11

## 2017-11-29 RX ADMIN — TOPIRAMATE 25 MG: 25 TABLET, FILM COATED ORAL at 04:11

## 2017-11-29 RX ADMIN — BUTORPHANOL TARTRATE 2 MG: 1 INJECTION, SOLUTION INTRAMUSCULAR; INTRAVENOUS at 06:11

## 2017-11-29 RX ADMIN — METOPROLOL SUCCINATE 25 MG: 25 TABLET, EXTENDED RELEASE ORAL at 09:11

## 2017-11-29 RX ADMIN — Medication 400 MG: at 09:11

## 2017-11-29 RX ADMIN — PROCHLORPERAZINE MALEATE 5 MG: 5 TABLET, FILM COATED ORAL at 06:11

## 2017-11-29 RX ADMIN — MAGNESIUM SULFATE IN WATER 2 G: 40 INJECTION, SOLUTION INTRAVENOUS at 03:11

## 2017-11-29 RX ADMIN — PROCHLORPERAZINE EDISYLATE 10 MG: 5 INJECTION INTRAMUSCULAR; INTRAVENOUS at 03:11

## 2017-11-29 RX ADMIN — FERROUS SULFATE TAB EC 324 MG (65 MG FE EQUIVALENT) 324 MG: 324 (65 FE) TABLET DELAYED RESPONSE at 09:11

## 2017-11-29 NOTE — PROGRESS NOTES
"Pt states "feels like I'm getting a cold in my chest. Every time I cough it makes my head ache more". Dr. Andrew notified. No new orders at this time. Am, RN  "

## 2017-11-29 NOTE — ASSESSMENT & PLAN NOTE
- at home takes fioricet daily for chronic HA  - given that current HA possibly related to rebound, will stop fioricet   - patient reports stadol yesterday evening has been only medication to give her relief  - no relief initially with home fioricet then subsequent attempts with different regimens including   - demerol + phenergan (documented rash onset following demerol)   - 2g MgSO4 + Ibuprofen 800mg + Imitrex 100mg + acetaminophen 1g   - ativan + solumedrol   - continue mag oxide 400mg daily and toprol 25mg daily for prophylaxis

## 2017-11-29 NOTE — CONSULTS
[facility]  Neurology  Consult Note    Patient Name: Emily Mckeon  MRN: [id number]  Admission Date: 2017  Hospital Length of Stay: 0 days  Code Status: Full Code   Attending Provider: Iliana Domínguez MD   Consulting Provider: Raffaele Mcgregor III, MD  Primary Care Physician: Primary Doctor No  Principal Problem:Headache in pregnancy    Inpatient consult to Neurology  Consult performed by: RAFFAELE MCGREGOR III  Consult ordered by: THALIA JAMESON         Subjective:     Chief Complaint:  Headache in pregnancy     HPI:   37-year-old  013 presents at 21 weeks 5 days gestational age for evaluation of headache in pregnancy.  She does have a history of located birth process as before with chronic migraine during all of her pregnancies.  She notes that she has been taking Fioricet on a daily basis on the outpatient side.  She notes that a cocktail medication has been tried for her on the inpatient side is actually caused her worsening chest tightness.  I believe this may be related to the sumatriptan that she was prescribed.  She notes that she has had worsening blurring of her vision for the last few weeks as well.  She notes that even when she is not pregnant she has relatively frequent headaches.  She notes that she has a strong family history of headaches including one of her children.  She has had hypertension even when she was not pregnant so there is a question of hypertension versus preeclampsia for which she is being worked up.  She is in a study with [facility] for further evaluation of this.  She has had daily Fioricet so far with no resolution of her headaches.  She has had IV Demerol with only temporary resolution of her headaches.  She has had sumatriptan with no improvement of her headaches and worsening for chest pain and nausea.     Past Medical History:   Diagnosis Date    Chronic back pain     Elevated cholesterol     Hypertension     Impaired glucose in pregnancy,  antepartum     Migraine        Past Surgical History:   Procedure Laterality Date     SECTION, CLASSIC      x3; all at term    LIPOSUCTION W/ FAT INJECTION         Review of patient's allergies indicates:   Allergen Reactions    Demerol [meperidine] Hives    Morphine Hives    Pcn [penicillins]        Current Neurological Medications:     No current facility-administered medications on file prior to encounter.      Current Outpatient Prescriptions on File Prior to Encounter   Medication Sig    ACETAMINOPHEN (TYLENOL ORAL) Take by mouth.    aspirin 81 MG Chew Take 81 mg by mouth once daily.    cetirizine (ZYRTEC) 10 MG tablet Take 1 tablet (10 mg total) by mouth once daily.    ferrous sulfate 325 mg (65 mg iron) Tab tablet Take 1 tablet (325 mg total) by mouth once daily.    fluticasone (FLONASE) 50 mcg/actuation nasal spray 1 spray by Each Nare route 2 (two) times daily as needed.     Family History     None        Social History Main Topics    Smoking status: Never Smoker    Smokeless tobacco: Never Used    Alcohol use No    Drug use: No    Sexual activity: Yes     Partners: Male     Birth control/ protection: None     Review of Systems   Constitutional: Positive for fatigue.   Eyes: Positive for visual disturbance.   Neurological: Positive for dizziness and headaches.   Psychiatric/Behavioral: Positive for decreased concentration, dysphoric mood and sleep disturbance.   All other systems reviewed and are negative.    Objective:     Vital Signs (Most Recent):  Temp: 98.3 °F (36.8 °C) (17 1300)  Pulse: 74 (17 1300)  Resp: 16 (17 1300)  BP: 127/78 (17 1300)  SpO2: 100 % (17 0715) Vital Signs (24h Range):  Temp:  [97.1 °F (36.2 °C)-98.3 °F (36.8 °C)] 98.3 °F (36.8 °C)  Pulse:  [72-95] 74  Resp:  [16] 16  SpO2:  [97 %-100 %] 100 %  BP: (122-142)/(61-91) 127/78     Weight: 72.6 kg (160 lb)  Body mass index is 30.23 kg/m².    Physical Exam   Constitutional: She is  oriented to person, place, and time. She appears well-developed and well-nourished.   HENT:   Head: Normocephalic and atraumatic.   Eyes: Pupils are equal, round, and reactive to light.   Neck: Normal range of motion.   Musculoskeletal: Normal range of motion.   Neurological: She is oriented to person, place, and time. She has normal strength.   Reflex Scores:       Tricep reflexes are 3+ on the right side and 3+ on the left side.       Bicep reflexes are 3+ on the right side and 3+ on the left side.       Brachioradialis reflexes are 3+ on the right side and 3+ on the left side.  Psychiatric: Her speech is normal.       NEUROLOGICAL EXAMINATION:     MENTAL STATUS   Oriented to person, place, and time.   Attention: normal. Concentration: normal.   Speech: speech is normal   Level of consciousness: alert  Knowledge: good.   Normal comprehension. Praxis: normal     CRANIAL NERVES     CN II   Visual acuity: decreased    CN III, IV, VI   Pupils are equal, round, and reactive to light.    CN V   Facial sensation intact.     CN VII   Facial expression full, symmetric.     CN VIII   CN VIII normal.     CN IX, X   CN IX normal.   CN X normal.     CN XI   CN XI normal.     CN XII   CN XII normal.     MOTOR EXAM   Muscle bulk: normal    Strength   Strength 5/5 throughout.     REFLEXES     Reflexes   Right brachioradialis: 3+  Left brachioradialis: 3+  Right biceps: 3+  Left biceps: 3+  Right triceps: 3+  Left triceps: 3+    SENSORY EXAM   Light touch normal.   Pinprick normal.       Significant Labs: All pertinent lab results from the past 24 hours have been reviewed.    Significant Imaging: I have reviewed all pertinent imaging results/findings within the past 24 hours.    Assessment and Plan:     PRES (posterior reversible encephalopathy syndrome)    MRI Brain wo contrast recommened        Pregnancy headache in second trimester    37-year-old female presents for evaluation of multifactorial headaches.  She does have a  history of migraines.  She also has been overusing Fioricet so there may be a component of medication overuse headache here.  She also may be having preeclampsia however the primary team's is working this up currently.  She does not meet all the criteria for as of yet.  She has had some relief with some of the cocktail medication she has had.  It appears that she may be over taking Fioricet aerated also appears that the sumatriptan caused her chest tightness and nausea.      37-year-old female presents for evaluation of headaches in pregnancy.  She is not in her second trimester.  In terms of treatment for her I recommend:  Avoid triptans for now, avoid fioricet for hospitalization  IV fluids  Compazine 10 mg daily  IV opioid, defer to primary team  12.5 mg diphenhydramine to be administered with the Compazine  Solu-Medrol 1000 mg IV 3 times a day ×3 days  Topamax 25 mg by mouth daily  Magnesium sulfate 2 g IV twice a day while hospitalized    Consider lumbar puncture if MRI is unrevealing with documentation of opening pressure and sending for cell count, glucose and protein.          I have spoken to the patient's OB/GYN and relayed my recommendations.  VTE Risk Mitigation         Ordered     Medium Risk of VTE  Once      11/27/17 9413          Thank you for your consult. I will follow-up with patient. Please contact us if you have any additional questions.    Raffaele Mcgregor III, MD  Neurology  [facility]     I have spent a total of more than 50% of a 35 minute visit in chart review, lab review, personal image review, patient centered care, coordination of care and seeing the patient.

## 2017-11-29 NOTE — SUBJECTIVE & OBJECTIVE
Past Medical History:   Diagnosis Date    Chronic back pain     Elevated cholesterol     Hypertension     Impaired glucose in pregnancy, antepartum     Migraine        Past Surgical History:   Procedure Laterality Date     SECTION, CLASSIC      x3; all at term    LIPOSUCTION W/ FAT INJECTION         Review of patient's allergies indicates:   Allergen Reactions    Demerol [meperidine] Hives    Morphine Hives    Pcn [penicillins]        Current Neurological Medications:     No current facility-administered medications on file prior to encounter.      Current Outpatient Prescriptions on File Prior to Encounter   Medication Sig    ACETAMINOPHEN (TYLENOL ORAL) Take by mouth.    aspirin 81 MG Chew Take 81 mg by mouth once daily.    cetirizine (ZYRTEC) 10 MG tablet Take 1 tablet (10 mg total) by mouth once daily.    ferrous sulfate 325 mg (65 mg iron) Tab tablet Take 1 tablet (325 mg total) by mouth once daily.    fluticasone (FLONASE) 50 mcg/actuation nasal spray 1 spray by Each Nare route 2 (two) times daily as needed.     Family History     None        Social History Main Topics    Smoking status: Never Smoker    Smokeless tobacco: Never Used    Alcohol use No    Drug use: No    Sexual activity: Yes     Partners: Male     Birth control/ protection: None     Review of Systems   Constitutional: Positive for fatigue.   Eyes: Positive for visual disturbance.   Neurological: Positive for dizziness and headaches.   Psychiatric/Behavioral: Positive for decreased concentration, dysphoric mood and sleep disturbance.   All other systems reviewed and are negative.    Objective:     Vital Signs (Most Recent):  Temp: 98.3 °F (36.8 °C) (17 1300)  Pulse: 74 (17 1300)  Resp: 16 (17 1300)  BP: 127/78 (17 1300)  SpO2: 100 % (17 0715) Vital Signs (24h Range):  Temp:  [97.1 °F (36.2 °C)-98.3 °F (36.8 °C)] 98.3 °F (36.8 °C)  Pulse:  [72-95] 74  Resp:  [16] 16  SpO2:  [97 %-100 %] 100  %  BP: (122-142)/(61-91) 127/78     Weight: 72.6 kg (160 lb)  Body mass index is 30.23 kg/m².    Physical Exam   Constitutional: She is oriented to person, place, and time. She appears well-developed and well-nourished.   HENT:   Head: Normocephalic and atraumatic.   Eyes: Pupils are equal, round, and reactive to light.   Neck: Normal range of motion.   Musculoskeletal: Normal range of motion.   Neurological: She is oriented to person, place, and time. She has normal strength.   Reflex Scores:       Tricep reflexes are 3+ on the right side and 3+ on the left side.       Bicep reflexes are 3+ on the right side and 3+ on the left side.       Brachioradialis reflexes are 3+ on the right side and 3+ on the left side.  Psychiatric: Her speech is normal.       NEUROLOGICAL EXAMINATION:     MENTAL STATUS   Oriented to person, place, and time.   Attention: normal. Concentration: normal.   Speech: speech is normal   Level of consciousness: alert  Knowledge: good.   Normal comprehension. Praxis: normal     CRANIAL NERVES     CN II   Visual acuity: decreased    CN III, IV, VI   Pupils are equal, round, and reactive to light.    CN V   Facial sensation intact.     CN VII   Facial expression full, symmetric.     CN VIII   CN VIII normal.     CN IX, X   CN IX normal.   CN X normal.     CN XI   CN XI normal.     CN XII   CN XII normal.     MOTOR EXAM   Muscle bulk: normal    Strength   Strength 5/5 throughout.     REFLEXES     Reflexes   Right brachioradialis: 3+  Left brachioradialis: 3+  Right biceps: 3+  Left biceps: 3+  Right triceps: 3+  Left triceps: 3+    SENSORY EXAM   Light touch normal.   Pinprick normal.       Significant Labs: All pertinent lab results from the past 24 hours have been reviewed.    Significant Imaging: I have reviewed all pertinent imaging results/findings within the past 24 hours.

## 2017-11-29 NOTE — ASSESSMENT & PLAN NOTE
- BP: (124-143)/(69-94) 142/91  - no pushes required  - on no antihypertensives  - part of CHAP trial  - P/C 0.13, AST/ALT, Cr, platelets wnl

## 2017-11-29 NOTE — PLAN OF CARE
Problem: Patient Care Overview  Goal: Plan of Care Review  Outcome: Ongoing (interventions implemented as appropriate)  Patient continues to c/o HA. States pain is 10/10 and is unrelieved by medication. Allowed for quiet environment with decreased noise, lighting and minimal interruptions. Denies ctx or leakage of fluid. VSS. Plan of care reviewed with patient. All questions answered.     Malini Koenig, RN

## 2017-11-29 NOTE — TELEPHONE ENCOUNTER
----- Message from Lyn Lucero sent at 11/29/2017  9:24 AM CST -----  Contact: Obinna /  / [facility]  IN HOUSE CONSULT    Patient: Emily Mckeon (mrn# [id number])    Room#  B 394 A    Diagnoses: Refractory Headaches @ 22 weeks pregnant    Ordering Provider: Dr. Domínguez     Requested Provider: Dr. Suarez    Call: Obinna /

## 2017-11-29 NOTE — SUBJECTIVE & OBJECTIVE
Obstetric HPI:  Patient reports brief period of relief yesterday evening following stadol administration, however HA returned around 0100. She went back to sleep thereafter and was able to sleep well. Denies scotoma, CP, SOB, RUQ pain. Denies numbness, tingling, weakness.     Patient reports None contractions, active fetal movement, absent vaginal bleeding , absent loss of fluid      Objective:     Vital Signs (Most Recent):  Temp: 97.1 °F (36.2 °C) (11/28/17 1911)  Pulse: 95 (11/28/17 1911)  Resp: 16 (11/28/17 1911)  BP: (!) 142/91 (11/28/17 1911)  SpO2: 98 % (11/28/17 1911) Vital Signs (24h Range):  Temp:  [96.2 °F (35.7 °C)-97.1 °F (36.2 °C)] 97.1 °F (36.2 °C)  Pulse:  [83-95] 95  Resp:  [16-20] 16  SpO2:  [98 %-100 %] 98 %  BP: (124-143)/(69-94) 142/91     Weight: 72.6 kg (160 lb)  Body mass index is 30.23 kg/m².    FHT: 145 bpm      Intake/Output Summary (Last 24 hours) at 11/29/17 0600  Last data filed at 11/28/17 1445   Gross per 24 hour   Intake              150 ml   Output                0 ml   Net              150 ml       Cervical Exam: def     Significant Labs:  Recent Lab Results     None          Physical Exam:   Constitutional: She is oriented to person, place, and time. She appears well-developed and well-nourished. No distress.    HENT:   Head: Normocephalic and atraumatic.      Cardiovascular: Normal rate and regular rhythm.     Pulmonary/Chest: Effort normal and breath sounds normal. No respiratory distress.        Abdominal: Soft. She exhibits no distension. There is no tenderness. There is no rebound and no guarding.             Musculoskeletal: Normal range of motion and moves all extremeties. She exhibits no edema or tenderness.       Neurological: She is alert and oriented to person, place, and time. No cranial nerve deficit. She exhibits normal muscle tone.   Strength 5/5 BLE and BUE    Skin: Skin is warm and dry.    Psychiatric: She has a normal mood and affect.

## 2017-11-29 NOTE — HPI
37-year-old  013 presents at 21 weeks 5 days gestational age for evaluation of headache in pregnancy.  She does have a history of located birth process as before with chronic migraine during all of her pregnancies.  She notes that she has been taking Fioricet on a daily basis on the outpatient side.  She notes that a cocktail medication has been tried for her on the inpatient side is actually caused her worsening chest tightness.  I believe this may be related to the sumatriptan that she was prescribed.  She notes that she has had worsening blurring of her vision for the last few weeks as well.  She notes that even when she is not pregnant she has relatively frequent headaches.  She notes that she has a strong family history of headaches including one of her children.  She has had hypertension even when she was not pregnant so there is a question of hypertension versus preeclampsia for which she is being worked up.  She is in a study with [facility] for further evaluation of this.  She has had daily Fioricet so far with no resolution of her headaches.  She has had IV Demerol with only temporary resolution of her headaches.  She has had sumatriptan with no improvement of her headaches and worsening for chest pain and nausea.

## 2017-11-29 NOTE — PROGRESS NOTES
"[facility]  Obstetrics  Antepartum Progress Note    Patient Name: Emily Mckeon  MRN: [id number]  Admission Date: 2017  Hospital Length of Stay: 0 days  Attending Physician: Iliana Domínguez MD  Primary Care Provider: Primary Doctor No    Subjective:     Principal Problem:Headache in pregnancy    HPI:  Emily Mckeon is a 37 y.o.  at 21w5d who presented to the OB ED from Dr. Domínguez's clinic for a headache not relieved by Fioricet, blurry vision as well as uterine contractions.  The patient states that she has chronic headaches for which she takes Fioricet daily. Generally Fioricet at least relieves that the pain for "a couple hours" per the patient but has not helped today.  With regard to her vision changes she states that she "always has blurry vision" and this is not a change from her baseline.  She denies right upper quadrant pain, nausea or vomiting.  As for her contractions she states that they started this morning and are occurring every 5 minutes.  She denies vaginal bleeding or leakage of fluids. This IUP is complicated by chronic Ha, AMA, h/o of c/s x3, and iron deficiency anemia. In the ED she was given magnesium oxide with no relief of her Ha. P/C ratio 0.17. CBC showed anemia at . Of note, patient is in the CHAP trial.     Hospital Course:  2017 Admit to Antepartum for monitor of Bps and HA resolution. Will give patient demerol and phenergran and reassess Ha. P/C 0.13, AST/ALT 40/42, Cr 0.6, platelets 276. BPs normal to mild range (h/o CHTN).     Obstetric HPI:  Patient reports brief period of relief yesterday evening following stadol administration, however HA returned around 0100. She went back to sleep thereafter and was able to sleep well. Denies scotoma, CP, SOB, RUQ pain. Denies numbness, tingling, weakness.     Patient reports None contractions, active fetal movement, absent vaginal bleeding , absent loss of fluid      Objective:     Vital Signs (Most " Recent):  Temp: 97.1 °F (36.2 °C) (17)  Pulse: 95 (17)  Resp: 16 (17)  BP: (!) 142/91 (17)  SpO2: 98 % (17) Vital Signs (24h Range):  Temp:  [96.2 °F (35.7 °C)-97.1 °F (36.2 °C)] 97.1 °F (36.2 °C)  Pulse:  [83-95] 95  Resp:  [16-20] 16  SpO2:  [98 %-100 %] 98 %  BP: (124-143)/(69-94) 142/91     Weight: 72.6 kg (160 lb)  Body mass index is 30.23 kg/m².    FHT: 145 bpm      Intake/Output Summary (Last 24 hours) at 17 0600  Last data filed at 17 1445   Gross per 24 hour   Intake              150 ml   Output                0 ml   Net              150 ml       Cervical Exam: def     Significant Labs:  Recent Lab Results     None          Physical Exam:   Constitutional: She is oriented to person, place, and time. She appears well-developed and well-nourished. No distress.    HENT:   Head: Normocephalic and atraumatic.      Cardiovascular: Normal rate and regular rhythm.     Pulmonary/Chest: Effort normal and breath sounds normal. No respiratory distress.        Abdominal: Soft. She exhibits no distension. There is no tenderness. There is no rebound and no guarding.             Musculoskeletal: Normal range of motion and moves all extremeties. She exhibits no edema or tenderness.       Neurological: She is alert and oriented to person, place, and time. No cranial nerve deficit. She exhibits normal muscle tone.   Strength 5/5 BLE and BUE    Skin: Skin is warm and dry.    Psychiatric: She has a normal mood and affect.       Assessment/Plan:     37 y.o. female  at 22w0d for:    * Headache in pregnancy    - at home takes fioricet daily for chronic HA  - given that current HA possibly related to rebound, will stop fioricet   - patient reports stadol yesterday evening has been only medication to give her relief  - no relief initially with home fioricet then subsequent attempts with different regimens including   - demerol + phenergan (documented rash  onset following demerol)   - 2g MgSO4 + Ibuprofen 800mg + Imitrex 100mg + acetaminophen 1g   - ativan + solumedrol   - continue mag oxide 400mg daily and toprol 25mg daily for prophylaxis        Normocytic anemia    - H&H 7.6/26.0  - given MCV 83 suspect PEPITO  - start on fe supplementation   - VSS, asymptomatic         Chronic hypertension affecting pregnancy    - BP: (124-143)/(69-94) 142/91  - no pushes required  - on no antihypertensives  - part of CHAP trial  - P/C 0.13, AST/ALT, Cr, platelets wnl        High-risk pregnancy in second trimester    - FHTS q shift  - continue PNV daily              Nubia Yeboah MD  Obstetrics  [facility]

## 2017-11-30 LAB
FERRITIN SERPL-MCNC: 11 NG/ML
IRON SERPL-MCNC: 25 UG/DL
SATURATED IRON: 4 %
TOTAL IRON BINDING CAPACITY: 599 UG/DL
TRANSFERRIN SERPL-MCNC: 405 MG/DL
TRANSFERRIN SERPL-MCNC: 405 MG/DL

## 2017-11-30 PROCEDURE — 83540 ASSAY OF IRON: CPT

## 2017-11-30 PROCEDURE — 25000003 PHARM REV CODE 250: Performed by: STUDENT IN AN ORGANIZED HEALTH CARE EDUCATION/TRAINING PROGRAM

## 2017-11-30 PROCEDURE — 63600175 PHARM REV CODE 636 W HCPCS: Performed by: STUDENT IN AN ORGANIZED HEALTH CARE EDUCATION/TRAINING PROGRAM

## 2017-11-30 PROCEDURE — 99231 SBSQ HOSP IP/OBS SF/LOW 25: CPT | Mod: ,,, | Performed by: OBSTETRICS & GYNECOLOGY

## 2017-11-30 PROCEDURE — 82728 ASSAY OF FERRITIN: CPT

## 2017-11-30 PROCEDURE — 83020 HEMOGLOBIN ELECTROPHORESIS: CPT

## 2017-11-30 PROCEDURE — 99233 SBSQ HOSP IP/OBS HIGH 50: CPT | Mod: ,,, | Performed by: PSYCHIATRY & NEUROLOGY

## 2017-11-30 PROCEDURE — 11000001 HC ACUTE MED/SURG PRIVATE ROOM

## 2017-11-30 PROCEDURE — 36415 COLL VENOUS BLD VENIPUNCTURE: CPT

## 2017-11-30 PROCEDURE — 83021 HEMOGLOBIN CHROMOTOGRAPHY: CPT

## 2017-11-30 RX ORDER — CALCIUM CARBONATE 200(500)MG
1000 TABLET,CHEWABLE ORAL EVERY 6 HOURS PRN
Status: DISCONTINUED | OUTPATIENT
Start: 2017-11-30 | End: 2017-12-01 | Stop reason: HOSPADM

## 2017-11-30 RX ORDER — DIPHENHYDRAMINE HYDROCHLORIDE 50 MG/ML
12.5 INJECTION INTRAMUSCULAR; INTRAVENOUS DAILY
Status: DISCONTINUED | OUTPATIENT
Start: 2017-11-30 | End: 2017-11-30

## 2017-11-30 RX ORDER — DIPHENHYDRAMINE HYDROCHLORIDE 50 MG/ML
12.5 INJECTION INTRAMUSCULAR; INTRAVENOUS
Status: DISCONTINUED | OUTPATIENT
Start: 2017-11-30 | End: 2017-12-01 | Stop reason: HOSPADM

## 2017-11-30 RX ORDER — PROCHLORPERAZINE EDISYLATE 5 MG/ML
10 INJECTION INTRAMUSCULAR; INTRAVENOUS
Status: DISCONTINUED | OUTPATIENT
Start: 2017-11-30 | End: 2017-12-01 | Stop reason: HOSPADM

## 2017-11-30 RX ORDER — DIPHENHYDRAMINE HCL 12.5MG/5ML
12.5 ELIXIR ORAL DAILY PRN
Status: DISCONTINUED | OUTPATIENT
Start: 2017-11-30 | End: 2017-11-30

## 2017-11-30 RX ORDER — SODIUM CITRATE AND CITRIC ACID MONOHYDRATE 334; 500 MG/5ML; MG/5ML
30 SOLUTION ORAL ONCE
Status: COMPLETED | OUTPATIENT
Start: 2017-11-30 | End: 2017-11-30

## 2017-11-30 RX ADMIN — MAGNESIUM SULFATE IN WATER 2 G: 40 INJECTION, SOLUTION INTRAVENOUS at 02:11

## 2017-11-30 RX ADMIN — BUTORPHANOL TARTRATE 1 MG: 1 INJECTION, SOLUTION INTRAMUSCULAR; INTRAVENOUS at 02:11

## 2017-11-30 RX ADMIN — METHYLPREDNISOLONE SODIUM SUCCINATE: 1 INJECTION, POWDER, LYOPHILIZED, FOR SOLUTION INTRAMUSCULAR; INTRAVENOUS at 05:11

## 2017-11-30 RX ADMIN — PRENATAL VIT W/ FE FUMARATE-FA TAB 27-0.8 MG 1 TABLET: 27-0.8 TAB at 09:11

## 2017-11-30 RX ADMIN — BUTORPHANOL TARTRATE 1 MG: 1 INJECTION, SOLUTION INTRAMUSCULAR; INTRAVENOUS at 06:11

## 2017-11-30 RX ADMIN — ONDANSETRON 8 MG: 8 TABLET, ORALLY DISINTEGRATING ORAL at 10:11

## 2017-11-30 RX ADMIN — DIPHENHYDRAMINE HYDROCHLORIDE 12.5 MG: 50 INJECTION, SOLUTION INTRAMUSCULAR; INTRAVENOUS at 12:11

## 2017-11-30 RX ADMIN — METOPROLOL SUCCINATE 25 MG: 25 TABLET, EXTENDED RELEASE ORAL at 09:11

## 2017-11-30 RX ADMIN — SODIUM CITRATE AND CITRIC ACID MONOHYDRATE 30 ML: 500; 334 SOLUTION ORAL at 03:11

## 2017-11-30 RX ADMIN — PROCHLORPERAZINE EDISYLATE 10 MG: 5 INJECTION INTRAMUSCULAR; INTRAVENOUS at 12:11

## 2017-11-30 RX ADMIN — SODIUM CHLORIDE, SODIUM LACTATE, POTASSIUM CHLORIDE, AND CALCIUM CHLORIDE: 600; 310; 30; 20 INJECTION, SOLUTION INTRAVENOUS at 09:11

## 2017-11-30 RX ADMIN — CALCIUM CARBONATE 1000 MG: 500 TABLET, CHEWABLE ORAL at 08:11

## 2017-11-30 RX ADMIN — STANDARDIZED SENNA CONCENTRATE AND DOCUSATE SODIUM 1 TABLET: 8.6; 5 TABLET, FILM COATED ORAL at 10:11

## 2017-11-30 RX ADMIN — MAGNESIUM SULFATE IN WATER 2 G: 40 INJECTION, SOLUTION INTRAVENOUS at 09:11

## 2017-11-30 RX ADMIN — DEXTROSE: 50 INJECTION, SOLUTION INTRAVENOUS at 05:11

## 2017-11-30 RX ADMIN — DOCUSATE SODIUM 200 MG: 100 CAPSULE, LIQUID FILLED ORAL at 09:11

## 2017-11-30 RX ADMIN — ASPIRIN 81 MG CHEWABLE TABLET 81 MG: 81 TABLET CHEWABLE at 09:11

## 2017-11-30 RX ADMIN — SIMETHICONE CHEW TAB 80 MG 80 MG: 80 TABLET ORAL at 09:11

## 2017-11-30 RX ADMIN — FERROUS SULFATE TAB EC 324 MG (65 MG FE EQUIVALENT) 325 MG: 324 (65 FE) TABLET DELAYED RESPONSE at 09:11

## 2017-11-30 NOTE — ASSESSMENT & PLAN NOTE
- At home takes fioricet daily for chronic HA. Given that current HA possibly related to rebound, will stop fioricet   - Neurology consulted for persistent HA, appreciate recommendations:   - MRI Brain without contrast: no acute abnormality   - IV fluids   - Compazine 10 mg daily.12.5 mg diphenhydramine to be administered with the Compazine.   - IV opioid, defer to primary team   - Solu-Medrol 1000 mg IV 3 times a day ×3 days   - Topamax 25 mg by mouth daily   - Magnesium sulfate 2 g IV twice a day while hospitalized    - Consider lumbar puncture if MRI is unrevealing with documentation of opening pressure and sending for cell count, glucose and protein.

## 2017-11-30 NOTE — PLAN OF CARE
11/30/17 1603   Discharge Assessment   Assessment Type Discharge Planning Assessment   Confirmed/corrected address and phone number on facesheet? Yes   Assessment information obtained from? Patient   Prior to hospitilization cognitive status: Alert/Oriented   Prior to hospitalization functional status: Independent   Current cognitive status: Alert/Oriented   Current Functional Status: Independent   Lives With child(rolan), dependent  (19 y/o son, 16 y/o daughter, and 13 y/o son)   Able to Return to Prior Arrangements yes   Is patient able to care for self after discharge? Yes   Does the patient have transportation home? Yes       Plan  F/u with pt next week regarding outpt neurology appt date and time.      Jeannette Garcia LCSW    [facility]  [email or web address]    (phone) [phone] or  Mdu. [phone]  (fax) [phone]

## 2017-11-30 NOTE — PLAN OF CARE
Problem: Prenatal Patient, Hospitalized (Adult,Obstetrics,Pediatric)  Intervention: Promote/Monitor Maternal/Fetal Well-being  FHT verified  Intervention: Prevent/Manage DVT/VTE Risk  charlie      Problem: Hypertensive Disorders in Pregnancy (Adult,Obstetrics,Pediatric)  Intervention: Closely Monitor Maternal Fluid Balance  Strict I/O

## 2017-11-30 NOTE — CONSULTS
Subjective:       Patient ID: Emily Mckeon is a 37 y.o. female.    Reason for Consult: Headache (ctx, blurring vision, elevated BP)      Interval History:  Emily Mckeon is seen in follow up. Their condition is clinically stable. She notes only minimal improvement of her headache. She notes her headache is now an 8/10. She has not has the same chest tightness now that she is not getting the sumatriptan. We have spoken about her vision, and it seems that is not worse.  We have discussed the results of her MRI.     Objective:     Vitals:    11/30/17 1607   BP: (!) 144/74   Pulse: 78   Resp: 17   Temp: 97.3 °F (36.3 °C)     Pt is awake alert oriented to person place and time.  Moves all 4 extremities against gravity.  Visual acuity is intact to finger counting in peripheral vision is intact to confrontational testing.  Otherwise cranial nerves II through XII without focal deficit.  Focused examination was undertaken today. Over 50% of the 35 minute visit time was spent in chart review, reviewing results from diagnostic studies, face to face discussion of goals of care, symptom assessment, coordination of care and building her treatment plan based on these factors.     Results for orders placed or performed during the hospital encounter of 11/27/17   MRI Brain Without Contrast    Narrative    MRI brain without contrast    11/29/17 14:15:43    Accession# [id number]    CLINICAL INDICATION: 37 year old F with refractory headache      TECHNIQUE: Multiplanar multisequence MR imaging of the brain was performed without the use of intravenous contrast.    COMPARISON: No priors.    FINDINGS:    The ventricles are normal in size for age, without evidence of hydrocephalus.    The brain appears within normal limits. No parenchymal mass, hemorrhage, edema or infarction.    No extra-axial blood or fluid collections.    The T2 skull base flow voids are preserved. Bone marrow signal intensity is unremarkable.    Impression    No evidence of  acute intracranial pathology.      Electronically signed by: KRISTI CONNOR MD  Date:     11/29/17  Time:    14:53    Results for orders placed or performed during the hospital encounter of 12/28/15   CT Head Without Contrast    Narrative    CT brain without contrast.    Comparison: None     Technique: Multiple 5 mm axial images of the head were obtained without intravenous contrast.    Findings: No evidence for acute intracranial hemorrhage or sulcal effacement. The ventricles are normal in size and configuration without evidence for hydrocephalus.  There is no midline shift or mass effect. The visualized paranasal sinuses and mastoid air cells are clear..    Impression     Unremarkable noncontrast CT head specifically without evidence for acute intracranial hemorrhage. Further evaluation as warranted clinically.      Electronically signed by: MARY JANE ESTRADA DO  Date:     12/28/15  Time:    13:02        Assessment:     1. Headache in Pregnancy  2. R/O PRES  3. Medical overuse headache    Plan:        37-year-old female seen for headaches in her second trimester of pregnancy.  I have explained to her that I'm relieved that she is not experiencing posterior reversible encephalopathy syndrome.  I discussed with her that if her vision worsens that I would recommend that the team obtain a lumbar puncture for cell count, protein and glucose as this may help lean towards a diagnosis of preeclampsia.  Otherwise I do not believe she would need a lumbar puncture if her vision does not worsen and if her headaches are improving.  I have reiterated to her that I believe getting Compazine and Benadryl daily as well as Solu-Medrol IV twice daily would be helpful for no longer than 3 days.  I have deferred IV opioid-type medication to the primary team.  I have recommended Topamax however it appears that the primary team has done with Toprol for blood pressure control.  I have explained to the patient that Toprol is not as effective  at controlling headaches.  If her headaches improve and I have no neurologic contraindication to discharge and follow-up in an outpatient setting.  Given her insurance status she may be seen easier in the neurology residency clinic at [facility] or at [facility] [place].  I have no further inpatient neurologic recommendations at this time.  Please call with questions.  The patient verbalizes understanding and agreement with the treatment plan. Questions were sought and answered to her stated verbal satisfaction.        Azul Mcgregor MD      This note is dictated on Dragon Natural Speaking word recognition program. There are word recognition mistakes that are occasionally missed on review.

## 2017-11-30 NOTE — SUBJECTIVE & OBJECTIVE
Obstetric HPI:  This morning patient notes HA to be improving at times. She states it is most severe when she wakes up. She is able to sleep well. Had some N/V yesterday after eating leftover fried rice, resolved this morning.  Denies scotoma, CP, SOB, RUQ pain.     Patient reports None contractions, active fetal movement, absent vaginal bleeding , absent loss of fluid      Objective:     Vital Signs (Most Recent):  Temp: 98.8 °F (37.1 °C) (11/30/17 0243)  Pulse: 87 (11/30/17 0243)  Resp: 18 (11/30/17 0243)  BP: (!) 151/70 (11/30/17 0243)  SpO2: 100 % (11/30/17 0243) Vital Signs (24h Range):  Temp:  [97.3 °F (36.3 °C)-98.8 °F (37.1 °C)] 98.8 °F (37.1 °C)  Pulse:  [72-88] 87  Resp:  [16-18] 18  SpO2:  [97 %-100 %] 100 %  BP: (120-151)/(60-78) 151/70     Weight: 72.6 kg (160 lb)  Body mass index is 30.23 kg/m².    FHT: 145 bpm      Intake/Output Summary (Last 24 hours) at 11/30/17 0640  Last data filed at 11/30/17 0500   Gross per 24 hour   Intake              600 ml   Output                0 ml   Net              600 ml       Cervical Exam: def     Significant Labs:  Recent Lab Results     None          Physical Exam:   Constitutional: She is oriented to person, place, and time. She appears well-developed and well-nourished. No distress.    HENT:   Head: Normocephalic and atraumatic.      Cardiovascular: Normal rate and regular rhythm.     Pulmonary/Chest: Effort normal and breath sounds normal. No respiratory distress.        Abdominal: Soft. She exhibits no distension. There is no tenderness. There is no rebound and no guarding.             Musculoskeletal: Normal range of motion and moves all extremeties. She exhibits no edema or tenderness.       Neurological: She is alert and oriented to person, place, and time. No cranial nerve deficit. She exhibits normal muscle tone.    Skin: Skin is warm and dry.    Psychiatric: She has a normal mood and affect.

## 2017-11-30 NOTE — PROGRESS NOTES
"[facility]  Obstetrics  Antepartum Progress Note    Patient Name: Emily Mckeon  MRN: [id number]  Admission Date: 2017  Hospital Length of Stay: 1 days  Attending Physician: Iliana Domínguez MD  Primary Care Provider: Primary Doctor No    Subjective:     Principal Problem:Headache in pregnancy    HPI:  Emily Mckeon is a 37 y.o.  at 21w5d who presented to the OB ED from Dr. Domínguez's clinic for a headache not relieved by Fioricet, blurry vision as well as uterine contractions.  The patient states that she has chronic headaches for which she takes Fioricet daily. Generally Fioricet at least relieves that the pain for "a couple hours" per the patient but has not helped today.  With regard to her vision changes she states that she "always has blurry vision" and this is not a change from her baseline.  She denies right upper quadrant pain, nausea or vomiting.  As for her contractions she states that they started this morning and are occurring every 5 minutes.  She denies vaginal bleeding or leakage of fluids. This IUP is complicated by chronic Ha, AMA, h/o of c/s x3, and iron deficiency anemia. In the ED she was given magnesium oxide with no relief of her Ha. P/C ratio 0.17. CBC showed anemia at /276. Of note, patient is in the CHAP trial.     Hospital Course:  2017: Admit to Antepartum for monitor of Bps and HA resolution. Will give patient demerol and phenergran and reassess Ha. P/C 0.13, AST/ALT 40/42, Cr 0.6, platelets 276. BPs normal to mild range (h/o CHTN). Found to be anemic to 7.6/26. Fe supplementation initiated.   2017: Neurology consulted for persistent HA despite multiple medications. Recommended MRI brain without contrast, which was unrevealing. Other recommendations given and employed including scheduled IV steroids, scheduled MgSO4 IV, topomax daily, and prn narcotics. BPs persistently normal to mild range.     Obstetric HPI:  This morning patient notes ALONZO to " be improving at times. She states it is most severe when she wakes up. She is able to sleep well. Had some N/V yesterday after eating leftover fried rice, resolved this morning.  Denies scotoma, CP, SOB, RUQ pain.     Patient reports None contractions, active fetal movement, absent vaginal bleeding , absent loss of fluid      Objective:     Vital Signs (Most Recent):  Temp: 98.8 °F (37.1 °C) (17 0243)  Pulse: 87 (17 0243)  Resp: 18 (17 0243)  BP: (!) 151/70 (17)  SpO2: 100 % (17) Vital Signs (24h Range):  Temp:  [97.3 °F (36.3 °C)-98.8 °F (37.1 °C)] 98.8 °F (37.1 °C)  Pulse:  [72-88] 87  Resp:  [16-18] 18  SpO2:  [97 %-100 %] 100 %  BP: (120-151)/(60-78) 151/70     Weight: 72.6 kg (160 lb)  Body mass index is 30.23 kg/m².    FHT: 145 bpm      Intake/Output Summary (Last 24 hours) at 17 0640  Last data filed at 17 0500   Gross per 24 hour   Intake              600 ml   Output                0 ml   Net              600 ml       Cervical Exam: def     Significant Labs:  Recent Lab Results     None          Physical Exam:   Constitutional: She is oriented to person, place, and time. She appears well-developed and well-nourished. No distress.    HENT:   Head: Normocephalic and atraumatic.      Cardiovascular: Normal rate and regular rhythm.     Pulmonary/Chest: Effort normal and breath sounds normal. No respiratory distress.        Abdominal: Soft. She exhibits no distension. There is no tenderness. There is no rebound and no guarding.             Musculoskeletal: Normal range of motion and moves all extremeties. She exhibits no edema or tenderness.       Neurological: She is alert and oriented to person, place, and time. No cranial nerve deficit. She exhibits normal muscle tone.    Skin: Skin is warm and dry.    Psychiatric: She has a normal mood and affect.       Assessment/Plan:     37 y.o. female  at 22w1d for:    * Headache in pregnancy    - At home takes  fioricet daily for chronic HA. Given that current HA possibly related to rebound, will stop fioricet   - Neurology consulted for persistent HA, appreciate recommendations:   - MRI Brain without contrast: no acute abnormality   - IV fluids   - Compazine 10 mg daily.12.5 mg diphenhydramine to be administered with the Compazine.   - IV opioid, defer to primary team   - Solu-Medrol 1000 mg IV 3 times a day ×3 days   - Topamax 25 mg by mouth daily   - Magnesium sulfate 2 g IV twice a day while hospitalized    - Consider lumbar puncture if MRI is unrevealing with documentation of opening pressure and sending for cell count, glucose and protein.          Normocytic anemia    - H&H 7.6/26.0  - given MCV 83 suspect PEPITO   - started on fe supplementation   - VSS        Chronic hypertension affecting pregnancy    - BP: (120-151)/(60-78) 151/70  - no pushes required  - on no antihypertensives  - part of CHAP trial  - P/C 0.13, AST/ALT, Cr, platelets wnl  - cont daily ASA 81  - do not suspect superimposed pre-eclampsia at this time        High-risk pregnancy in second trimester    - FHTS q shift  - continue PNV daily              Nubia Yeboah MD  Obstetrics  [facility]    I have seen and examined the patient and agree with the resident note. The patient is feeling better this am. Her IV infiltrated but she does not have pain or erythema. Will monitor to confirm swelling/fluid extravasation goes down. Discussed holding topamax and using toprol as would be preferred agent in pregnancy. Will do anemia workup. Plan to continue steroids per neuro recommendations until tomorrow and would plan d/c home if doing well. MRI negative-awaiting further neuro follow up but patient appears improved this am. BP normal to mild range.

## 2017-11-30 NOTE — ASSESSMENT & PLAN NOTE
- BP: (120-151)/(60-78) 151/70  - no pushes required  - on no antihypertensives  - part of CHAP trial  - P/C 0.13, AST/ALT, Cr, platelets wnl  - do not suspect superimposed pre-eclampsia at this time

## 2017-12-01 ENCOUNTER — OFFICE VISIT (OUTPATIENT)
Dept: OPHTHALMOLOGY | Facility: CLINIC | Age: 37
End: 2017-12-01
Payer: MEDICAID

## 2017-12-01 VITALS
SYSTOLIC BLOOD PRESSURE: 125 MMHG | BODY MASS INDEX: 30.21 KG/M2 | HEIGHT: 61 IN | OXYGEN SATURATION: 99 % | DIASTOLIC BLOOD PRESSURE: 70 MMHG | RESPIRATION RATE: 18 BRPM | TEMPERATURE: 97 F | HEART RATE: 85 BPM | WEIGHT: 160 LBS

## 2017-12-01 DIAGNOSIS — G44.219 EPISODIC TENSION-TYPE HEADACHE, NOT INTRACTABLE: Primary | ICD-10-CM

## 2017-12-01 PROBLEM — I67.83 PRES (POSTERIOR REVERSIBLE ENCEPHALOPATHY SYNDROME): Status: RESOLVED | Noted: 2017-11-29 | Resolved: 2017-12-01

## 2017-12-01 LAB
HGB A2 MFR BLD HPLC: 2.3 %
HGB FRACT BLD ELPH-IMP: NORMAL
HGB FRACT BLD ELPH-IMP: NORMAL

## 2017-12-01 PROCEDURE — 99238 HOSP IP/OBS DSCHRG MGMT 30/<: CPT | Mod: ,,, | Performed by: OBSTETRICS & GYNECOLOGY

## 2017-12-01 PROCEDURE — 92004 COMPRE OPH EXAM NEW PT 1/>: CPT | Mod: S$PBB,,, | Performed by: OPHTHALMOLOGY

## 2017-12-01 PROCEDURE — 99999 PR PBB SHADOW E&M-EST. PATIENT-LVL III: CPT | Mod: PBBFAC,,, | Performed by: OPHTHALMOLOGY

## 2017-12-01 PROCEDURE — 25000003 PHARM REV CODE 250: Performed by: STUDENT IN AN ORGANIZED HEALTH CARE EDUCATION/TRAINING PROGRAM

## 2017-12-01 PROCEDURE — 25000003 PHARM REV CODE 250: Performed by: OBSTETRICS & GYNECOLOGY

## 2017-12-01 PROCEDURE — 63600175 PHARM REV CODE 636 W HCPCS: Performed by: STUDENT IN AN ORGANIZED HEALTH CARE EDUCATION/TRAINING PROGRAM

## 2017-12-01 PROCEDURE — 99213 OFFICE O/P EST LOW 20 MIN: CPT | Mod: PBBFAC | Performed by: OPHTHALMOLOGY

## 2017-12-01 RX ORDER — FAMOTIDINE 20 MG/1
20 TABLET, FILM COATED ORAL DAILY
Status: DISCONTINUED | OUTPATIENT
Start: 2017-12-01 | End: 2017-12-01 | Stop reason: HOSPADM

## 2017-12-01 RX ORDER — METOPROLOL SUCCINATE 25 MG/1
25 TABLET, EXTENDED RELEASE ORAL DAILY
Qty: 30 TABLET | Refills: 11 | Status: SHIPPED | OUTPATIENT
Start: 2017-12-02 | End: 2018-04-25

## 2017-12-01 RX ORDER — LANOLIN ALCOHOL/MO/W.PET/CERES
400 CREAM (GRAM) TOPICAL DAILY
Refills: 0 | COMMUNITY
Start: 2017-12-01 | End: 2017-12-22

## 2017-12-01 RX ADMIN — CALCIUM CARBONATE 1000 MG: 500 TABLET, CHEWABLE ORAL at 02:12

## 2017-12-01 RX ADMIN — ASPIRIN 81 MG CHEWABLE TABLET 81 MG: 81 TABLET CHEWABLE at 08:12

## 2017-12-01 RX ADMIN — MAGNESIUM SULFATE IN WATER 2 G: 40 INJECTION, SOLUTION INTRAVENOUS at 08:12

## 2017-12-01 RX ADMIN — DEXTROSE: 50 INJECTION, SOLUTION INTRAVENOUS at 05:12

## 2017-12-01 RX ADMIN — DOCUSATE SODIUM 200 MG: 100 CAPSULE, LIQUID FILLED ORAL at 08:12

## 2017-12-01 RX ADMIN — FAMOTIDINE 20 MG: 20 TABLET, FILM COATED ORAL at 10:12

## 2017-12-01 RX ADMIN — FERROUS SULFATE TAB EC 324 MG (65 MG FE EQUIVALENT) 325 MG: 324 (65 FE) TABLET DELAYED RESPONSE at 08:12

## 2017-12-01 RX ADMIN — PRENATAL VIT W/ FE FUMARATE-FA TAB 27-0.8 MG 1 TABLET: 27-0.8 TAB at 08:12

## 2017-12-01 RX ADMIN — CALCIUM CARBONATE 1000 MG: 500 TABLET, CHEWABLE ORAL at 08:12

## 2017-12-01 RX ADMIN — METOPROLOL SUCCINATE 25 MG: 25 TABLET, EXTENDED RELEASE ORAL at 08:12

## 2017-12-01 NOTE — SUBJECTIVE & OBJECTIVE
Obstetric HPI:  HA still present, but improved. Notes it is worse with waking up and when she first stands to walk. Overall she feels that it is improved from time of admit. N/V from 2 days ago resolved.   Denies scotoma, CP, SOB, RUQ pain.     Patient reports None contractions, active fetal movement, absent vaginal bleeding , absent loss of fluid      Objective:     Vital Signs (Most Recent):  Temp: 96.6 °F (35.9 °C) (12/01/17 0408)  Pulse: 103 (12/01/17 0408)  Resp: 18 (12/01/17 0408)  BP: 117/77 (12/01/17 0408)  SpO2: 99 % (12/01/17 0408) Vital Signs (24h Range):  Temp:  [96.6 °F (35.9 °C)-97.3 °F (36.3 °C)] 96.6 °F (35.9 °C)  Pulse:  [] 103  Resp:  [17-18] 18  SpO2:  [97 %-100 %] 99 %  BP: (117-152)/(59-82) 117/77     Weight: 72.6 kg (160 lb)  Body mass index is 30.23 kg/m².    FHT: 150 bpm      Intake/Output Summary (Last 24 hours) at 12/01/17 0648  Last data filed at 12/01/17 0550   Gross per 24 hour   Intake          2568.33 ml   Output             1300 ml   Net          1268.33 ml       Cervical Exam: def     Significant Labs:  Recent Lab Results       11/30/17  0839      Ferritin 11(L)     Iron 25(L)     Saturated Iron 4(L)     TIBC 599(H)     Transferrin 405(H)      405(H)           Physical Exam:   Constitutional: She is oriented to person, place, and time. She appears well-developed and well-nourished. No distress.    HENT:   Head: Normocephalic and atraumatic.      Cardiovascular: Normal rate and regular rhythm.     Pulmonary/Chest: Effort normal and breath sounds normal. No respiratory distress.        Abdominal: Soft. She exhibits no distension. There is no tenderness. There is no rebound and no guarding.             Musculoskeletal: Normal range of motion and moves all extremeties. She exhibits no edema or tenderness.       Neurological: She is alert and oriented to person, place, and time. No cranial nerve deficit. She exhibits normal muscle tone.    Skin: Skin is warm and dry.    Psychiatric:  She has a normal mood and affect.

## 2017-12-01 NOTE — PROGRESS NOTES
Patient seen and examined by Dr. Álvarez in his clinic. Spoke with Dr. Álvarez, no evidence of papilledema/elevated ICP. Patient to be discharged home.     Dr. Keller updated.    Nuiba Andrew MD  OB/GYN, PGY-2

## 2017-12-01 NOTE — PLAN OF CARE
Problem: Patient Care Overview  Goal: Plan of Care Review   @ 22 2/ wga admitted for persistant headache.  Previously stated 10/10 pain, was 8/10 all yesterday & last night.  Did not request pain meds for headache.

## 2017-12-01 NOTE — PROGRESS NOTES
Discharge instructions provided to patient at bedside. Patient states understanding of instructions and willingness to comply.

## 2017-12-01 NOTE — ASSESSMENT & PLAN NOTE
- At home takes fioricet daily for chronic HA. Given that current HA possibly related to rebound, will stop fioricet   - Neurology consulted for persistent HA, appreciate recommendations:   - MRI Brain without contrast: no acute abnormality   - IV fluids   - Compazine 10 mg daily x 3 days.12.5 mg diphenhydramine to be administered with the Compazine.   - IV opioid, defer to primary team   - Solu-Medrol 1000 mg IV 2 times a day ×3 days   - Topamax 25 mg by mouth daily   - Magnesium sulfate 2 g IV twice a day while hospitalized    - Consider lumbar puncture if MRI is unrevealing with documentation of opening pressure and sending for cell count, glucose and protein.  - Opted to continue toprol daily vs topamax in setting of pregnancy  - HA improving

## 2017-12-01 NOTE — ASSESSMENT & PLAN NOTE
- H&H 7.6/26.0, MCV 83   - Iron studies c/w PEPITO   - Ferriton low (11), Iron low (25), TIBC elevated (599), saturated iron low (4), transferrin elevated (405)  - continue Fe supplementation  - Hg electrophoresis pending  - VSS, asymptomatic

## 2017-12-01 NOTE — DISCHARGE INSTRUCTIONS
Call clinic at [phone] or L&D after hours at [phone] for:  · vaginal bleeding  · leakage of fluids  · contractions 4-5 in one hour  · decreased fetal movements (10 kicks in 2 hours)  · headache not relieved by Tylenol  · blurry vision  · temp of 100.4 or greater.     Begin doing fetal kick counts, at least 10 movements in 2 hours starting at 28 weeks gestation. Keep next clinic appointment.

## 2017-12-01 NOTE — DISCHARGE SUMMARY
"[facility]  Obstetrics  Discharge Summary      Patient Name: Emily Mckeon  MRN: [id number]  Admission Date: 2017  Hospital Length of Stay: 2 days  Discharge Date and Time:  2017 11:59 AM  Attending Physician: Iliana Domínguez MD   Discharging Provider: Nubia Yeboha MD  Primary Care Provider: Primary Doctor No    HPI: Emily Mckeon is a 37 y.o.  at 21w5d who presented to the OB ED from Dr. Domínguez's clinic for a headache not relieved by Fioricet, blurry vision as well as uterine contractions.  The patient states that she has chronic headaches for which she takes Fioricet daily. Generally Fioricet at least relieves that the pain for "a couple hours" per the patient but has not helped today.  With regard to her vision changes she states that she "always has blurry vision" and this is not a change from her baseline.  She denies right upper quadrant pain, nausea or vomiting.  As for her contractions she states that they started this morning and are occurring every 5 minutes.  She denies vaginal bleeding or leakage of fluids. This IUP is complicated by chronic Ha, AMA, h/o of c/s x3, and iron deficiency anemia. In the ED she was given magnesium oxide with no relief of her Ha. P/C ratio 0.17. CBC showed anemia at 7//276. Of note, patient is in the CHAP trial.     * No surgery found *     Hospital Course:   2017: Admit to Antepartum for monitor of Bps and HA resolution. Will give patient demerol and phenergran and reassess Ha. P/C 0.13, AST/ALT 40/42, Cr 0.6, platelets 276. BPs normal to mild range (h/o CHTN). Found to be anemic to 7.6/26. Fe supplementation initiated.   2017: Neurology consulted for persistent HA despite multiple medications. Recommended MRI brain without contrast, which was unrevealing. Other recommendations given and employed including scheduled IV steroids, scheduled MgSO4 IV, topomax daily, and prn narcotics. No neurologic deficits.  BPs " persistently normal to mild range.   2017: continuing IV steroids, IV magnesium, and IV compazine per neurology recommendations. HA present but improving. Iron studies ordered consistent with iron deficiency anemia. Hg electrophoresis ordered, still pending.   2017: HA stable; overall improved from admit but still somewhat bothersome. Visual symptoms present but unchanged from baseline. Day 3/3 of IV steroids, will discharge with toprol 25mg daily and mag oxide daily. Discussed discontinuation of Fioricet at home. Discussed reasons to return to OB ED including elevated home BP readings >160/>105, focal weakness/numbness, worsening of HA, severe N/V, signs/sx of  labor.     Arranged for patient to have follow-up with ophthalmology today to rule out papilledema/idiopathic intracranial HTN. Patient understands the appointment is at 1330 today on the 10th floor of clinic tower at [facility]. Patient states she is familiar as she works at [employer]. She states she has a ride and will go directly there.         Consults         Status Ordering Provider     Inpatient consult to Neurology  Once     Provider:  (Not yet assigned)    Completed THALIA JAMESON     Inpatient consult to Social Work  Once     Provider:  (Not yet assigned)    Completed THALIA JAMESON          Final Active Diagnoses:    Diagnosis Date Noted POA    PRINCIPAL PROBLEM:  Headache in pregnancy [O26.899, R51] 2017 Yes    Normocytic anemia [D64.9] 2017 Yes    Pregnancy headache in second trimester [O26.892, R51] 2017 Yes    Chronic hypertension affecting pregnancy [O10.919] 2017 Yes    High-risk pregnancy in second trimester [O09.92] 2017 Not Applicable      Problems Resolved During this Admission:    Diagnosis Date Noted Date Resolved POA    PRES (posterior reversible encephalopathy syndrome) [I67.83] 2017 Yes          Immunizations     Date Immunization Status Dose  Route/Site Given by    12/01/17 1156 Tdap Deleted             This patient has no babies on file.  Pending Diagnostic Studies:     Procedure Component Value Units Date/Time    Hemoglobin Electrophoresis,Hgb A2 Arthur. [[id number]] Collected:  11/30/17 0840    Order Status:  Sent Lab Status:  In process Updated:  11/30/17 1058    Specimen:  Blood from Blood           Recent Labs  Lab 11/27/17  1155 11/27/17  1159 11/27/17  1622   WBC  --  9.05  --    HGB  --  7.6*  --    HCT  --  26.0*  --    PLT  --  276  --    BUN  --  8  --    CREATININE  --  0.6  --    ALT  --  42  --    AST  --  40  --    UTPCR 0.08  --  0.13      Imaging Results          MRI Brain Without Contrast (Final result)  Result time 11/29/17 14:53:41    Final result by Kristi Connor MD (11/29/17 14:53:41)                 Impression:        No evidence of acute intracranial pathology.      Electronically signed by: KRISTI CONNOR MD  Date:     11/29/17  Time:    14:53              Narrative:    MRI brain without contrast    11/29/17 14:15:43    Accession# [id number]    CLINICAL INDICATION: 37 year old F with refractory headache      TECHNIQUE: Multiplanar multisequence MR imaging of the brain was performed without the use of intravenous contrast.    COMPARISON: No priors.    FINDINGS:    The ventricles are normal in size for age, without evidence of hydrocephalus.    The brain appears within normal limits. No parenchymal mass, hemorrhage, edema or infarction.    No extra-axial blood or fluid collections.    The T2 skull base flow voids are preserved. Bone marrow signal intensity is unremarkable.                                Discharged Condition: good    Disposition: Home or Self Care    Follow Up:  Follow-up Information     Kristi voss MD. Go today.    Specialty:  Ophthalmology  Why:  eye exam on 10th floor  Contact information:  [address]  [facility] [id number] [phone]             Schedule an appointment as soon as possible for a  visit with [facility] neurology.    Why:  ([facility] should call with appointment times)           Iliana Domínguez MD. Go on 12/6/2017.    Specialty:  Obstetrics and Gynecology  Why:  for scheduled prenatal visit  Contact information:  [address]  [place] [id number] [phone]                 Patient Instructions:     Diet general     Activity as tolerated     Call MD for:  temperature >100.4     Call MD for:  persistent nausea and vomiting or diarrhea     Call MD for:  severe uncontrolled pain     Call MD for:  persistent dizziness, light-headedness, or visual disturbances     Call MD for:  difficulty breathing or increased cough     Call MD for:  increased confusion or weakness     Call MD for:   Order Comments: Worsening HA, weakness, or numbness.       Medications:  Current Discharge Medication List      START taking these medications    Details   magnesium oxide (MAG-OX) 400 mg tablet Take 1 tablet (400 mg total) by mouth once daily.  Refills: 0      metoprolol succinate (TOPROL-XL) 25 MG 24 hr tablet Take 1 tablet (25 mg total) by mouth once daily.  Qty: 30 tablet, Refills: 11         CONTINUE these medications which have NOT CHANGED    Details   ACETAMINOPHEN (TYLENOL ORAL) Take by mouth.      aspirin 81 MG Chew Take 81 mg by mouth once daily.      cetirizine (ZYRTEC) 10 MG tablet Take 1 tablet (10 mg total) by mouth once daily.  Qty: 30 tablet, Refills: 0      ferrous sulfate 325 mg (65 mg iron) Tab tablet Take 1 tablet (325 mg total) by mouth once daily.  Qty: 30 tablet, Refills: 3    Associated Diagnoses: Iron deficiency anemia secondary to inadequate dietary iron intake      fluticasone (FLONASE) 50 mcg/actuation nasal spray 1 spray by Each Nare route 2 (two) times daily as needed.  Qty: 15 g, Refills: 0             Nubia Yeboah MD  Obstetrics  [facility]

## 2017-12-01 NOTE — LETTER
December 1, 2017      Hernando Suarez MD  [address]  [address]  [facility] [address]           [facility] - Ophthalmology  [address]  [place] [address]  Phone: [phone]  Fax: [phone]          Patient: Emily Mckeon   MR Number: [id number]   YOB: 1980   Date of Visit: 12/1/2017       Dear Dr. Hernando Suarez:    Thank you for referring Emily Mckeon to me for evaluation. Attached you will find relevant portions of my assessment and plan of care.    If you have questions, please do not hesitate to call me. I look forward to following Emily Mckeon along with you.    Sincerely,    Gil Álvarez MD    Enclosure  CC:  No Recipients    If you would like to receive this communication electronically, please contact [email or web address] or [phone] to request more information on 24/7 Card Link access.    For providers and/or their staff who would like to refer a patient to [facility], please contact us through our one-stop-shop provider referral line, [facility], at [phone].    If you feel you have received this communication in error or would no longer like to receive these types of communications, please e-mail [email or web address]

## 2017-12-01 NOTE — PROGRESS NOTES
HPI     Concerns About Ocular Health    Additional comments: Headaches and Vision blurry           Comments   Pt Referred by  per discharge from the hospital.  Pt here for r/o Papellidema or IIH.  Patient states OU vision blurry constant x few months.  Constants Headaches (over the forehead)  9 on pain scale.    22 weeks pregnant.    I have personally interviewed the patient, reviewed the history and   examined the patient and agree with the technician's exam.       Last edited by Gil Álvarez MD on 12/1/2017  1:34 PM. (History)            Assessment /Plan     For exam results, see Encounter Report.    Episodic tension-type headache, not intractable      I found no evidence of elevated intracranial pressure. Return to me as needed.

## 2017-12-01 NOTE — ASSESSMENT & PLAN NOTE
- BP: (117-152)/(59-82) 117/77  - no medications  - P/C 0.13, AST/ALT, Cr, platelets wnl  - do not suspect superimposed pre-eclampsia at this time  - part of CHAP trial

## 2017-12-06 ENCOUNTER — RESEARCH ENCOUNTER (OUTPATIENT)
Dept: RESEARCH | Facility: HOSPITAL | Age: 37
End: 2017-12-06

## 2017-12-06 ENCOUNTER — OFFICE VISIT (OUTPATIENT)
Dept: MATERNAL FETAL MEDICINE | Facility: CLINIC | Age: 37
End: 2017-12-06
Payer: MEDICAID

## 2017-12-06 ENCOUNTER — TELEPHONE (OUTPATIENT)
Dept: OBSTETRICS AND GYNECOLOGY | Facility: CLINIC | Age: 37
End: 2017-12-06

## 2017-12-06 DIAGNOSIS — O10.919 CHRONIC HYPERTENSION AFFECTING PREGNANCY: ICD-10-CM

## 2017-12-06 DIAGNOSIS — O34.10 LEIOMYOMA IN PREGNANCY, UTERINE, ANTEPARTUM: ICD-10-CM

## 2017-12-06 DIAGNOSIS — D25.9 LEIOMYOMA IN PREGNANCY, UTERINE, ANTEPARTUM: ICD-10-CM

## 2017-12-06 PROCEDURE — 76816 OB US FOLLOW-UP PER FETUS: CPT | Mod: PBBFAC | Performed by: OBSTETRICS & GYNECOLOGY

## 2017-12-06 PROCEDURE — 99499 UNLISTED E&M SERVICE: CPT | Mod: S$PBB,,, | Performed by: OBSTETRICS & GYNECOLOGY

## 2017-12-06 PROCEDURE — 76816 OB US FOLLOW-UP PER FETUS: CPT | Mod: 26,S$PBB,, | Performed by: OBSTETRICS & GYNECOLOGY

## 2017-12-06 NOTE — PROGRESS NOTES
OB Ultrasound Report (see PDF version under imaging tab)    Indication  ========    Evaluation of fetal growth/follow up anatomy/CHTN/AMA/fibroids.    History  ======    Previous Outcomes  Preg. no. 1  Outcome: Live YOB: 2001  Gest. age 40 w + 0 d  Gender: male  Details:  6lbs  Preg. no. 2  Outcome: Live YOB: 2002  Gest. age 39 w + 0 d  Gender: female  Details:  6lbs  Preg. no. 3  Outcome: Live YOB: 2005  Gest. age 39 w + 0 d  Gender: male  Details:  6lbs   4  Para 3  Mann children born living (T) 3  Mann children born (T) 3  Mann living children (L) 3  Risk Factors  History risk factors: AMA  History risk factors: Chronic Hypertension  Details: fibroids    Pregnancy History  ===============    Maternal Lab Tests  Result: declined screening  Wants to know gender: yes    Method  ======    Transabdominal ultrasound examination, 2D Color Doppler, Voluson E10.    Pregnancy  =========    Mann pregnancy. Number of fetuses: 1.    Dating  ======    Cycle: regular cycle  Ultrasound examination on: 2017  GA by U/S based upon: AC, BPD, Femur, HC  GA by U/S 21 w + 6 d  RICHARD by U/S: 2018  Assigned: Dating performed on 2017, based on ultrasound (CRL)  Assigned GA 23 w + 0 d  Assigned RICHARD: 2018    General Evaluation  ===============    Cardiac activity: present.  bpm.  Fetal movements: visualized.  Presentation: cephalic.  Placenta:  Placental site: anterior.  Umbilical cord: Cord vessels: 3 vessel cord.  Amniotic fluid: Amount of AF: normal amount. MVP 4.5 cm.    Fetal Biometry  ===========    Fetal Biometry  BPD 48.1 mm 20w 4d Hadlock  OFD 73.3 mm 24w 2d Jessenia  .8 mm 21w 5d Hadlock  .0 mm 22w 5d Hadlock  Femur 38.7 mm 22w 3d Hadlock  Humerus 38.0 mm 23w 3d Jessenia   g 23% Dano  Calculated by: Trevor (BPD-HC-AC-FL)  EFW (lb) 1 lb  EFW (oz) 2 oz  Cephalic index 0.66  HC / AC 1.10  FL /  BPD 0.80  FL / AC 0.22  MVP 4.5 cm   bpm  Head / Face / Neck   7.0 mm    Fetal Anatomy  ===========    Cranium: normal  Posterior fossa: normal  4-chamber view: normal  Aortic arch: normal  Ductal arch: normal  SVC: normal  IVC: normal  3-vessel view: normal  3-vessel-trachea view: normal  Stomach: normal  Kidneys: normal  Bladder: normal  Wants to know gender: yes    Maternal Structures  ===============    Uterus / Cervix  Fibroids: Fibroids identified  Findings: Anterior ML. Intramural  D1 43.9 mm  D2 28.2 mm  D3 40.0 mm  Mean 37.4 mm  Vol 25.883 cm³  Findings: Anterior ML sup. Intramural  D1 19.0 mm  D2 10.9 mm  D3 20.4 mm  Mean 16.8 mm  Vol 2.212 cm³  Findings: Anterior right. Intramural  D1 28.3 mm  D2 22.5 mm  D3 32.4 mm  Mean 27.7 mm  Vol 10.813 cm³  Findings: Anterior ML  D1 26.5 mm  D2 18.5 mm  D3 31.5 mm  Mean 25.5 mm  Vol 8.079 cm³  Findings: Posterior Rt. Submucous  D1 31.2 mm  D2 30.9 mm  D3 32.7 mm  Mean 31.6 mm  Vol 16.532 cm³  Findings: Posterior ML. Intramural  D1 46.3 mm  D2 47.4 mm  D3 50.5 mm  Mean 48.1 mm  Vol 58.093 cm³  Findings: Post Lt. Subserous  D1 26.3 mm  D2 25.4 mm  D3 26.9 mm  Mean 26.2 mm  Vol 9.443 cm³    Impression  =========    A follow up fetal anatomical ultrasound was completed today.  The fetal anatomic survey was completed today, and no fetal structural abnormalities were noted. Interval fetal growth has been  normal, and the AFV is normal. The multiple uterine myomas seen on prior exams are stable in size and appearance.  A f/u exam has been scheduled in 4 weeks to assess fetal growth.

## 2017-12-06 NOTE — PROGRESS NOTES
2014.288.C [facility] routine study visit    I met with Ms. Mckeon in [facility] after her scan. Weight was 79.2 kg. Clinic BP measured 142/76, so BP was repeated using the [employer] device- 121/84. Pulse measured 88. She confirmed that she is taking metoprolol 25 mg/day for headache, starting when she was hospitalized last week. She reported no other ED visits or hospitalizations.     She reported that yesterday about 45 minutes after having difficulty with a bowel movement, she saw blood in the toilet. She took OTC medication she bought from the pharmacy, but is still having difficulty. She reports abdominal pain, per patient possibly from the GI problem, and headache. I suggested that she contact her doctor about this. I will also message her Ob.     She agreed to continue to be followed for [facility]; we will draw study blood along with OB glucose screen.

## 2017-12-06 NOTE — TELEPHONE ENCOUNTER
----- Message from Monica Brice sent at 12/6/2017  9:31 AM CST -----  Contact: NATHAN HUMPHREY [[id number]]  _  1st Request  _  2nd Request  _  3rd Request        Who: NATHAN HUMPHREY [[id number]]  x  Why: Patient states she had blood in stool on yesterday, patient states she is here now because she had an U/S and wanted to know if she should stop by. Please advise.      What Number to Call Back: [phone]    When to Expect a call back: (Before the end of the day)   -- if call after 3:00 call back will be tomorrow.

## 2017-12-07 ENCOUNTER — HOSPITAL ENCOUNTER (EMERGENCY)
Facility: OTHER | Age: 37
Discharge: HOME | End: 2017-12-07
Attending: OBSTETRICS & GYNECOLOGY
Payer: MEDICAID

## 2017-12-07 VITALS
TEMPERATURE: 97 F | SYSTOLIC BLOOD PRESSURE: 140 MMHG | DIASTOLIC BLOOD PRESSURE: 73 MMHG | OXYGEN SATURATION: 100 % | HEART RATE: 102 BPM | RESPIRATION RATE: 16 BRPM

## 2017-12-07 DIAGNOSIS — O47.02 PRETERM UTERINE CONTRACTIONS IN SECOND TRIMESTER, ANTEPARTUM: Primary | ICD-10-CM

## 2017-12-07 DIAGNOSIS — Z3A.23 23 WEEKS GESTATION OF PREGNANCY: ICD-10-CM

## 2017-12-07 DIAGNOSIS — E86.0 DEHYDRATION: ICD-10-CM

## 2017-12-07 LAB
BASOPHILS # BLD AUTO: 0.01 K/UL
BASOPHILS NFR BLD: 0.1 %
BILIRUB UR QL STRIP: NEGATIVE
C TRACH DNA SPEC QL NAA+PROBE: NOT DETECTED
CANDIDA RRNA VAG QL PROBE: POSITIVE
CLARITY UR: CLEAR
COLOR UR: YELLOW
DIFFERENTIAL METHOD: ABNORMAL
EOSINOPHIL # BLD AUTO: 0.1 K/UL
EOSINOPHIL NFR BLD: 0.9 %
ERYTHROCYTE [DISTWIDTH] IN BLOOD BY AUTOMATED COUNT: 16.8 %
FIBRONECTIN FETAL SPEC QL: NEGATIVE
G VAGINALIS RRNA GENITAL QL PROBE: NEGATIVE
GLUCOSE UR QL STRIP: NEGATIVE
HCT VFR BLD AUTO: 28 %
HGB BLD-MCNC: 8.5 G/DL
HGB UR QL STRIP: NEGATIVE
KETONES UR QL STRIP: ABNORMAL
LEUKOCYTE ESTERASE UR QL STRIP: NEGATIVE
LYMPHOCYTES # BLD AUTO: 1.7 K/UL
LYMPHOCYTES NFR BLD: 12 %
MCH RBC QN AUTO: 25 PG
MCHC RBC AUTO-ENTMCNC: 30.4 G/DL
MCV RBC AUTO: 82 FL
MONOCYTES # BLD AUTO: 1.6 K/UL
MONOCYTES NFR BLD: 11.1 %
N GONORRHOEA DNA SPEC QL NAA+PROBE: NOT DETECTED
NEUTROPHILS # BLD AUTO: 10.4 K/UL
NEUTROPHILS NFR BLD: 74.6 %
NITRITE UR QL STRIP: NEGATIVE
PH UR STRIP: 7 [PH] (ref 5–8)
PLATELET # BLD AUTO: 288 K/UL
PMV BLD AUTO: 9.8 FL
PROT UR QL STRIP: NEGATIVE
RBC # BLD AUTO: 3.4 M/UL
SP GR UR STRIP: 1.02 (ref 1–1.03)
T VAGINALIS RRNA GENITAL QL PROBE: NEGATIVE
URN SPEC COLLECT METH UR: ABNORMAL
UROBILINOGEN UR STRIP-ACNC: NEGATIVE EU/DL
WBC # BLD AUTO: 13.97 K/UL

## 2017-12-07 PROCEDURE — 99284 EMERGENCY DEPT VISIT MOD MDM: CPT | Mod: 25

## 2017-12-07 PROCEDURE — 25000003 PHARM REV CODE 250: Performed by: STUDENT IN AN ORGANIZED HEALTH CARE EDUCATION/TRAINING PROGRAM

## 2017-12-07 PROCEDURE — 96372 THER/PROPH/DIAG INJ SC/IM: CPT

## 2017-12-07 PROCEDURE — 87480 CANDIDA DNA DIR PROBE: CPT

## 2017-12-07 PROCEDURE — 99284 EMERGENCY DEPT VISIT MOD MDM: CPT | Mod: ,,, | Performed by: OBSTETRICS & GYNECOLOGY

## 2017-12-07 PROCEDURE — 87660 TRICHOMONAS VAGIN DIR PROBE: CPT

## 2017-12-07 PROCEDURE — 85025 COMPLETE CBC W/AUTO DIFF WBC: CPT

## 2017-12-07 PROCEDURE — 63600175 PHARM REV CODE 636 W HCPCS: Performed by: STUDENT IN AN ORGANIZED HEALTH CARE EDUCATION/TRAINING PROGRAM

## 2017-12-07 PROCEDURE — 96361 HYDRATE IV INFUSION ADD-ON: CPT

## 2017-12-07 PROCEDURE — 81003 URINALYSIS AUTO W/O SCOPE: CPT

## 2017-12-07 PROCEDURE — 82731 ASSAY OF FETAL FIBRONECTIN: CPT

## 2017-12-07 PROCEDURE — 96360 HYDRATION IV INFUSION INIT: CPT

## 2017-12-07 PROCEDURE — 87591 N.GONORRHOEAE DNA AMP PROB: CPT

## 2017-12-07 RX ORDER — TERBUTALINE SULFATE 1 MG/ML
0.25 INJECTION SUBCUTANEOUS ONCE
Status: COMPLETED | OUTPATIENT
Start: 2017-12-07 | End: 2017-12-07

## 2017-12-07 RX ORDER — ACETAMINOPHEN 500 MG
1000 TABLET ORAL EVERY 6 HOURS PRN
Status: DISCONTINUED | OUTPATIENT
Start: 2017-12-07 | End: 2017-12-07 | Stop reason: HOSPADM

## 2017-12-07 RX ADMIN — ACETAMINOPHEN 1000 MG: 500 TABLET ORAL at 12:12

## 2017-12-07 RX ADMIN — TERBUTALINE SULFATE 0.25 MG: 1 INJECTION, SOLUTION SUBCUTANEOUS at 12:12

## 2017-12-07 RX ADMIN — SODIUM CHLORIDE, SODIUM LACTATE, POTASSIUM CHLORIDE, AND CALCIUM CHLORIDE 1000 ML: 600; 310; 30; 20 INJECTION, SOLUTION INTRAVENOUS at 11:12

## 2017-12-07 NOTE — ED PROVIDER NOTES
Encounter Date: 2017       History   No chief complaint on file.    Emily Mckeon is a 37 y.o.  at 23w1d who presents to the OB ED complaining of contractions every 5 minutes.  She states that the contractions started around 8:00 AM yesterday morning. She denies any other obstetrics complaints at this time including leakage of fluid or vaginal bleeding.  She states fetal movements are active and consistent. She denies recent intercourse.    Of note the patient had a recent admission from  to  for BP management and an intractable headache requiring consultation of both the Neurology and Opthalmology services.      This IUP is complicated by AMA,  x 3, migraines, impaired glucose, normocytic anemia, PRES (posterior reversible encephalopathy syndrome) and a fibroid uterus.          Review of patient's allergies indicates:   Allergen Reactions    Demerol [meperidine] Hives    Morphine Hives    Pcn [penicillins]      Past Medical History:   Diagnosis Date    Chronic back pain     Elevated cholesterol     Hypertension     Impaired glucose in pregnancy, antepartum     Migraine      Past Surgical History:   Procedure Laterality Date     SECTION, CLASSIC      x3; all at term    LIPOSUCTION W/ FAT INJECTION       Family History   Problem Relation Age of Onset    Breast cancer Neg Hx     Colon cancer Neg Hx     Ovarian cancer Neg Hx      Social History   Substance Use Topics    Smoking status: Never Smoker    Smokeless tobacco: Never Used    Alcohol use No     Review of Systems   Constitutional: Negative for chills and fever.   HENT: Negative for sore throat.    Eyes: Negative for photophobia and visual disturbance.   Respiratory: Negative for shortness of breath.    Cardiovascular: Negative for chest pain.   Gastrointestinal: Positive for abdominal pain (Contractions). Negative for diarrhea, nausea and vomiting.   Genitourinary: Negative for difficulty urinating, dysuria,  hematuria, vaginal bleeding and vaginal discharge.   Musculoskeletal: Positive for back pain (With contractions).   Skin: Negative for rash.   Neurological: Negative for weakness and headaches.   Hematological: Does not bruise/bleed easily.       Physical Exam     Vitals:    12/07/17 1010 12/07/17 1011   BP:  128/89   BP Location:  Left arm   Patient Position:  Sitting   Pulse: 89 (!) 113   Resp: 16    Temp: 97 °F (36.1 °C)    TempSrc: Temporal    SpO2: 100%        Physical Exam    Vitals reviewed.  Constitutional: She appears well-developed and well-nourished. She is not diaphoretic. No distress.   HENT:   Head: Normocephalic and atraumatic.   Eyes: Conjunctivae are normal.   Neck: Neck supple.   Cardiovascular: Normal rate.   Pulmonary/Chest: Breath sounds normal. No respiratory distress.   Abdominal: Soft. She exhibits no distension (Gravid uterus). There is no tenderness. There is no rebound and no guarding.   Genitourinary: Vagina normal.   Genitourinary Comments: Gravid, NT uterus   Musculoskeletal: She exhibits no edema or tenderness.   Neurological: She is alert and oriented to person, place, and time.   Skin: Skin is warm and dry.   Psychiatric: She has a normal mood and affect. Her behavior is normal. Judgment and thought content normal.     OB LABOR EXAM:   Pre-Term Labor: No.   Membranes ruptured: No.   Method: Sterile vaginal exam per MD and Sterile speculum exam per MD.   Vaginal Bleeding: none present.     Dilatation: 0.   Station: -3.         Comments: Copious vaginal discharge noted. NO CMT.       ED Course   Procedures  Labs Reviewed - No data to display          Medical Decision Making:   ED Management:  TOCO: Contractions q 4-6 min - with decreased contractions S/P Terbutaline  SVE: C/T/H  >>> Unchanged   SSE: Cervix closed on exam  FFN: NEG  Affrim: NEG  GC/CT: Pending  CBC: 12/8/28/280, Leukocytosis consistent with pregnancy, Anemia - known diagnosis  UA: 3 + Ketones >>> 1 L LR  Tylenol 1  g  Terbutaline 0.25 x 1 Subcutaneous                Attending Attestation:   Physician Attestation Statement for Resident:  As the supervising MD   Physician Attestation Statement: I have personally seen and examined this patient.   I agree with the above history. -:   As the supervising MD I agree with the above PE.    As the supervising MD I agree with the above treatment, course, plan, and disposition.   -: Patient evaluated and found to be stable, agree with resident's assessment and plan.  I was personally present during the critical portions of the procedure(s) performed by the resident and was immediately available in the ED to provide services and assistance as needed during the entire procedure.  I have reviewed and agree with the residents interpretation of the following: lab data.  I have reviewed the following: old records at this facility.                    ED Course as of Dec 07 1523   Thu Dec 07, 2017   1027 38 y/o  at 23 weeks presents with contractions since 8 am. Previous c/s X 3, previous work up inpatient for HA.  FHT verified at 150, contractions every 3-5 min, obtain ua,affirm, bolus 1L lr, consider ffn, sse  [AR]   1116 Ffn sent, cervix closed.   [AR]   1217 Ffn negative, 3+ketones on UA, continue IVF  [AR]   1233 Patient with increasing intensity of contractions, repeat doppler of FHTs 145-150, uterus soft, NT, active FM palpated. Will give one dose of terb, no cervical change noted with repeat exam.   [AR]   1426 Patient feeling much better, contractions resolved. Counseled re: hydration, OTC remedies for constipation. F/u 1 week with ob  [AR]      ED Course User Index  [AR] Malini Stiles MD     Clinical Impression:   The primary encounter diagnosis was  uterine contractions in second trimester, antepartum. Diagnoses of 23 weeks gestation of pregnancy and Dehydration were also pertinent to this visit.    Disposition:   Disposition: Discharged  Condition: Stable    -  Patients painful contractions resolved with hydration, PO pain medication and Brethine  - No cervical change noted in the context of regular contractions  - FFN - NEG  - Symptoms likely exacerbated secondary to dehydration  - AFFIRM - Positive for yeast. Patient asymptomatic. Do not feel treatment is needed at this time.   - Laboratory work-up without clinically significant findings  - Counseled patient on the importance of proper hydration  - Also provided education regarding constipation.  Instructed patient to increase water intake, daily stool softener and Miralax as directed until 1 bowel movement.   - Strict labor precautions provided.  - Patient to follow-up with Primary OBGYN    Will Cabral M.D.  PGY1 OB/GYN               Will Diaz MD  Resident  12/07/17 5191       Malini Stiles MD  12/08/17 5891

## 2017-12-07 NOTE — ED NOTES
Bolus of fluids & terbutaline given. Patient reports that ctx have spaced out to every 15 minutes. FHT verified before discharge with doppler 140-150 bpm. Cervix is still closed. Discharge instructions given to patient and she verbalizes understanding.

## 2017-12-07 NOTE — DISCHARGE INSTRUCTIONS
Call clinic [phone] or L & D after hours at [phone] for vaginal bleeding, leakage of fluids, regular contractions every 5 mins for 2 hours, decreased fetal movements ( 10 kicks in 2 hours), headache not relieved by Tylenol, blurry vision, or temp of 100.4 or greater.  Begin doing fetal kick counts, at least 10 movements in 2 hours starting at 28 weeks gestation.  Keep next clinic appointment

## 2017-12-22 ENCOUNTER — ROUTINE PRENATAL (OUTPATIENT)
Dept: OBSTETRICS AND GYNECOLOGY | Facility: CLINIC | Age: 37
End: 2017-12-22
Payer: MEDICAID

## 2017-12-22 VITALS
SYSTOLIC BLOOD PRESSURE: 140 MMHG | BODY MASS INDEX: 32.57 KG/M2 | WEIGHT: 172.38 LBS | DIASTOLIC BLOOD PRESSURE: 88 MMHG

## 2017-12-22 DIAGNOSIS — G44.229 CHRONIC TENSION-TYPE HEADACHE, NOT INTRACTABLE: Primary | ICD-10-CM

## 2017-12-22 DIAGNOSIS — O09.92 HIGH-RISK PREGNANCY IN SECOND TRIMESTER: ICD-10-CM

## 2017-12-22 PROCEDURE — 99212 OFFICE O/P EST SF 10 MIN: CPT | Mod: PBBFAC | Performed by: OBSTETRICS & GYNECOLOGY

## 2017-12-22 PROCEDURE — 99999 PR PBB SHADOW E&M-EST. PATIENT-LVL II: CPT | Mod: PBBFAC,,, | Performed by: OBSTETRICS & GYNECOLOGY

## 2017-12-22 PROCEDURE — 99213 OFFICE O/P EST LOW 20 MIN: CPT | Mod: TH,S$PBB,, | Performed by: OBSTETRICS & GYNECOLOGY

## 2017-12-22 RX ORDER — LANOLIN ALCOHOL/MO/W.PET/CERES
400 CREAM (GRAM) TOPICAL DAILY
Qty: 30 TABLET | Refills: 3 | Status: SHIPPED | OUTPATIENT
Start: 2017-12-22 | End: 2018-04-25

## 2017-12-22 NOTE — PROGRESS NOTES
Repeat /86.  Good fetal movement.  Is jimbo- but this is her baseline.  She recently went to the [facility] for q5 minute contractions that were painful.  Not currently having painful contractions.  No vaginal bleeding, and no loss of fluid.   Patient reports leg pain upon standing- upper thighs.  Pain is bilateral, improves with walking.  Recommended belly band to support belly off of inguinal folds.   Patient is having headaches, but it has improved from her hospital stay.  No longer using fioricet.  She is taking tylenol and toprol xl.  Is not taking magnesium oxide.  I sent this to the pharmacy for her and told her to pick it up and take it.  Reports blurry vision, no spots.  No RUQ pain.    Is still struggling with constipation.  Discussed a regimen for fiber.  Not currently taking iron because of constipation.

## 2018-01-03 ENCOUNTER — OFFICE VISIT (OUTPATIENT)
Dept: MATERNAL FETAL MEDICINE | Facility: CLINIC | Age: 38
End: 2018-01-03
Payer: MEDICAID

## 2018-01-03 VITALS — DIASTOLIC BLOOD PRESSURE: 78 MMHG | SYSTOLIC BLOOD PRESSURE: 128 MMHG

## 2018-01-03 DIAGNOSIS — O10.919 CHRONIC HYPERTENSION AFFECTING PREGNANCY: Primary | ICD-10-CM

## 2018-01-03 DIAGNOSIS — Z36.89 ENCOUNTER FOR ULTRASOUND TO CHECK FETAL GROWTH: Primary | ICD-10-CM

## 2018-01-03 DIAGNOSIS — O10.919 CHRONIC HYPERTENSION AFFECTING PREGNANCY: ICD-10-CM

## 2018-01-03 PROCEDURE — 99999 PR PBB SHADOW E&M-EST. PATIENT-LVL I: CPT | Mod: PBBFAC,,,

## 2018-01-03 PROCEDURE — 76816 OB US FOLLOW-UP PER FETUS: CPT | Mod: 26,S$PBB,, | Performed by: OBSTETRICS & GYNECOLOGY

## 2018-01-03 PROCEDURE — 76816 OB US FOLLOW-UP PER FETUS: CPT | Mod: PBBFAC | Performed by: OBSTETRICS & GYNECOLOGY

## 2018-01-03 PROCEDURE — 99499 UNLISTED E&M SERVICE: CPT | Mod: S$PBB,,, | Performed by: OBSTETRICS & GYNECOLOGY

## 2018-01-03 PROCEDURE — 99211 OFF/OP EST MAY X REQ PHY/QHP: CPT | Mod: PBBFAC

## 2018-01-03 NOTE — PROGRESS NOTES
OB Ultrasound Report (see PDF version under imaging tab)    Indication  ========    Evaluation of fetal growth - maternal CHTN.    History  ======    Previous Outcomes  Preg. no. 1  Outcome: Live YOB: 2001  Gest. age 40 w + 0 d  Gender: male  Details:  6lbs  Preg. no. 2  Outcome: Live YOB: 2002  Gest. age 39 w + 0 d  Gender: female  Details:  6lbs  Preg. no. 3  Outcome: Live YOB: 2005  Gest. age 39 w + 0 d  Gender: male  Details:  6lbs   4  Para 3  Mann children born living (T) 3  Mann children born (T) 3  Mann living children (L) 3  Risk Factors  History risk factors: AMA  History risk factors: Chronic Hypertension  Details: fibroids    Pregnancy History  ===============    Maternal Lab Tests  Result: declined screening  Wants to know gender: yes    Maternal Assessment  ================    BP syst 128 mmHg  BP diast 78 mmHg    Method  ======    Transabdominal ultrasound examination. View: Sufficient.    Pregnancy  =========    Mann pregnancy. Number of fetuses: 1.    Dating  ======    Cycle: regular cycle  Ultrasound examination on: 1/3/2018  GA by U/S based upon: AC, BPD, Femur, HC  GA by U/S 26 w + 5 d  RICHARD by U/S: 2018  Assigned: Dating performed on 2017, based on ultrasound (CRL)  Assigned GA 27 w + 0 d  Assigned RICHARD: 2018    General Evaluation  ===============    Cardiac activity: present.  bpm.  Fetal movements: visualized.  Presentation: cephalic.  Placenta: anterior.  Umbilical cord: 3 vessel cord.  Amniotic fluid: MVP 7.9 cm. DONA 23.1 cm. Q1 5.8 cm, Q2 5.1 cm, Q3 4.7 cm, Q4 7.5 cm.    Fetal Biometry  ===========    Fetal Biometry  BPD 63.6 mm 25w 5d Hadlock  OFD 91.3 mm 29w 3d Jessenia  .4 mm 27w 1d Hadlock  .3 mm 26w 5d Hadlock  Femur 50.8 mm 27w 2d Hadlock  CM 5.4 mm   g 43% Dano  Calculated by: Hadlock (BPD-HC-AC-FL)  EFW (lb) 2 lb  EFW (oz) 3 oz  Cephalic index 0.70  HC  / AC 1.13  FL / BPD 0.80  FL / AC 0.23  MVP 7.9 cm  DONA 23.1 cm   bpm  Head / Face / Neck   5.5 mm    Fetal Anatomy  ===========    Cranium: normal  Lateral ventricles: normal  Stomach: normal  Kidneys: normal  Bladder: normal  Wants to know gender: yes  Other: A full anatomy survey previously performed.    Maternal Structures  ===============    Uterus / Cervix  Fibroids: Fibroids identified  Findings: Subserous. anterior/ ml  D1 31.4 mm  D2 23.5 mm  D3 33.8 mm  Mean 29.6 mm  Vol 13.078 cm³  Findings: Subserous. anterior/ ml  D1 22.2 mm  D2 12.5 mm  D3 21.0 mm  Mean 18.6 mm  Vol 3.046 cm³  Findings: Intramural, anterior/ rt  D1 19.1 mm  D2 17.7 mm  D3 19.7 mm  Mean 18.8 mm  Vol 3.478 cm³  Findings: Subserous. fundal/ rt  D1 42.1 mm  D2 40.9 mm  D3 42.4 mm  Mean 41.8 mm  Vol 38.326 cm³  Findings: Subserous, fundal/ rt  D1 38.2 mm  D2 32.9 mm  D3 38.2 mm  Mean 36.4 mm  Vol 25.098 cm³  Findings: Subserous, posterior/ rt  D1 17.8 mm  D2 17.8 mm  D3 23.5 mm  Mean 19.7 mm  Vol 3.909 cm³    Impression  =========    Fetal size is AGA with the EFW at the 43rd percentile.  Normal repeat limited fetal anatomic survey. AFV is in the upper normal range.  Multiple small uterine myomas are again noted.    Recommendation  ==============    F/U exam to assess fetal growth in 4 weeks.

## 2018-01-12 ENCOUNTER — RESEARCH ENCOUNTER (OUTPATIENT)
Dept: RESEARCH | Facility: HOSPITAL | Age: 38
End: 2018-01-12

## 2018-01-12 ENCOUNTER — ROUTINE PRENATAL (OUTPATIENT)
Dept: OBSTETRICS AND GYNECOLOGY | Facility: CLINIC | Age: 38
End: 2018-01-12
Payer: MEDICAID

## 2018-01-12 VITALS — SYSTOLIC BLOOD PRESSURE: 125 MMHG | DIASTOLIC BLOOD PRESSURE: 75 MMHG | WEIGHT: 176 LBS | BODY MASS INDEX: 33.25 KG/M2

## 2018-01-12 DIAGNOSIS — O09.92 HIGH-RISK PREGNANCY IN SECOND TRIMESTER: Primary | ICD-10-CM

## 2018-01-12 DIAGNOSIS — O10.919 CHRONIC HYPERTENSION AFFECTING PREGNANCY: ICD-10-CM

## 2018-01-12 PROCEDURE — 99999 PR PBB SHADOW E&M-EST. PATIENT-LVL III: CPT | Mod: PBBFAC,,, | Performed by: OBSTETRICS & GYNECOLOGY

## 2018-01-12 PROCEDURE — 99213 OFFICE O/P EST LOW 20 MIN: CPT | Mod: TH,S$PBB,, | Performed by: OBSTETRICS & GYNECOLOGY

## 2018-01-12 PROCEDURE — 99213 OFFICE O/P EST LOW 20 MIN: CPT | Mod: PBBFAC | Performed by: OBSTETRICS & GYNECOLOGY

## 2018-01-12 NOTE — PROGRESS NOTES
Good fetal movement.  No contractions, no vaginal bleeding, and no loss of fluid.  Is complaining of round ligament pain.  Also has leg pain.  Recommended belly band.  Patient is taking daily fiber- 3 tsp a day.  Is having regular bowel movements.  Not needing stool softener.  Had stopped iron because of constipation.  H/H is 7.5/25.  Patient advised to take iron twice a day with juice.  Decreased to once a day if worsening constipation.  Advised to avoid milk/tums when taking iron.  Will recheck CBC in one month.    Headaches are resolved with fioricet and with not sitting in front of the computer.  She also has noticed that she has fewer headaches with glasses.  Concern that the contacts are not the right strength. Will plan to go to optometrist to get strength of glasses checked.   TDAP today.    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  Advised her to avoid patients with the flu, wash hands.

## 2018-01-12 NOTE — PROGRESS NOTES
2014.288.C [facility] study routine study visit    I met with Ms. Mckeon in the Ob clinic this morning. Her weight measured clinically at 79.8kg. Clinical BP was 125/75, and urine dipstick was negative for protein. She complained about abdominal pain, denied changes in medication or unplanned visits. Her pulse measured 88. She had no questions.

## 2018-01-18 ENCOUNTER — TELEPHONE (OUTPATIENT)
Dept: OBSTETRICS AND GYNECOLOGY | Facility: CLINIC | Age: 38
End: 2018-01-18

## 2018-01-18 NOTE — TELEPHONE ENCOUNTER
----- Message from Queenie Laguna sent at 1/18/2018  4:16 PM CST -----  Contact: NATHAN HUMPHREY [[id number]]  x_  1st Request  _  2nd Request  _  3rd Request        Who: NATHAN HUMPHREY [[id number]]    Why: Requesting a call back in regards to her being able to work tomorrow. Please call her.     What Number to Call Back:  [phone]    When to Expect a call back: (Within 24 hours)    Please return the call at earliest convenience. Thanks!

## 2018-01-18 NOTE — TELEPHONE ENCOUNTER
Pt was told per  this is not related to the pt's pregnancy and should follow her work schedule as plan

## 2018-01-31 ENCOUNTER — TELEPHONE (OUTPATIENT)
Dept: OBSTETRICS AND GYNECOLOGY | Facility: CLINIC | Age: 38
End: 2018-01-31

## 2018-01-31 ENCOUNTER — OFFICE VISIT (OUTPATIENT)
Dept: MATERNAL FETAL MEDICINE | Facility: CLINIC | Age: 38
End: 2018-01-31
Attending: OBSTETRICS & GYNECOLOGY
Payer: MEDICAID

## 2018-01-31 ENCOUNTER — HOSPITAL ENCOUNTER (EMERGENCY)
Facility: OTHER | Age: 38
Discharge: HOME | End: 2018-01-31
Attending: OBSTETRICS & GYNECOLOGY
Payer: MEDICAID

## 2018-01-31 VITALS
DIASTOLIC BLOOD PRESSURE: 70 MMHG | RESPIRATION RATE: 18 BRPM | SYSTOLIC BLOOD PRESSURE: 107 MMHG | OXYGEN SATURATION: 100 % | TEMPERATURE: 96 F | HEART RATE: 104 BPM

## 2018-01-31 VITALS — DIASTOLIC BLOOD PRESSURE: 80 MMHG | SYSTOLIC BLOOD PRESSURE: 140 MMHG

## 2018-01-31 DIAGNOSIS — O09.529 ANTEPARTUM MULTIGRAVIDA OF ADVANCED MATERNAL AGE: Primary | ICD-10-CM

## 2018-01-31 DIAGNOSIS — Z36.89 ENCOUNTER FOR ULTRASOUND TO CHECK FETAL GROWTH: ICD-10-CM

## 2018-01-31 DIAGNOSIS — R42 DIZZINESS: Primary | ICD-10-CM

## 2018-01-31 DIAGNOSIS — O10.919 CHRONIC HYPERTENSION AFFECTING PREGNANCY: ICD-10-CM

## 2018-01-31 DIAGNOSIS — Z3A.31 31 WEEKS GESTATION OF PREGNANCY: ICD-10-CM

## 2018-01-31 DIAGNOSIS — M79.602 LEFT ARM PAIN: ICD-10-CM

## 2018-01-31 DIAGNOSIS — O10.913 CHRONIC HYPERTENSION COMPLICATING OR REASON FOR CARE DURING PREGNANCY, THIRD TRIMESTER: ICD-10-CM

## 2018-01-31 DIAGNOSIS — G56.00 CARPAL TUNNEL SYNDROME, UNSPECIFIED LATERALITY: ICD-10-CM

## 2018-01-31 LAB
ALBUMIN SERPL BCP-MCNC: 2.8 G/DL
ALP SERPL-CCNC: 86 U/L
ALT SERPL W/O P-5'-P-CCNC: 10 U/L
ANION GAP SERPL CALC-SCNC: 6 MMOL/L
AST SERPL-CCNC: 17 U/L
BASOPHILS # BLD AUTO: 0.01 K/UL
BASOPHILS NFR BLD: 0.1 %
BILIRUB SERPL-MCNC: 0.4 MG/DL
BILIRUB SERPL-MCNC: NORMAL MG/DL
BLOOD URINE, POC: NORMAL
BUN SERPL-MCNC: 5 MG/DL
CALCIUM SERPL-MCNC: 8.8 MG/DL
CHLORIDE SERPL-SCNC: 106 MMOL/L
CO2 SERPL-SCNC: 23 MMOL/L
COLOR, POC UA: NORMAL
CREAT SERPL-MCNC: 0.5 MG/DL
CREAT UR-MCNC: 207.3 MG/DL
DIFFERENTIAL METHOD: ABNORMAL
EOSINOPHIL # BLD AUTO: 0 K/UL
EOSINOPHIL NFR BLD: 0.4 %
ERYTHROCYTE [DISTWIDTH] IN BLOOD BY AUTOMATED COUNT: 18.2 %
EST. GFR  (AFRICAN AMERICAN): >60 ML/MIN/1.73 M^2
EST. GFR  (NON AFRICAN AMERICAN): >60 ML/MIN/1.73 M^2
GLUCOSE SERPL-MCNC: 94 MG/DL
GLUCOSE UR QL STRIP: NORMAL
HCT VFR BLD AUTO: 24.3 %
HGB BLD-MCNC: 7.1 G/DL
KETONES UR QL STRIP: 1
LEUKOCYTE ESTERASE URINE, POC: NORMAL
LYMPHOCYTES # BLD AUTO: 1.4 K/UL
LYMPHOCYTES NFR BLD: 18.3 %
MCH RBC QN AUTO: 22.6 PG
MCHC RBC AUTO-ENTMCNC: 29.2 G/DL
MCV RBC AUTO: 77 FL
MONOCYTES # BLD AUTO: 0.6 K/UL
MONOCYTES NFR BLD: 7.6 %
NEUTROPHILS # BLD AUTO: 5.5 K/UL
NEUTROPHILS NFR BLD: 73.2 %
NITRITE, POC UA: NORMAL
PH, POC UA: 6
PLATELET # BLD AUTO: 289 K/UL
PMV BLD AUTO: 8.8 FL
POTASSIUM SERPL-SCNC: 3.9 MMOL/L
PROT SERPL-MCNC: 6.4 G/DL
PROT UR-MCNC: 26 MG/DL
PROT/CREAT RATIO, UR: 0.13
PROTEIN, POC: NORMAL
RBC # BLD AUTO: 3.14 M/UL
SODIUM SERPL-SCNC: 135 MMOL/L
SPECIFIC GRAVITY, POC UA: 1.02
TROPONIN I SERPL DL<=0.01 NG/ML-MCNC: 0.01 NG/ML
UROBILINOGEN, POC UA: NORMAL
WBC # BLD AUTO: 7.48 K/UL

## 2018-01-31 PROCEDURE — 59025 FETAL NON-STRESS TEST: CPT | Mod: 26,,, | Performed by: OBSTETRICS & GYNECOLOGY

## 2018-01-31 PROCEDURE — 59025 FETAL NON-STRESS TEST: CPT | Mod: 59

## 2018-01-31 PROCEDURE — 84484 ASSAY OF TROPONIN QUANT: CPT

## 2018-01-31 PROCEDURE — 80053 COMPREHEN METABOLIC PANEL: CPT

## 2018-01-31 PROCEDURE — 93010 ELECTROCARDIOGRAM REPORT: CPT | Mod: ,,, | Performed by: INTERNAL MEDICINE

## 2018-01-31 PROCEDURE — 82570 ASSAY OF URINE CREATININE: CPT

## 2018-01-31 PROCEDURE — 85025 COMPLETE CBC W/AUTO DIFF WBC: CPT

## 2018-01-31 PROCEDURE — 99499 UNLISTED E&M SERVICE: CPT | Mod: S$PBB,,, | Performed by: OBSTETRICS & GYNECOLOGY

## 2018-01-31 PROCEDURE — 76819 FETAL BIOPHYS PROFIL W/O NST: CPT | Mod: 26,S$PBB,, | Performed by: OBSTETRICS & GYNECOLOGY

## 2018-01-31 PROCEDURE — 25000003 PHARM REV CODE 250: Performed by: STUDENT IN AN ORGANIZED HEALTH CARE EDUCATION/TRAINING PROGRAM

## 2018-01-31 PROCEDURE — 99284 EMERGENCY DEPT VISIT MOD MDM: CPT | Mod: 25,27

## 2018-01-31 PROCEDURE — 96360 HYDRATION IV INFUSION INIT: CPT | Mod: 59

## 2018-01-31 PROCEDURE — 99211 OFF/OP EST MAY X REQ PHY/QHP: CPT | Mod: PBBFAC,TH,25 | Performed by: OBSTETRICS & GYNECOLOGY

## 2018-01-31 PROCEDURE — 81002 URINALYSIS NONAUTO W/O SCOPE: CPT

## 2018-01-31 PROCEDURE — 93005 ELECTROCARDIOGRAM TRACING: CPT | Mod: 59

## 2018-01-31 PROCEDURE — 99999 PR PBB SHADOW E&M-EST. PATIENT-LVL I: CPT | Mod: PBBFAC,,, | Performed by: OBSTETRICS & GYNECOLOGY

## 2018-01-31 PROCEDURE — 99283 EMERGENCY DEPT VISIT LOW MDM: CPT | Mod: 25,,, | Performed by: OBSTETRICS & GYNECOLOGY

## 2018-01-31 PROCEDURE — 76816 OB US FOLLOW-UP PER FETUS: CPT | Mod: 26,S$PBB,, | Performed by: OBSTETRICS & GYNECOLOGY

## 2018-01-31 PROCEDURE — 76819 FETAL BIOPHYS PROFIL W/O NST: CPT | Mod: PBBFAC | Performed by: OBSTETRICS & GYNECOLOGY

## 2018-01-31 PROCEDURE — 76816 OB US FOLLOW-UP PER FETUS: CPT | Mod: PBBFAC | Performed by: OBSTETRICS & GYNECOLOGY

## 2018-01-31 PROCEDURE — 96361 HYDRATE IV INFUSION ADD-ON: CPT | Mod: 59

## 2018-01-31 RX ORDER — ONDANSETRON 4 MG/1
4 TABLET, FILM COATED ORAL ONCE
Status: COMPLETED | OUTPATIENT
Start: 2018-01-31 | End: 2018-01-31

## 2018-01-31 RX ADMIN — SODIUM CHLORIDE, SODIUM LACTATE, POTASSIUM CHLORIDE, AND CALCIUM CHLORIDE 1000 ML: 600; 310; 30; 20 INJECTION, SOLUTION INTRAVENOUS at 11:01

## 2018-01-31 RX ADMIN — ONDANSETRON 4 MG: 4 TABLET, FILM COATED ORAL at 11:01

## 2018-01-31 NOTE — LETTER
January 31, 2018      Iliana Domínguez MD  [address] [facility] [address]           [facility]  [address]  [facility] [address]  Phone: [phone]          Patient: Emily Mckeon   MR Number: [id number]   YOB: 1980   Date of Visit: 1/31/2018       Dear Dr. Iliana Domínguez:    Thank you for referring Emily Mckeon to me for evaluation. Attached you will find relevant portions of my assessment and plan of care.    If you have questions, please do not hesitate to call me. I look forward to following Emily Mckeon along with you.    Sincerely,    Dany Paulino MD    Enclosure  CC:  No Recipients    If you would like to receive this communication electronically, please contact [email or web address] or [phone] to request more information on [facility] Link access.    For providers and/or their staff who would like to refer a patient to [facility], please contact us through our one-stop-shop provider referral line, [facility] , at [phone].    If you feel you have received this communication in error or would no longer like to receive these types of communications, please e-mail [email or web address]

## 2018-01-31 NOTE — PROGRESS NOTES
Pt reports to [facility] clinic for growth ultrasound due to hx of HTN. Pt reports she has been dizzy, has a headache, has numbness to right arm x1 week and is not feeling well. Reports BPs at home have been elevated. /80 currently. Message sent to Dr Domínguez and pt sent to OB ED after scan for evaluation. L&D triage notified.

## 2018-01-31 NOTE — ED NOTES
Pt arrived to [facility] with c/o numbness and tingling in R arm for the past week. Pt reports +FM, denies VB/LOF/ctx but reports pelvic pressure. Pt placed in triage bed. VS obtained and WNL. Pt placed on EFM.  MD notified and labs collected per orders. Will cont to monitor.

## 2018-01-31 NOTE — ED PROVIDER NOTES
"Encounter Date: 2018       History     Chief Complaint   Patient presents with    Hypertension     Emily Mckeon is a 37 y.o. [id number] at 31w0d  presents to the OB ED from Dr Paulino's [facility] clinic due to elevated BP - 140/80. She is also complaining of numbness, tingling, and loss of sensation of the RUE for the past 5 days. Patient also reports positional dizziness over the last two days. While reclined during evaluation reports no dizziness however if she sits up she gets "dizzy 10/10". She also reports history of carpal tunnel syndrome in her right hand with "tingling, pins and needles" in her right hand up to her finger tips. Now having numbness up entire right hand. Patient has history of migraines (negative MRI ) but currently reports no headache, N/V, SOB, CP. The pts current BPs are currently running 120s/70s.    This pregnancy is complicated by CHTN, chronic migraines, hx of c/s x3, obesity.     Patient denies contractions, denies vaginal bleeding, denies LOF.   Fetal Movement: normal.           Review of patient's allergies indicates:   Allergen Reactions    Demerol [meperidine] Hives    Morphine Hives    Pcn [penicillins]      Past Medical History:   Diagnosis Date    Chronic back pain     Elevated cholesterol     Hypertension     Impaired glucose in pregnancy, antepartum     Migraine      Past Surgical History:   Procedure Laterality Date     SECTION, CLASSIC      x3; all at term    LIPOSUCTION W/ FAT INJECTION       Family History   Problem Relation Age of Onset    Breast cancer Neg Hx     Colon cancer Neg Hx     Ovarian cancer Neg Hx      Social History   Substance Use Topics    Smoking status: Never Smoker    Smokeless tobacco: Never Used    Alcohol use No     Review of Systems   Constitutional: Negative for chills, fatigue and fever.   HENT: Negative for congestion.    Eyes: Negative for visual disturbance.   Respiratory: Negative for cough and shortness of breath.  "   Cardiovascular: Negative for chest pain and palpitations.   Gastrointestinal: Negative for abdominal distention, abdominal pain, constipation, diarrhea, nausea and vomiting.   Genitourinary: Negative for difficulty urinating, dysuria, hematuria, vaginal bleeding and vaginal discharge.   Skin: Negative for rash.   Neurological: Positive for dizziness. Negative for seizures, light-headedness and headaches.        Right hand tingling/numbness up to shoulder   Hematological: Does not bruise/bleed easily.   Psychiatric/Behavioral: Negative for dysphoric mood. The patient is not nervous/anxious.        Physical Exam     Initial Vitals [01/31/18 1020]   BP Pulse Resp Temp SpO2   123/77 103 18 96.3 °F (35.7 °C) 100 %      MAP       92.33       Temp:  [96.3 °F (35.7 °C)] 96.3 °F (35.7 °C)  Pulse:  [] 104  Resp:  [18] 18  SpO2:  [100 %] 100 %  BP: ()/(67-80) 107/70    Physical Exam    Constitutional: She appears well-developed and well-nourished. No distress.   HENT:   Head: Normocephalic and atraumatic.   Cardiovascular: Normal rate and regular rhythm.   Pulmonary/Chest: No respiratory distress.   Neurological: She is alert and oriented to person, place, and time. She has normal strength and normal reflexes. No sensory deficit.   Psychiatric: She has a normal mood and affect.     OB LABOR EXAM:   Pre-Term Labor: No.   Membranes ruptured: No.                   Comments: FHT: reassuring, 120bpm, mod BTB variability, pos accels, no decels  TOCO: irritable       ED Course   Procedures  Labs Reviewed   COMPREHENSIVE METABOLIC PANEL - Abnormal; Notable for the following:        Result Value    Sodium 135 (*)     BUN, Bld 5 (*)     Albumin 2.8 (*)     Anion Gap 6 (*)     All other components within normal limits   CBC W/ AUTO DIFFERENTIAL - Abnormal; Notable for the following:     RBC 3.14 (*)     Hemoglobin 7.1 (*)     Hematocrit 24.3 (*)     MCV 77 (*)     MCH 22.6 (*)     MCHC 29.2 (*)     RDW 18.2 (*)     MPV 8.8  (*)     Gran% 73.2 (*)     All other components within normal limits   PROTEIN / CREATININE RATIO, URINE - Abnormal; Notable for the following:     Protein, Urine Random 26 (*)     All other components within normal limits   TROPONIN I   POCT URINALYSIS, DIPSTICK OR TABLET REAGENT, AUTOMATED, WITH MICROSCOP             Medical Decision Making:   ED Management:  - NST - reactive  - Pre E labs wnl  - EKG - sinus tachy  - Troponin - normal  - IVF  - Patient has close follow up  - Dizziness:   Patient's dizziness improved with Calvin-Hallpike maneuver. Patient mostly dizzy standing up. Educated on role of beta blocker and need for waiting for hemostasis after standing before moving.   - Carpal Tunnel: Counseled patient on proper use of wrist band she already has. Also advised to speak with employer regarding ergonomic instruments including key board, appropriate chair height, etc.                Attending Attestation:   Physician Attestation Statement for Resident:  As the supervising MD   Physician Attestation Statement: I have personally seen and examined this patient.   I agree with the above history. -:   As the supervising MD I agree with the above PE.    As the supervising MD I agree with the above treatment, course, plan, and disposition.   -:   NST  I independently reviewed the fetal non-stress test with the following interpretation:  120 BPM baseline  Variability: moderate  Accelerations: present  Decelerations: absent  Contractions: none  Category 1    Clinical Interpretation:reactive    Patient evaluated and found to be stable, agree with resident's assessment of chronic HTN and carpal tunnel syndrome without evidence of stroke or eclampsia and plan to modify work station, wear wrist guard and continue antihypertensive. Risk of superimposed pre-eclampsia discussed.  I was personally present during the critical portions of the procedure(s) performed by the resident and was immediately available in the ED to  provide services and assistance as needed during the entire procedure.  I have reviewed and agree with the residents interpretation of the following: lab data.  I have reviewed the following: old records at this facility.                    ED Course      Clinical Impression:   The primary encounter diagnosis was Dizziness. Diagnoses of Left arm pain and Carpal tunnel syndrome, unspecified laterality were also pertinent to this visit.                           Elton Mcdonnell MD  Resident  01/31/18 8144       Valentine Gamez MD  01/31/18 8271

## 2018-01-31 NOTE — TELEPHONE ENCOUNTER
Called pt and was going to inform her to go to the OB ED. Pt is already there. Pt verbalized understanding.

## 2018-01-31 NOTE — TELEPHONE ENCOUNTER
----- Message from Talia Hernandez sent at 1/31/2018  9:51 AM CST -----  Contact: pt  X_  1st Request  _  2nd Request  _  3rd Request    ---FST Request---    Who:NATHAN HUMPHREY [[id number]]    Why: Patient 31 weeks OB states she is experiencing pelvic pain, dizziness, an d numbness in right arm patient would like to be seen today... Please contact to further discuss and advise     What Number to Call Back: [phone]    When to Expect a call back: (Before the end of the day)   -- if call after 3:00 call back will be tomorrow.

## 2018-02-06 ENCOUNTER — RESEARCH ENCOUNTER (OUTPATIENT)
Dept: RESEARCH | Facility: HOSPITAL | Age: 38
End: 2018-02-06

## 2018-02-06 ENCOUNTER — HOSPITAL ENCOUNTER (OUTPATIENT)
Dept: PERINATAL CARE | Facility: OTHER | Age: 38
Discharge: HOME | End: 2018-02-06
Attending: OBSTETRICS & GYNECOLOGY
Payer: MEDICAID

## 2018-02-06 ENCOUNTER — ROUTINE PRENATAL (OUTPATIENT)
Dept: OBSTETRICS AND GYNECOLOGY | Facility: CLINIC | Age: 38
End: 2018-02-06
Payer: MEDICAID

## 2018-02-06 ENCOUNTER — CLINICAL SUPPORT (OUTPATIENT)
Dept: OBSTETRICS AND GYNECOLOGY | Facility: CLINIC | Age: 38
End: 2018-02-06
Payer: MEDICAID

## 2018-02-06 VITALS — WEIGHT: 177 LBS | DIASTOLIC BLOOD PRESSURE: 80 MMHG | BODY MASS INDEX: 33.45 KG/M2 | SYSTOLIC BLOOD PRESSURE: 130 MMHG

## 2018-02-06 DIAGNOSIS — O10.919 CHRONIC HYPERTENSION AFFECTING PREGNANCY: ICD-10-CM

## 2018-02-06 DIAGNOSIS — Z98.891 H/O CESAREAN SECTION: ICD-10-CM

## 2018-02-06 DIAGNOSIS — O09.92 HIGH-RISK PREGNANCY IN SECOND TRIMESTER: Primary | ICD-10-CM

## 2018-02-06 DIAGNOSIS — N89.8 VAGINAL DISCHARGE: ICD-10-CM

## 2018-02-06 DIAGNOSIS — O09.92 HIGH-RISK PREGNANCY IN SECOND TRIMESTER: ICD-10-CM

## 2018-02-06 PROCEDURE — 99212 OFFICE O/P EST SF 10 MIN: CPT | Mod: PBBFAC,27,25

## 2018-02-06 PROCEDURE — 59025 FETAL NON-STRESS TEST: CPT | Mod: 26,,, | Performed by: OBSTETRICS & GYNECOLOGY

## 2018-02-06 PROCEDURE — 76815 OB US LIMITED FETUS(S): CPT | Mod: 26,,, | Performed by: OBSTETRICS & GYNECOLOGY

## 2018-02-06 PROCEDURE — 76815 OB US LIMITED FETUS(S): CPT

## 2018-02-06 PROCEDURE — 3008F BODY MASS INDEX DOCD: CPT | Mod: ,,, | Performed by: OBSTETRICS & GYNECOLOGY

## 2018-02-06 PROCEDURE — 99213 OFFICE O/P EST LOW 20 MIN: CPT | Mod: TH,S$PBB,25, | Performed by: OBSTETRICS & GYNECOLOGY

## 2018-02-06 PROCEDURE — 87480 CANDIDA DNA DIR PROBE: CPT

## 2018-02-06 PROCEDURE — 99212 OFFICE O/P EST SF 10 MIN: CPT | Mod: PBBFAC,TH,25 | Performed by: OBSTETRICS & GYNECOLOGY

## 2018-02-06 PROCEDURE — 99999 PR PBB SHADOW E&M-EST. PATIENT-LVL II: CPT | Mod: PBBFAC,,,

## 2018-02-06 PROCEDURE — 99999 PR PBB SHADOW E&M-EST. PATIENT-LVL II: CPT | Mod: PBBFAC,,, | Performed by: OBSTETRICS & GYNECOLOGY

## 2018-02-06 PROCEDURE — 59025 FETAL NON-STRESS TEST: CPT

## 2018-02-06 PROCEDURE — 90471 IMMUNIZATION ADMIN: CPT | Mod: PBBFAC

## 2018-02-06 NOTE — PROGRESS NOTES
[id number].C [facility] routine visit    I met with Ms. Mckeon in the Ob clinic this morning. Her clinic BP was 130/80, weight measured 80.3kg and urine dipstick showed a trace of protein. She reported numbness in her right arm; she stated that she had been to the ED for this. She denied taking any new medications of any other unplanned visits.She had no questions. Pulse meadured 108.

## 2018-02-06 NOTE — PROGRESS NOTES
NST reactive, moderate variability  Amniotic fluid volume wnl     Please see ultrasound report for details of fetal surveillance in prenatal testing center

## 2018-02-06 NOTE — PROGRESS NOTES
Patient reports feeling dizzy occasionally.  Drinks 7-8 glasses of water during the day.  Advised to sit down if feeling dizziness.  Keep hydrated.  Avoid low blood sugar.  No headaches. No spots in vision.  No RUQ pain.  No chest pain.    Does also have carpal tunnel.  Recommended wrist splint.  What she has at home is not the right kind.  I showed her a picture on [employer] of what she should be wearing to help with CT.    Good fetal movement.  No contractions, no vaginal bleeding, and no loss of fluid.  Does have some discharge.  No odor or burning.  Does have itching.  Vaginosis screen today.  Prenatal testing to start today.   Patient is still anemic.  Is taking iron twice per day, not with calcium or milk.  Takes it with juice.  Will recheck CBC again in one month.

## 2018-02-07 ENCOUNTER — PATIENT MESSAGE (OUTPATIENT)
Dept: OBSTETRICS AND GYNECOLOGY | Facility: CLINIC | Age: 38
End: 2018-02-07

## 2018-02-07 DIAGNOSIS — N76.0 BV (BACTERIAL VAGINOSIS): Primary | ICD-10-CM

## 2018-02-07 DIAGNOSIS — B96.89 BV (BACTERIAL VAGINOSIS): Primary | ICD-10-CM

## 2018-02-07 DIAGNOSIS — B37.9 YEAST INFECTION: ICD-10-CM

## 2018-02-07 LAB
CANDIDA RRNA VAG QL PROBE: POSITIVE
G VAGINALIS RRNA GENITAL QL PROBE: POSITIVE
T VAGINALIS RRNA GENITAL QL PROBE: NEGATIVE

## 2018-02-07 RX ORDER — METRONIDAZOLE 500 MG/1
500 TABLET ORAL EVERY 12 HOURS
Qty: 14 TABLET | Refills: 0 | Status: SHIPPED | OUTPATIENT
Start: 2018-02-07 | End: 2018-02-14

## 2018-02-07 RX ORDER — FLUCONAZOLE 150 MG/1
150 TABLET ORAL ONCE
Qty: 1 TABLET | Refills: 0 | Status: SHIPPED | OUTPATIENT
Start: 2018-02-07 | End: 2018-02-07

## 2018-02-14 ENCOUNTER — HOSPITAL ENCOUNTER (EMERGENCY)
Facility: OTHER | Age: 38
Discharge: HOME | End: 2018-02-14
Attending: OBSTETRICS & GYNECOLOGY
Payer: MEDICAID

## 2018-02-14 VITALS
TEMPERATURE: 97 F | SYSTOLIC BLOOD PRESSURE: 120 MMHG | DIASTOLIC BLOOD PRESSURE: 69 MMHG | HEART RATE: 105 BPM | OXYGEN SATURATION: 100 %

## 2018-02-14 DIAGNOSIS — Z98.891 H/O CESAREAN SECTION: ICD-10-CM

## 2018-02-14 DIAGNOSIS — Z3A.33 33 WEEKS GESTATION OF PREGNANCY: ICD-10-CM

## 2018-02-14 DIAGNOSIS — O34.13 LEIOMYOMA OF UTERUS AFFECTING PREGNANCY IN THIRD TRIMESTER: ICD-10-CM

## 2018-02-14 DIAGNOSIS — O10.913 CHRONIC HYPERTENSION COMPLICATING OR REASON FOR CARE DURING PREGNANCY, THIRD TRIMESTER: ICD-10-CM

## 2018-02-14 DIAGNOSIS — D25.9 LEIOMYOMA OF UTERUS AFFECTING PREGNANCY IN THIRD TRIMESTER: ICD-10-CM

## 2018-02-14 DIAGNOSIS — O47.00 PRETERM CONTRACTIONS: Primary | ICD-10-CM

## 2018-02-14 PROCEDURE — 25000003 PHARM REV CODE 250: Performed by: STUDENT IN AN ORGANIZED HEALTH CARE EDUCATION/TRAINING PROGRAM

## 2018-02-14 PROCEDURE — 59025 FETAL NON-STRESS TEST: CPT | Mod: 26,,, | Performed by: OBSTETRICS & GYNECOLOGY

## 2018-02-14 PROCEDURE — 99284 EMERGENCY DEPT VISIT MOD MDM: CPT | Mod: 25,,, | Performed by: OBSTETRICS & GYNECOLOGY

## 2018-02-14 PROCEDURE — 59025 FETAL NON-STRESS TEST: CPT

## 2018-02-14 PROCEDURE — 99284 EMERGENCY DEPT VISIT MOD MDM: CPT | Mod: 25

## 2018-02-14 RX ORDER — ACETAMINOPHEN 500 MG
1000 TABLET ORAL ONCE
Status: COMPLETED | OUTPATIENT
Start: 2018-02-14 | End: 2018-02-14

## 2018-02-14 RX ADMIN — ACETAMINOPHEN 1000 MG: 500 TABLET ORAL at 11:02

## 2018-02-14 NOTE — ED PROVIDER NOTES
Encounter Date: 2018       History     Chief Complaint   Patient presents with    Contractions     Emily Mckeon is a 37 y.o. [id number] at 33w0d presents complaining of contractions lasting 5 mins long, happening every 6 mins since 11pm last night. Has not tried any medication for the contraction like pain. Feels similar to her last contraction like pain. Patient is part of the CHAP trial for CHTN, not currently on any meds. Denies any Cp, SOB, vision changes, or RUQ pain. States she has a slight headache.    This IUP is complicated CHTN (on no meds), chronic migraines, hx of c/s x3, obesity. Patient reports contractions, denies vaginal bleeding, denies LOF.   Fetal Movement: normal.            Review of patient's allergies indicates:   Allergen Reactions    Demerol [meperidine] Hives    Morphine Hives    Pcn [penicillins]      Past Medical History:   Diagnosis Date    Chronic back pain     Elevated cholesterol     Hypertension     Impaired glucose in pregnancy, antepartum     Migraine      Past Surgical History:   Procedure Laterality Date     SECTION, CLASSIC      x3; all at term    LIPOSUCTION W/ FAT INJECTION       Family History   Problem Relation Age of Onset    Breast cancer Neg Hx     Colon cancer Neg Hx     Ovarian cancer Neg Hx      Social History   Substance Use Topics    Smoking status: Never Smoker    Smokeless tobacco: Never Used    Alcohol use No     Review of Systems   Constitutional: Negative for chills, fatigue and fever.   HENT: Negative for congestion.    Eyes: Negative for visual disturbance.   Respiratory: Negative for cough and shortness of breath.    Cardiovascular: Negative for chest pain and palpitations.   Gastrointestinal: Positive for abdominal pain (contractions). Negative for abdominal distention, constipation, diarrhea, nausea and vomiting.   Genitourinary: Negative for difficulty urinating, dysuria, hematuria, vaginal bleeding and vaginal discharge.   Skin:  Negative for rash.   Neurological: Negative for dizziness, seizures, light-headedness and headaches.   Hematological: Does not bruise/bleed easily.   Psychiatric/Behavioral: Negative for dysphoric mood. The patient is not nervous/anxious.    All other systems reviewed and are negative.      Physical Exam     Initial Vitals   BP Pulse Resp Temp SpO2   -- 120/69 -- 100 -- 97 --99      MAP       --       Temp:  [97 °F (36.1 °C)] 97 °F (36.1 °C)  Pulse:  [] 105  SpO2:  [99 %-100 %] 100 %  BP: (120)/(69) 120/69    Physical Exam    Vitals reviewed.  Constitutional: She appears well-developed and well-nourished.   Cardiovascular: Normal rate and regular rhythm.   Pulmonary/Chest: Breath sounds normal. No respiratory distress.   Abdominal: Soft. She exhibits no distension. There is no tenderness.   Musculoskeletal: She exhibits no edema.   Neurological: She is alert and oriented to person, place, and time.   Skin: Skin is warm and dry.   Psychiatric: She has a normal mood and affect.     OB LABOR EXAM:   Pre-Term Labor: No.   Membranes ruptured: No.   Method: Sterile vaginal exam per MD and Sterile speculum exam per MD.   Vaginal Bleeding: none present.     Dilatation: 0.   Station: -3.   Effacement: 50%.   Amniotic Fluid Color: no fluid.           ED Course   Obtain Fetal nonstress test (NST)  Date/Time: 2/14/2018 1:31 PM  Performed by: SONIA GIORDANO  Authorized by: SONIA GIORDANO     Nonstress Test:     Variability:  6-25 BPM    Decelerations:  None    Accelerations:  15 bpm    Acoustic Stimulator: No      Baseline:  150    Uterine Irritability: Yes      Contractions:  Irregular    Contraction Frequency:  Every 1-2 mins  Biophysical Profile:     Nonstress Test Interpretation: reactive      Overall Impression:  Reassuring      Labs Reviewed - No data to display          Medical Decision Making:   ED Management:  - NST showed irregular ctx every 1-2 mins with uterine irritability. FHTs reassuring  - cervix closed  on exam  - Udip showed large ketones, patient admits to not drinking a lot of    - 1L fluid bolus given and large pitcher of water  - Cervix closed on repeat exam 2 hours later  - Contractions spaced out and uterine irritability resolved s/p tylenol and fluid bolus  - Patient reports contractions still present but less painful  - Will discharge home with ED and labor precautions  - instructed patient to increase fluid intake to 8-9 bottles of water a day  - Patient verbalized understanding              Attending Attestation:   Physician Attestation Statement for Resident:  As the supervising MD   Physician Attestation Statement: I have personally seen and examined this patient.   I agree with the above history. -:   As the supervising MD I agree with the above PE.    As the supervising MD I agree with the above treatment, course, plan, and disposition.   -:   NST  I independently reviewed the fetal non-stress test with the following interpretation:  150 BPM baseline  Variability: moderate  Accelerations: present  Decelerations: absent  Contractions: Q 1-2 min initially, then spaced to Q 10 min post hydration  Category 1    Clinical Interpretation:reactive    Patient evaluated and found to be stable, agree with resident's assessment of  contractions with no evidence of  labor or placental abruption and plan to discharge to home with  labor precautions.  I was personally present during the critical portions of the procedure(s) performed by the resident and was immediately available in the ED to provide services and assistance as needed during the entire procedure.  I have reviewed and agree with the residents interpretation of the following: lab data.  I have reviewed the following: old records at this facility.                    ED Course      Clinical Impression:   The encounter diagnosis was  contractions.                           Maryam Murillo MD  Resident  [phone]        Valentine Gamez MD  02/14/18 [id number]

## 2018-02-15 ENCOUNTER — HOSPITAL ENCOUNTER (OUTPATIENT)
Dept: PERINATAL CARE | Facility: OTHER | Age: 38
Discharge: HOME | End: 2018-02-15
Attending: OBSTETRICS & GYNECOLOGY
Payer: MEDICAID

## 2018-02-15 DIAGNOSIS — O09.92 HIGH-RISK PREGNANCY IN SECOND TRIMESTER: ICD-10-CM

## 2018-02-15 DIAGNOSIS — O10.919 CHRONIC HYPERTENSION AFFECTING PREGNANCY: ICD-10-CM

## 2018-02-15 PROCEDURE — 99499 UNLISTED E&M SERVICE: CPT | Mod: ,,, | Performed by: PEDIATRICS

## 2018-02-15 PROCEDURE — 76818 FETAL BIOPHYS PROFILE W/NST: CPT | Mod: 26,,, | Performed by: PEDIATRICS

## 2018-02-15 PROCEDURE — 76818 FETAL BIOPHYS PROFILE W/NST: CPT

## 2018-02-15 NOTE — PROGRESS NOTES
Indication  ========    NST, BPP.    History  ======    General History  Other: CHTN: [facility] TRIAL Participant -randomized to standard of care/no medication  Previous Outcomes  Preg. no. 1  Outcome: Live YOB: 2001  Gest. age 40 w + 0 d  Gender: male  Details:  6lbs  Preg. no. 2  Outcome: Live YOB: 2002  Gest. age 39 w + 0 d  Gender: female  Details:  6lbs  Preg. no. 3  Outcome: Live YOB: 2003  Gest. age 39 w + 0 d  Gender: male  Details:  6lbs   4  Para 3  Mann children born living (T) 3  Mann children born (T) 3  Mann living children (L) 3  Risk Factors  History risk factors: AMA  History risk factors: Chronic Hypertension  Details: fibroids    Maternal Assessment  =================    BP syst 125 mmHg  BP diast 80 mmHg    Method  ======    Transabdominal ultrasound examination, Voluson S8. View: Sufficient.    Pregnancy  =========    Mann pregnancy. Number of fetuses: 1.    Dating  ======    Cycle: regular cycle  Assigned: Dating performed on 2017, based on ultrasound (CRL)  Assigned GA 33 w + 1 d  Assigned RICHARD: 2018    General Evaluation  ==============    Cardiac activity: present.  bpm.  Fetal movements: visualized.  Presentation: cephalic.  Placenta: anterior.  Umbilical cord: 3 vessel cord.    Non Stress Test  =============    NST interpretation: non-reactive. Test duration 32 min. Baseline  bpm. Baseline variability: moderate. Accelerations: present, 10x10.  Decelerations: absent. Uterine activity: present, irritability. Acoustic stimulation: yes. Number of stimulations: 1. Stimulation response: no  acceleration  denies painful ctx,lof,vag bleeding,affirms fetal movements.    Amniotic Fluid Assessment  =====================    Amount of AF: normal amount  MVP 4.4 cm    Biophysical Profile  ==============    2: Fetal breathing movements  2: Gross body movements  2: Fetal tone  2: Amniotic fluid  volume  0: NST  8/10: Biophysical profile score  Interpretation: normal    Impression  =========    reviewed strip with Dr Beckman    BPP done. 8 of 10 (NR but reassuring strip).    Recommendation  ==============    Continue fetal surveillance as previously outlined.

## 2018-02-16 ENCOUNTER — HOSPITAL ENCOUNTER (EMERGENCY)
Facility: HOSPITAL | Age: 38
Discharge: HOME | End: 2018-02-16
Attending: EMERGENCY MEDICINE
Payer: MEDICAID

## 2018-02-16 VITALS
HEIGHT: 61 IN | OXYGEN SATURATION: 100 % | HEART RATE: 97 BPM | RESPIRATION RATE: 18 BRPM | WEIGHT: 165 LBS | TEMPERATURE: 98 F | BODY MASS INDEX: 31.15 KG/M2 | SYSTOLIC BLOOD PRESSURE: 125 MMHG | DIASTOLIC BLOOD PRESSURE: 72 MMHG

## 2018-02-16 DIAGNOSIS — Z3A.32 32 WEEKS GESTATION OF PREGNANCY: ICD-10-CM

## 2018-02-16 DIAGNOSIS — M65.4 TENDINITIS, DE QUERVAIN'S: Primary | ICD-10-CM

## 2018-02-16 PROCEDURE — 99283 EMERGENCY DEPT VISIT LOW MDM: CPT | Mod: ,,, | Performed by: EMERGENCY MEDICINE

## 2018-02-16 PROCEDURE — 99283 EMERGENCY DEPT VISIT LOW MDM: CPT

## 2018-02-16 NOTE — ED NOTES
LOC: The patient is awake, alert and aware of environment with an appropriate affect, the patient is oriented x 3 and speaking appropriately.  APPEARANCE: Patient resting comfortably and in no acute distress, patient is clean and well groomed, patient's clothing is properly fastened.  SKIN: The skin is warm and dry, color consistent with ethnicity, patient has normal skin turgor and moist mucus membranes, skin intact, no breakdown or bruising noted.  MUSCULOSKELETAL: Patient moving all extremities spontaneously, no obvious swelling or deformities noted.  + pain to left wrist on movement and palpation.  RESPIRATORY: Airway is open and patent, respirations are spontaneous, patient has a normal effort and rate, no accessory muscle use noted.

## 2018-02-16 NOTE — ED TRIAGE NOTES
Pt c/o left lateral wrist pain x 2 weeks, pt states pain worse the past couple of days.  Pt denies trauma.

## 2018-02-20 ENCOUNTER — RESEARCH ENCOUNTER (OUTPATIENT)
Dept: RESEARCH | Facility: HOSPITAL | Age: 38
End: 2018-02-20

## 2018-02-20 ENCOUNTER — HOSPITAL ENCOUNTER (OUTPATIENT)
Dept: PERINATAL CARE | Facility: OTHER | Age: 38
Discharge: HOME | End: 2018-02-20
Attending: OBSTETRICS & GYNECOLOGY
Payer: MEDICAID

## 2018-02-20 DIAGNOSIS — O10.919 CHRONIC HYPERTENSION AFFECTING PREGNANCY: ICD-10-CM

## 2018-02-20 DIAGNOSIS — O09.92 HIGH-RISK PREGNANCY IN SECOND TRIMESTER: ICD-10-CM

## 2018-02-20 PROCEDURE — 76818 FETAL BIOPHYS PROFILE W/NST: CPT | Mod: 26,,, | Performed by: OBSTETRICS & GYNECOLOGY

## 2018-02-20 PROCEDURE — 76818 FETAL BIOPHYS PROFILE W/NST: CPT

## 2018-02-20 NOTE — PROGRESS NOTES
Reassuring BPP 8/8  NST intermittent traced, appears reactive but inadequate  Normal amniotic fluid volume  See report in imaging tab

## 2018-02-20 NOTE — PROGRESS NOTES
CHAP Study Visit    Met Ms. Mckeon in PNT. Her BP was 123/76 and pulse of 91. She is randomized to routine care group but she is taking 25 mg metroprolol qd. Went to the ED for Tendinitis, de Boone's.

## 2018-02-23 ENCOUNTER — ROUTINE PRENATAL (OUTPATIENT)
Dept: OBSTETRICS AND GYNECOLOGY | Facility: CLINIC | Age: 38
End: 2018-02-23
Payer: MEDICAID

## 2018-02-23 VITALS — DIASTOLIC BLOOD PRESSURE: 80 MMHG | SYSTOLIC BLOOD PRESSURE: 130 MMHG | WEIGHT: 175 LBS | BODY MASS INDEX: 33.07 KG/M2

## 2018-02-23 DIAGNOSIS — O09.523 ELDERLY MULTIGRAVIDA IN THIRD TRIMESTER: ICD-10-CM

## 2018-02-23 DIAGNOSIS — O09.92 HIGH-RISK PREGNANCY IN SECOND TRIMESTER: Primary | ICD-10-CM

## 2018-02-23 DIAGNOSIS — D50.8 IRON DEFICIENCY ANEMIA SECONDARY TO INADEQUATE DIETARY IRON INTAKE: ICD-10-CM

## 2018-02-23 DIAGNOSIS — Z98.891 H/O CESAREAN SECTION: ICD-10-CM

## 2018-02-23 DIAGNOSIS — O10.919 CHRONIC HYPERTENSION AFFECTING PREGNANCY: ICD-10-CM

## 2018-02-23 PROCEDURE — 99213 OFFICE O/P EST LOW 20 MIN: CPT | Mod: TH,S$PBB,, | Performed by: OBSTETRICS & GYNECOLOGY

## 2018-02-23 PROCEDURE — 3008F BODY MASS INDEX DOCD: CPT | Mod: ,,, | Performed by: OBSTETRICS & GYNECOLOGY

## 2018-02-23 PROCEDURE — 99999 PR PBB SHADOW E&M-EST. PATIENT-LVL II: CPT | Mod: PBBFAC,,, | Performed by: OBSTETRICS & GYNECOLOGY

## 2018-02-23 PROCEDURE — 99212 OFFICE O/P EST SF 10 MIN: CPT | Mod: PBBFAC,TH | Performed by: OBSTETRICS & GYNECOLOGY

## 2018-02-23 NOTE — PROGRESS NOTES
Good fetal movement.  No contractions, no vaginal bleeding, and no loss of fluid.  Carpal tunnel is bothering her on the other wrist now.  Again reiterated wrist splints.   Discussed risks of CS today- hemorrhage, injury to neighboring organs- bladder/bowel requiring repair, death, ICU admission.  Per patient, her last CS was complicated by scar tissue- last CS 2003, records not available. Will schedule CS for 39 weeks with another staff.

## 2018-02-26 ENCOUNTER — PATIENT MESSAGE (OUTPATIENT)
Dept: OBSTETRICS AND GYNECOLOGY | Facility: CLINIC | Age: 38
End: 2018-02-26

## 2018-02-28 ENCOUNTER — OFFICE VISIT (OUTPATIENT)
Dept: MATERNAL FETAL MEDICINE | Facility: CLINIC | Age: 38
End: 2018-02-28
Attending: OBSTETRICS & GYNECOLOGY
Payer: MEDICAID

## 2018-02-28 DIAGNOSIS — O09.529 ANTEPARTUM MULTIGRAVIDA OF ADVANCED MATERNAL AGE: ICD-10-CM

## 2018-02-28 DIAGNOSIS — O10.919 CHRONIC HYPERTENSION AFFECTING PREGNANCY: ICD-10-CM

## 2018-02-28 DIAGNOSIS — Z36.89 ENCOUNTER FOR ULTRASOUND TO CHECK FETAL GROWTH: ICD-10-CM

## 2018-02-28 PROCEDURE — 76819 FETAL BIOPHYS PROFIL W/O NST: CPT | Mod: PBBFAC | Performed by: OBSTETRICS & GYNECOLOGY

## 2018-02-28 PROCEDURE — 76816 OB US FOLLOW-UP PER FETUS: CPT | Mod: PBBFAC | Performed by: OBSTETRICS & GYNECOLOGY

## 2018-02-28 PROCEDURE — 76819 FETAL BIOPHYS PROFIL W/O NST: CPT | Mod: 26,S$PBB,, | Performed by: OBSTETRICS & GYNECOLOGY

## 2018-02-28 PROCEDURE — 76816 OB US FOLLOW-UP PER FETUS: CPT | Mod: 26,S$PBB,, | Performed by: OBSTETRICS & GYNECOLOGY

## 2018-02-28 PROCEDURE — 99499 UNLISTED E&M SERVICE: CPT | Mod: S$PBB,,, | Performed by: OBSTETRICS & GYNECOLOGY

## 2018-02-28 NOTE — PROGRESS NOTES
OB Ultrasound Report (see PDF version under imaging tab)    Indication  ========    Follow-up evaluation for fetal growth.    History  ======    General History  Other: CHTN: [facility] TRIAL Participant -randomized to standard of care/no medication  Previous Outcomes  Preg. no. 1  Outcome: Live YOB: 2001  Gest. age 40 w + 0 d  Gender: male  Details:  6lbs  Preg. no. 2  Outcome: Live YOB: 2002  Gest. age 39 w + 0 d  Gender: female  Details:  6lbs  Preg. no. 3  Outcome: Live YOB: 2005  Gest. age 39 w + 0 d  Gender: male  Details:  6lbs   4  Para 3  Mann children born living (T) 3  Mann children born (T) 3  Mann living children (L) 3  Risk Factors  History risk factors: AMA  History risk factors: Chronic Hypertension  Details: fibroids    Pregnancy History  ===============    Maternal Lab Tests  Result: declined screening  Wants to know gender: yes    Method  ======    Transabdominal ultrasound examination, Voluson E10. View: Good view.    Pregnancy  =========    Mann pregnancy. Number of fetuses: 1.    Dating  ======    Cycle: regular cycle  Ultrasound examination on: 2018  GA by U/S based upon: AC, BPD, Femur, HC  GA by U/S 35 w + 4 d  RICHARD by U/S: 3/31/2018  Assigned: Dating performed on 2017, based on ultrasound (CRL)  Assigned GA 35 w + 0 d  Assigned RICHARD: 2018    General Evaluation  ===============    Cardiac activity: present.  bpm.  Fetal movements: visualized.  Presentation: cephalic.  Placenta:  Placental site: anterior.  Umbilical cord: Cord vessels: 3 vessel cord.  Amniotic fluid: MVP 5.3 cm.    Biophysical Profile  ===============    2: Fetal breathing movements  2: Gross body movements  2: Fetal tone  2: Amniotic fluid volume  : Biophysical profile score    Fetal Biometry  ===========    Fetal Biometry  BPD 87.6 mm 35w 3d Hadlock  .2 mm  .6 mm 36w 4d Hadlock  .1 mm 35w 0d  Hadlock  Femur 68.8 mm 35w 2d Hadlock  EFW 2,648 g 36% Dano  Calculated by: Hadlock (BPD-HC-AC-FL)  EFW (lb) 5 lb  EFW (oz) 13 oz  Cephalic index 0.76  HC / AC 1.04  FL / BPD 0.79  FL / AC 0.22  MVP 5.3 cm   bpm    Fetal Anatomy  ===========    Cranium: normal  4-chamber view: normal  Stomach: normal  Kidneys: normal  Bladder: normal  Wants to know gender: yes    Maternal Structures  ===============    Uterus / Cervix  Uterus: Abnormal  Fibroids: Fibroids identified  Findings: Intramural. Fundal RT  D1 38.7 mm  D2 34.6 mm  D3 37.9 mm  Mean 37.1 mm  Vol 26.583 cm³  Ovaries / Tubes / Adnexa  Rt ovary: Visualized  Lt ovary: Visualized      Impression  =========    Fetal size is AGA with the EFW at the 36th percentile.  Normal repeat limited fetal anatomic survey.  The BPP score is reassuring at 8/8, and the MVP is normal.  Follow-up ultrasound as clinically indicated.

## 2018-03-01 ENCOUNTER — ANESTHESIA (OUTPATIENT)
Dept: OBSTETRICS AND GYNECOLOGY | Facility: OTHER | Age: 38
End: 2018-03-01
Payer: MEDICAID

## 2018-03-01 ENCOUNTER — ANESTHESIA EVENT (OUTPATIENT)
Dept: OBSTETRICS AND GYNECOLOGY | Facility: OTHER | Age: 38
End: 2018-03-01
Payer: MEDICAID

## 2018-03-01 ENCOUNTER — HOSPITAL ENCOUNTER (INPATIENT)
Facility: OTHER | Age: 38
LOS: 3 days | Discharge: HOME | End: 2018-03-04
Attending: OBSTETRICS & GYNECOLOGY | Admitting: OBSTETRICS & GYNECOLOGY
Payer: MEDICAID

## 2018-03-01 DIAGNOSIS — O47.03 PRETERM UTERINE CONTRACTIONS IN THIRD TRIMESTER, ANTEPARTUM: Primary | ICD-10-CM

## 2018-03-01 DIAGNOSIS — Z3A.35 35 WEEKS GESTATION OF PREGNANCY: ICD-10-CM

## 2018-03-01 DIAGNOSIS — O47.00 PRETERM CONTRACTIONS: ICD-10-CM

## 2018-03-01 DIAGNOSIS — O34.219 PREVIOUS CESAREAN DELIVERY, ANTEPARTUM: ICD-10-CM

## 2018-03-01 DIAGNOSIS — E86.0 MILD DEHYDRATION: ICD-10-CM

## 2018-03-01 DIAGNOSIS — Z98.891 H/O CESAREAN SECTION: ICD-10-CM

## 2018-03-01 DIAGNOSIS — O10.913 CHRONIC HYPERTENSION IN OBSTETRIC CONTEXT IN THIRD TRIMESTER: ICD-10-CM

## 2018-03-01 LAB
ABO + RH BLD: NORMAL
ALBUMIN SERPL BCP-MCNC: 2.8 G/DL
ALLENS TEST: ABNORMAL
ALLENS TEST: ABNORMAL
ALP SERPL-CCNC: 114 U/L
ALT SERPL W/O P-5'-P-CCNC: 5 U/L
ANION GAP SERPL CALC-SCNC: 11 MMOL/L
AST SERPL-CCNC: 18 U/L
BASOPHILS # BLD AUTO: 0.01 K/UL
BASOPHILS NFR BLD: 0.1 %
BILIRUB SERPL-MCNC: 0.6 MG/DL
BLD GP AB SCN CELLS X3 SERPL QL: NORMAL
BUN SERPL-MCNC: 4 MG/DL
CALCIUM SERPL-MCNC: 9.1 MG/DL
CHLORIDE SERPL-SCNC: 104 MMOL/L
CO2 SERPL-SCNC: 18 MMOL/L
CREAT SERPL-MCNC: 0.6 MG/DL
CREAT UR-MCNC: 130.4 MG/DL
DIFFERENTIAL METHOD: ABNORMAL
EOSINOPHIL # BLD AUTO: 0 K/UL
EOSINOPHIL NFR BLD: 0.3 %
ERYTHROCYTE [DISTWIDTH] IN BLOOD BY AUTOMATED COUNT: 20.3 %
EST. GFR  (AFRICAN AMERICAN): >60 ML/MIN/1.73 M^2
EST. GFR  (NON AFRICAN AMERICAN): >60 ML/MIN/1.73 M^2
GLUCOSE SERPL-MCNC: 101 MG/DL
HCO3 UR-SCNC: 21.7 MMOL/L (ref 24–28)
HCO3 UR-SCNC: 27.8 MMOL/L (ref 24–28)
HCT VFR BLD AUTO: 25.6 %
HGB BLD-MCNC: 7.3 G/DL
LYMPHOCYTES # BLD AUTO: 2 K/UL
LYMPHOCYTES NFR BLD: 21.7 %
MCH RBC QN AUTO: 21.8 PG
MCHC RBC AUTO-ENTMCNC: 28.5 G/DL
MCV RBC AUTO: 76 FL
MONOCYTES # BLD AUTO: 0.9 K/UL
MONOCYTES NFR BLD: 10 %
NEUTROPHILS # BLD AUTO: 6.3 K/UL
NEUTROPHILS NFR BLD: 67.9 %
PCO2 BLDA: 34.6 MMHG (ref 35–45)
PCO2 BLDA: 40.4 MMHG (ref 35–45)
PH SMN: 7.34 [PH] (ref 7.35–7.45)
PH SMN: 7.51 [PH] (ref 7.35–7.45)
PLATELET # BLD AUTO: 280 K/UL
PMV BLD AUTO: 9.3 FL
PO2 BLDA: 18 MMHG (ref 80–100)
PO2 BLDA: 31 MMHG (ref 80–100)
POC BE: -4 MMOL/L
POC BE: 5 MMOL/L
POC SATURATED O2: 25 % (ref 95–100)
POC SATURATED O2: 66 % (ref 95–100)
POTASSIUM SERPL-SCNC: 3 MMOL/L
PROT SERPL-MCNC: 6.6 G/DL
PROT UR-MCNC: 24 MG/DL
PROT/CREAT RATIO, UR: 0.18
RBC # BLD AUTO: 3.35 M/UL
SAMPLE: ABNORMAL
SAMPLE: ABNORMAL
SITE: ABNORMAL
SITE: ABNORMAL
SODIUM SERPL-SCNC: 133 MMOL/L
WBC # BLD AUTO: 9.36 K/UL

## 2018-03-01 PROCEDURE — 25000003 PHARM REV CODE 250: Performed by: STUDENT IN AN ORGANIZED HEALTH CARE EDUCATION/TRAINING PROGRAM

## 2018-03-01 PROCEDURE — 59514 CESAREAN DELIVERY ONLY: CPT | Mod: AT,,, | Performed by: OBSTETRICS & GYNECOLOGY

## 2018-03-01 PROCEDURE — 25000003 PHARM REV CODE 250

## 2018-03-01 PROCEDURE — 37000008 HC ANESTHESIA 1ST 15 MINUTES: Performed by: OBSTETRICS & GYNECOLOGY

## 2018-03-01 PROCEDURE — 63600175 PHARM REV CODE 636 W HCPCS: Performed by: OBSTETRICS & GYNECOLOGY

## 2018-03-01 PROCEDURE — 96372 THER/PROPH/DIAG INJ SC/IM: CPT

## 2018-03-01 PROCEDURE — 99499 UNLISTED E&M SERVICE: CPT | Mod: ,,, | Performed by: OBSTETRICS & GYNECOLOGY

## 2018-03-01 PROCEDURE — 25000003 PHARM REV CODE 250: Performed by: OBSTETRICS & GYNECOLOGY

## 2018-03-01 PROCEDURE — 86901 BLOOD TYPING SEROLOGIC RH(D): CPT

## 2018-03-01 PROCEDURE — 51702 INSERT TEMP BLADDER CATH: CPT

## 2018-03-01 PROCEDURE — 99285 EMERGENCY DEPT VISIT HI MDM: CPT | Mod: 25

## 2018-03-01 PROCEDURE — 96374 THER/PROPH/DIAG INJ IV PUSH: CPT

## 2018-03-01 PROCEDURE — 99900035 HC TECH TIME PER 15 MIN (STAT)

## 2018-03-01 PROCEDURE — 84156 ASSAY OF PROTEIN URINE: CPT

## 2018-03-01 PROCEDURE — 80053 COMPREHEN METABOLIC PANEL: CPT

## 2018-03-01 PROCEDURE — 37000009 HC ANESTHESIA EA ADD 15 MINS: Performed by: OBSTETRICS & GYNECOLOGY

## 2018-03-01 PROCEDURE — 85025 COMPLETE CBC W/AUTO DIFF WBC: CPT

## 2018-03-01 PROCEDURE — 25000003 PHARM REV CODE 250: Performed by: ANESTHESIOLOGY

## 2018-03-01 PROCEDURE — 59025 FETAL NON-STRESS TEST: CPT | Mod: 26,,, | Performed by: OBSTETRICS & GYNECOLOGY

## 2018-03-01 PROCEDURE — 96361 HYDRATE IV INFUSION ADD-ON: CPT

## 2018-03-01 PROCEDURE — 59025 FETAL NON-STRESS TEST: CPT

## 2018-03-01 PROCEDURE — 82803 BLOOD GASES ANY COMBINATION: CPT

## 2018-03-01 PROCEDURE — 63600175 PHARM REV CODE 636 W HCPCS: Performed by: STUDENT IN AN ORGANIZED HEALTH CARE EDUCATION/TRAINING PROGRAM

## 2018-03-01 PROCEDURE — 59514 CESAREAN DELIVERY ONLY: CPT | Mod: ,,, | Performed by: ANESTHESIOLOGY

## 2018-03-01 PROCEDURE — 11000001 HC ACUTE MED/SURG PRIVATE ROOM

## 2018-03-01 PROCEDURE — 88307 TISSUE EXAM BY PATHOLOGIST: CPT | Performed by: PATHOLOGY

## 2018-03-01 PROCEDURE — S0077 INJECTION, CLINDAMYCIN PHOSP: HCPCS | Performed by: STUDENT IN AN ORGANIZED HEALTH CARE EDUCATION/TRAINING PROGRAM

## 2018-03-01 PROCEDURE — 86920 COMPATIBILITY TEST SPIN: CPT

## 2018-03-01 PROCEDURE — 99285 EMERGENCY DEPT VISIT HI MDM: CPT | Mod: 25,,, | Performed by: OBSTETRICS & GYNECOLOGY

## 2018-03-01 PROCEDURE — S0028 INJECTION, FAMOTIDINE, 20 MG: HCPCS

## 2018-03-01 PROCEDURE — 36000684 HC CESAREAN SECTION, UNSCHEDULED

## 2018-03-01 PROCEDURE — 63600175 PHARM REV CODE 636 W HCPCS: Performed by: ANESTHESIOLOGY

## 2018-03-01 PROCEDURE — 88307 TISSUE EXAM BY PATHOLOGIST: CPT | Mod: 26,,, | Performed by: PATHOLOGY

## 2018-03-01 RX ORDER — ONDANSETRON 2 MG/ML
4 INJECTION INTRAMUSCULAR; INTRAVENOUS EVERY 6 HOURS PRN
Status: ACTIVE | OUTPATIENT
Start: 2018-03-01 | End: 2018-03-02

## 2018-03-01 RX ORDER — DIPHENHYDRAMINE HCL 25 MG
25 CAPSULE ORAL EVERY 4 HOURS PRN
Status: DISCONTINUED | OUTPATIENT
Start: 2018-03-01 | End: 2018-03-01

## 2018-03-01 RX ORDER — OXYCODONE AND ACETAMINOPHEN 10; 325 MG/1; MG/1
1 TABLET ORAL EVERY 4 HOURS PRN
Status: DISCONTINUED | OUTPATIENT
Start: 2018-03-02 | End: 2018-03-04 | Stop reason: HOSPADM

## 2018-03-01 RX ORDER — ONDANSETRON 8 MG/1
8 TABLET, ORALLY DISINTEGRATING ORAL EVERY 8 HOURS PRN
Status: DISCONTINUED | OUTPATIENT
Start: 2018-03-01 | End: 2018-03-01

## 2018-03-01 RX ORDER — PHENYLEPHRINE HYDROCHLORIDE 10 MG/ML
INJECTION INTRAVENOUS
Status: DISCONTINUED | OUTPATIENT
Start: 2018-03-01 | End: 2018-03-01

## 2018-03-01 RX ORDER — FAMOTIDINE 10 MG/ML
INJECTION INTRAVENOUS
Status: COMPLETED
Start: 2018-03-01 | End: 2018-03-01

## 2018-03-01 RX ORDER — SODIUM CHLORIDE AND POTASSIUM CHLORIDE 300; 900 MG/100ML; MG/100ML
INJECTION, SOLUTION INTRAVENOUS CONTINUOUS
Status: DISCONTINUED | OUTPATIENT
Start: 2018-03-01 | End: 2018-03-02

## 2018-03-01 RX ORDER — BETAMETHASONE SODIUM PHOSPHATE AND BETAMETHASONE ACETATE 3; 3 MG/ML; MG/ML
12 INJECTION, SUSPENSION INTRA-ARTICULAR; INTRALESIONAL; INTRAMUSCULAR; SOFT TISSUE ONCE
Status: COMPLETED | OUTPATIENT
Start: 2018-03-01 | End: 2018-03-01

## 2018-03-01 RX ORDER — SODIUM CHLORIDE, SODIUM LACTATE, POTASSIUM CHLORIDE, CALCIUM CHLORIDE 600; 310; 30; 20 MG/100ML; MG/100ML; MG/100ML; MG/100ML
INJECTION, SOLUTION INTRAVENOUS CONTINUOUS
Status: DISCONTINUED | OUTPATIENT
Start: 2018-03-01 | End: 2018-03-01

## 2018-03-01 RX ORDER — BETAMETHASONE SODIUM PHOSPHATE AND BETAMETHASONE ACETATE 3; 3 MG/ML; MG/ML
12 INJECTION, SUSPENSION INTRA-ARTICULAR; INTRALESIONAL; INTRAMUSCULAR; SOFT TISSUE EVERY 24 HOURS
Status: DISCONTINUED | OUTPATIENT
Start: 2018-03-02 | End: 2018-03-01

## 2018-03-01 RX ORDER — SIMETHICONE 80 MG
1 TABLET,CHEWABLE ORAL EVERY 6 HOURS PRN
Status: DISCONTINUED | OUTPATIENT
Start: 2018-03-01 | End: 2018-03-01

## 2018-03-01 RX ORDER — AMOXICILLIN 250 MG
1 CAPSULE ORAL NIGHTLY PRN
Status: DISCONTINUED | OUTPATIENT
Start: 2018-03-01 | End: 2018-03-01

## 2018-03-01 RX ORDER — ACETAMINOPHEN 325 MG/1
650 TABLET ORAL EVERY 6 HOURS
Status: COMPLETED | OUTPATIENT
Start: 2018-03-02 | End: 2018-03-02

## 2018-03-01 RX ORDER — FAMOTIDINE 10 MG/ML
20 INJECTION INTRAVENOUS ONCE
Status: COMPLETED | OUTPATIENT
Start: 2018-03-01 | End: 2018-03-01

## 2018-03-01 RX ORDER — AMOXICILLIN 250 MG
1 CAPSULE ORAL NIGHTLY PRN
Status: DISCONTINUED | OUTPATIENT
Start: 2018-03-01 | End: 2018-03-04 | Stop reason: HOSPADM

## 2018-03-01 RX ORDER — DOCUSATE SODIUM 100 MG/1
200 CAPSULE, LIQUID FILLED ORAL 2 TIMES DAILY
Status: DISCONTINUED | OUTPATIENT
Start: 2018-03-01 | End: 2018-03-04 | Stop reason: HOSPADM

## 2018-03-01 RX ORDER — TERBUTALINE SULFATE 1 MG/ML
0.25 INJECTION SUBCUTANEOUS ONCE
Status: COMPLETED | OUTPATIENT
Start: 2018-03-01 | End: 2018-03-01

## 2018-03-01 RX ORDER — ACETAMINOPHEN 10 MG/ML
INJECTION, SOLUTION INTRAVENOUS
Status: DISCONTINUED | OUTPATIENT
Start: 2018-03-01 | End: 2018-03-01

## 2018-03-01 RX ORDER — KETOROLAC TROMETHAMINE 30 MG/ML
INJECTION, SOLUTION INTRAMUSCULAR; INTRAVENOUS
Status: DISCONTINUED | OUTPATIENT
Start: 2018-03-01 | End: 2018-03-01

## 2018-03-01 RX ORDER — IBUPROFEN 600 MG/1
600 TABLET ORAL EVERY 6 HOURS
Status: DISCONTINUED | OUTPATIENT
Start: 2018-03-02 | End: 2018-03-04 | Stop reason: HOSPADM

## 2018-03-01 RX ORDER — PRENATAL WITH FERROUS FUM AND FOLIC ACID 3080; 920; 120; 400; 22; 1.84; 3; 20; 10; 1; 12; 200; 27; 25; 2 [IU]/1; [IU]/1; MG/1; [IU]/1; MG/1; MG/1; MG/1; MG/1; MG/1; MG/1; UG/1; MG/1; MG/1; MG/1; MG/1
1 TABLET ORAL DAILY
Status: DISCONTINUED | OUTPATIENT
Start: 2018-03-02 | End: 2018-03-01

## 2018-03-01 RX ORDER — SIMETHICONE 80 MG
1 TABLET,CHEWABLE ORAL EVERY 6 HOURS PRN
Status: DISCONTINUED | OUTPATIENT
Start: 2018-03-01 | End: 2018-03-04 | Stop reason: HOSPADM

## 2018-03-01 RX ORDER — KETOROLAC TROMETHAMINE 30 MG/ML
30 INJECTION, SOLUTION INTRAMUSCULAR; INTRAVENOUS EVERY 6 HOURS
Status: COMPLETED | OUTPATIENT
Start: 2018-03-02 | End: 2018-03-02

## 2018-03-01 RX ORDER — BUTORPHANOL TARTRATE 2 MG/ML
2 INJECTION INTRAMUSCULAR; INTRAVENOUS ONCE
Status: DISCONTINUED | OUTPATIENT
Start: 2018-03-01 | End: 2018-03-01

## 2018-03-01 RX ORDER — CLINDAMYCIN PHOSPHATE 900 MG/50ML
900 INJECTION, SOLUTION INTRAVENOUS
Status: COMPLETED | OUTPATIENT
Start: 2018-03-01 | End: 2018-03-01

## 2018-03-01 RX ORDER — OXYCODONE AND ACETAMINOPHEN 5; 325 MG/1; MG/1
1 TABLET ORAL EVERY 4 HOURS PRN
Status: DISCONTINUED | OUTPATIENT
Start: 2018-03-02 | End: 2018-03-04 | Stop reason: HOSPADM

## 2018-03-01 RX ORDER — CARBOPROST TROMETHAMINE 250 UG/ML
INJECTION, SOLUTION INTRAMUSCULAR
Status: DISCONTINUED
Start: 2018-03-01 | End: 2018-03-01 | Stop reason: WASHOUT

## 2018-03-01 RX ORDER — OXYCODONE HYDROCHLORIDE 5 MG/1
10 TABLET ORAL EVERY 4 HOURS PRN
Status: DISPENSED | OUTPATIENT
Start: 2018-03-01 | End: 2018-03-02

## 2018-03-01 RX ORDER — SODIUM CITRATE AND CITRIC ACID MONOHYDRATE 334; 500 MG/5ML; MG/5ML
30 SOLUTION ORAL ONCE
Status: COMPLETED | OUTPATIENT
Start: 2018-03-01 | End: 2018-03-01

## 2018-03-01 RX ORDER — BUPIVACAINE HYDROCHLORIDE 7.5 MG/ML
INJECTION, SOLUTION INTRASPINAL
Status: DISCONTINUED | OUTPATIENT
Start: 2018-03-01 | End: 2018-03-01

## 2018-03-01 RX ORDER — SODIUM CHLORIDE, SODIUM LACTATE, POTASSIUM CHLORIDE, CALCIUM CHLORIDE 600; 310; 30; 20 MG/100ML; MG/100ML; MG/100ML; MG/100ML
INJECTION, SOLUTION INTRAVENOUS CONTINUOUS
Status: CANCELLED | OUTPATIENT
Start: 2018-03-01 | End: 2018-03-02

## 2018-03-01 RX ORDER — HYDROMORPHONE HYDROCHLORIDE 1 MG/ML
1 INJECTION, SOLUTION INTRAMUSCULAR; INTRAVENOUS; SUBCUTANEOUS
Status: DISCONTINUED | OUTPATIENT
Start: 2018-03-01 | End: 2018-03-04 | Stop reason: HOSPADM

## 2018-03-01 RX ORDER — FENTANYL CITRATE 50 UG/ML
INJECTION, SOLUTION INTRAMUSCULAR; INTRAVENOUS
Status: DISCONTINUED | OUTPATIENT
Start: 2018-03-01 | End: 2018-03-01

## 2018-03-01 RX ORDER — SODIUM CHLORIDE, SODIUM LACTATE, POTASSIUM CHLORIDE, CALCIUM CHLORIDE 600; 310; 30; 20 MG/100ML; MG/100ML; MG/100ML; MG/100ML
INJECTION, SOLUTION INTRAVENOUS CONTINUOUS PRN
Status: DISCONTINUED | OUTPATIENT
Start: 2018-03-01 | End: 2018-03-01

## 2018-03-01 RX ORDER — METOPROLOL SUCCINATE 25 MG/1
25 TABLET, EXTENDED RELEASE ORAL DAILY
Status: DISCONTINUED | OUTPATIENT
Start: 2018-03-02 | End: 2018-03-01

## 2018-03-01 RX ORDER — OXYCODONE HYDROCHLORIDE 5 MG/1
5 TABLET ORAL EVERY 4 HOURS PRN
Status: ACTIVE | OUTPATIENT
Start: 2018-03-01 | End: 2018-03-02

## 2018-03-01 RX ORDER — ONDANSETRON HYDROCHLORIDE 2 MG/ML
INJECTION, SOLUTION INTRAMUSCULAR; INTRAVENOUS
Status: DISCONTINUED | OUTPATIENT
Start: 2018-03-01 | End: 2018-03-01

## 2018-03-01 RX ORDER — HYDROCORTISONE 25 MG/G
CREAM TOPICAL 3 TIMES DAILY PRN
Status: DISCONTINUED | OUTPATIENT
Start: 2018-03-01 | End: 2018-03-04 | Stop reason: HOSPADM

## 2018-03-01 RX ORDER — MISOPROSTOL 200 UG/1
TABLET ORAL
Status: DISCONTINUED
Start: 2018-03-01 | End: 2018-03-01 | Stop reason: WASHOUT

## 2018-03-01 RX ORDER — DIPHENHYDRAMINE HCL 25 MG
25 CAPSULE ORAL EVERY 4 HOURS PRN
Status: DISCONTINUED | OUTPATIENT
Start: 2018-03-01 | End: 2018-03-04 | Stop reason: HOSPADM

## 2018-03-01 RX ORDER — OXYTOCIN 10 [USP'U]/ML
INJECTION, SOLUTION INTRAMUSCULAR; INTRAVENOUS
Status: DISCONTINUED | OUTPATIENT
Start: 2018-03-01 | End: 2018-03-01

## 2018-03-01 RX ORDER — OXYTOCIN/RINGER'S LACTATE 20/1000 ML
41.65 PLASTIC BAG, INJECTION (ML) INTRAVENOUS CONTINUOUS
Status: ACTIVE | OUTPATIENT
Start: 2018-03-01 | End: 2018-03-02

## 2018-03-01 RX ADMIN — Medication 30 MG: at 08:03

## 2018-03-01 RX ADMIN — PHENYLEPHRINE HYDROCHLORIDE 100 MCG: 10 INJECTION INTRAVENOUS at 09:03

## 2018-03-01 RX ADMIN — SODIUM CHLORIDE AND POTASSIUM CHLORIDE 125 ML/HR: 9; 2.98 INJECTION, SOLUTION INTRAVENOUS at 11:03

## 2018-03-01 RX ADMIN — OXYCODONE HYDROCHLORIDE 10 MG: 5 TABLET ORAL at 11:03

## 2018-03-01 RX ADMIN — PHENYLEPHRINE HYDROCHLORIDE 200 MCG: 10 INJECTION INTRAVENOUS at 08:03

## 2018-03-01 RX ADMIN — FAMOTIDINE 20 MG: 10 INJECTION INTRAVENOUS at 08:03

## 2018-03-01 RX ADMIN — Medication 41.65 MILLI-UNITS/MIN: at 11:03

## 2018-03-01 RX ADMIN — DOCUSATE SODIUM 200 MG: 100 CAPSULE, LIQUID FILLED ORAL at 11:03

## 2018-03-01 RX ADMIN — KETOROLAC TROMETHAMINE 30 MG: 30 INJECTION, SOLUTION INTRAMUSCULAR; INTRAVENOUS at 09:03

## 2018-03-01 RX ADMIN — SODIUM CHLORIDE, SODIUM LACTATE, POTASSIUM CHLORIDE, AND CALCIUM CHLORIDE: 600; 310; 30; 20 INJECTION, SOLUTION INTRAVENOUS at 08:03

## 2018-03-01 RX ADMIN — POTASSIUM BICARBONATE 25 MEQ: 25 TABLET, EFFERVESCENT ORAL at 11:03

## 2018-03-01 RX ADMIN — GENTAMICIN SULFATE 293 MG: 40 INJECTION, SOLUTION INTRAMUSCULAR; INTRAVENOUS at 08:03

## 2018-03-01 RX ADMIN — FAMOTIDINE 20 MG: 10 INJECTION, SOLUTION INTRAVENOUS at 08:03

## 2018-03-01 RX ADMIN — TERBUTALINE SULFATE 0.25 MG: 1 INJECTION, SOLUTION SUBCUTANEOUS at 06:03

## 2018-03-01 RX ADMIN — OXYTOCIN 3 UNITS: 10 INJECTION, SOLUTION INTRAMUSCULAR; INTRAVENOUS at 09:03

## 2018-03-01 RX ADMIN — BETAMETHASONE SODIUM PHOSPHATE AND BETAMETHASONE ACETATE 12 MG: 3; 3 INJECTION, SUSPENSION INTRA-ARTICULAR; INTRALESIONAL; INTRAMUSCULAR at 07:03

## 2018-03-01 RX ADMIN — SODIUM CITRATE AND CITRIC ACID MONOHYDRATE 30 ML: 500; 334 SOLUTION ORAL at 08:03

## 2018-03-01 RX ADMIN — ACETAMINOPHEN 1000 MG: 10 INJECTION, SOLUTION INTRAVENOUS at 09:03

## 2018-03-01 RX ADMIN — SODIUM CHLORIDE, SODIUM LACTATE, POTASSIUM CHLORIDE, AND CALCIUM CHLORIDE: .6; .31; .03; .02 INJECTION, SOLUTION INTRAVENOUS at 07:03

## 2018-03-01 RX ADMIN — PHENYLEPHRINE HYDROCHLORIDE 300 MCG: 10 INJECTION INTRAVENOUS at 09:03

## 2018-03-01 RX ADMIN — BUPIVACAINE HYDROCHLORIDE IN DEXTROSE 1.6 ML: 7.5 INJECTION, SOLUTION SUBARACHNOID at 08:03

## 2018-03-01 RX ADMIN — CLINDAMYCIN PHOSPHATE 900 MG: 18 INJECTION, SOLUTION INTRAVENOUS at 08:03

## 2018-03-01 RX ADMIN — ONDANSETRON 4 MG: 2 INJECTION, SOLUTION INTRAMUSCULAR; INTRAVENOUS at 08:03

## 2018-03-01 RX ADMIN — FENTANYL CITRATE 10 MCG: 50 INJECTION, SOLUTION INTRAMUSCULAR; INTRAVENOUS at 08:03

## 2018-03-02 ENCOUNTER — PATIENT MESSAGE (OUTPATIENT)
Dept: OBSTETRICS AND GYNECOLOGY | Facility: CLINIC | Age: 38
End: 2018-03-02

## 2018-03-02 PROBLEM — O47.03 PRETERM UTERINE CONTRACTIONS IN THIRD TRIMESTER, ANTEPARTUM: Status: RESOLVED | Noted: 2018-03-01 | Resolved: 2018-03-02

## 2018-03-02 PROBLEM — O09.92 HIGH-RISK PREGNANCY IN SECOND TRIMESTER: Status: RESOLVED | Noted: 2017-09-20 | Resolved: 2018-03-02

## 2018-03-02 LAB
ANION GAP SERPL CALC-SCNC: 9 MMOL/L
BASOPHILS # BLD AUTO: 0 K/UL
BASOPHILS NFR BLD: 0 %
BUN SERPL-MCNC: 3 MG/DL
CALCIUM SERPL-MCNC: 9.4 MG/DL
CHLORIDE SERPL-SCNC: 105 MMOL/L
CO2 SERPL-SCNC: 19 MMOL/L
CREAT SERPL-MCNC: 0.6 MG/DL
DIFFERENTIAL METHOD: ABNORMAL
EOSINOPHIL # BLD AUTO: 0 K/UL
EOSINOPHIL NFR BLD: 0 %
ERYTHROCYTE [DISTWIDTH] IN BLOOD BY AUTOMATED COUNT: 20.2 %
EST. GFR  (AFRICAN AMERICAN): >60 ML/MIN/1.73 M^2
EST. GFR  (NON AFRICAN AMERICAN): >60 ML/MIN/1.73 M^2
GLUCOSE SERPL-MCNC: 153 MG/DL
HCT VFR BLD AUTO: 24.6 %
HGB BLD-MCNC: 7 G/DL
LYMPHOCYTES # BLD AUTO: 0.7 K/UL
LYMPHOCYTES NFR BLD: 5.4 %
MCH RBC QN AUTO: 21.8 PG
MCHC RBC AUTO-ENTMCNC: 28.5 G/DL
MCV RBC AUTO: 77 FL
MONOCYTES # BLD AUTO: 0.4 K/UL
MONOCYTES NFR BLD: 3.3 %
NEUTROPHILS # BLD AUTO: 12 K/UL
NEUTROPHILS NFR BLD: 91.3 %
PLATELET # BLD AUTO: 234 K/UL
PMV BLD AUTO: 9 FL
POTASSIUM SERPL-SCNC: 5 MMOL/L
RBC # BLD AUTO: 3.21 M/UL
SODIUM SERPL-SCNC: 133 MMOL/L
WBC # BLD AUTO: 13.21 K/UL

## 2018-03-02 PROCEDURE — 11000001 HC ACUTE MED/SURG PRIVATE ROOM

## 2018-03-02 PROCEDURE — 99231 SBSQ HOSP IP/OBS SF/LOW 25: CPT | Mod: ,,, | Performed by: OBSTETRICS & GYNECOLOGY

## 2018-03-02 PROCEDURE — 85025 COMPLETE CBC W/AUTO DIFF WBC: CPT

## 2018-03-02 PROCEDURE — 25000003 PHARM REV CODE 250: Performed by: STUDENT IN AN ORGANIZED HEALTH CARE EDUCATION/TRAINING PROGRAM

## 2018-03-02 PROCEDURE — 25000003 PHARM REV CODE 250: Performed by: OBSTETRICS & GYNECOLOGY

## 2018-03-02 PROCEDURE — 63600175 PHARM REV CODE 636 W HCPCS: Performed by: STUDENT IN AN ORGANIZED HEALTH CARE EDUCATION/TRAINING PROGRAM

## 2018-03-02 PROCEDURE — 36415 COLL VENOUS BLD VENIPUNCTURE: CPT

## 2018-03-02 PROCEDURE — 80048 BASIC METABOLIC PNL TOTAL CA: CPT

## 2018-03-02 RX ORDER — METOPROLOL SUCCINATE 25 MG/1
25 TABLET, EXTENDED RELEASE ORAL DAILY
Status: DISCONTINUED | OUTPATIENT
Start: 2018-03-02 | End: 2018-03-04 | Stop reason: HOSPADM

## 2018-03-02 RX ORDER — NIFEDIPINE 10 MG/1
10 CAPSULE ORAL ONCE
Status: COMPLETED | OUTPATIENT
Start: 2018-03-02 | End: 2018-03-02

## 2018-03-02 RX ORDER — ONDANSETRON 8 MG/1
8 TABLET, ORALLY DISINTEGRATING ORAL EVERY 8 HOURS PRN
Status: DISCONTINUED | OUTPATIENT
Start: 2018-03-02 | End: 2018-03-04 | Stop reason: HOSPADM

## 2018-03-02 RX ORDER — FERROUS SULFATE 325(65) MG
325 TABLET, DELAYED RELEASE (ENTERIC COATED) ORAL DAILY
Status: DISCONTINUED | OUTPATIENT
Start: 2018-03-02 | End: 2018-03-04 | Stop reason: HOSPADM

## 2018-03-02 RX ORDER — MUPIROCIN 20 MG/G
1 OINTMENT TOPICAL 2 TIMES DAILY
Status: DISCONTINUED | OUTPATIENT
Start: 2018-03-02 | End: 2018-03-04 | Stop reason: HOSPADM

## 2018-03-02 RX ADMIN — KETOROLAC TROMETHAMINE 30 MG: 30 INJECTION, SOLUTION INTRAMUSCULAR at 03:03

## 2018-03-02 RX ADMIN — HYDROMORPHONE HYDROCHLORIDE 1 MG: 1 INJECTION, SOLUTION INTRAMUSCULAR; INTRAVENOUS; SUBCUTANEOUS at 12:03

## 2018-03-02 RX ADMIN — ACETAMINOPHEN 650 MG: 325 TABLET, FILM COATED ORAL at 04:03

## 2018-03-02 RX ADMIN — ACETAMINOPHEN 650 MG: 325 TABLET, FILM COATED ORAL at 03:03

## 2018-03-02 RX ADMIN — DOCUSATE SODIUM 200 MG: 100 CAPSULE, LIQUID FILLED ORAL at 08:03

## 2018-03-02 RX ADMIN — METOPROLOL SUCCINATE 25 MG: 25 TABLET, EXTENDED RELEASE ORAL at 08:03

## 2018-03-02 RX ADMIN — ACETAMINOPHEN 650 MG: 325 TABLET, FILM COATED ORAL at 09:03

## 2018-03-02 RX ADMIN — NIFEDIPINE 10 MG: 10 CAPSULE, LIQUID FILLED ORAL at 01:03

## 2018-03-02 RX ADMIN — FERROUS SULFATE TAB EC 325 MG (65 MG FE EQUIVALENT) 325 MG: 325 (65 FE) TABLET DELAYED RESPONSE at 08:03

## 2018-03-02 RX ADMIN — KETOROLAC TROMETHAMINE 30 MG: 30 INJECTION, SOLUTION INTRAMUSCULAR at 09:03

## 2018-03-02 RX ADMIN — KETOROLAC TROMETHAMINE 30 MG: 30 INJECTION, SOLUTION INTRAMUSCULAR at 04:03

## 2018-03-02 RX ADMIN — HYDROMORPHONE HYDROCHLORIDE 1 MG: 1 INJECTION, SOLUTION INTRAMUSCULAR; INTRAVENOUS; SUBCUTANEOUS at 01:03

## 2018-03-02 RX ADMIN — IBUPROFEN 600 MG: 600 TABLET, FILM COATED ORAL at 10:03

## 2018-03-02 RX ADMIN — DOCUSATE SODIUM 200 MG: 100 CAPSULE, LIQUID FILLED ORAL at 09:03

## 2018-03-02 NOTE — ASSESSMENT & PLAN NOTE
Postpartum care:  - Patient doing well. Continue routine management and advances.  - Continue PO pain meds. Pain well controlled.  - Encourage ambulation.   - Heme: Pre Delivery h/h 7/25 --> Post Delivery h/h 7/24  - Circumcision - infant in NICU   - Contraception - interested in [facility]  - Lactation - The patient is bottle feeding. Lactation nurse following along PRN  - Rh Status - A pos

## 2018-03-02 NOTE — ANESTHESIA PROCEDURE NOTES
CSE    Patient location during procedure: OR  Start time: 3/1/2018 8:31 PM  Timeout: 3/1/2018 8:30 PM  End time: 3/1/2018 8:40 PM  Staffing  Anesthesiologist: CARMINA CORRALES  Resident/CRNA: KIESHA SUTTON  Performed: resident/CRNA   Preanesthetic Checklist  Completed: patient identified, site marked, surgical consent, pre-op evaluation, timeout performed, IV checked, risks and benefits discussed and monitors and equipment checked  CSE  Patient position: sitting  Prep: ChloraPrep  Patient monitoring: heart rate, cardiac monitor, continuous pulse ox and frequent blood pressure checks  Approach: midline  Spinal Needle  Needle type: pencil-tip   Needle gauge: 25 G  Needle length: 5 in  Epidural Needle  Injection technique: NARCISO saline  Needle type: Tuohy   Needle gauge: 17 G  Needle length: 3.5 in  Needle insertion depth: 6.5 cm  Location: L3-4  Needle localization: anatomical landmarks  Catheter  Catheter type: springwound  Catheter size: 19 G  Catheter at skin depth: 11 cm  Assessment  Sensory level: T5   Dermatomal levels determined by pinch or prick  Intrathecal Medications:  Bolus administered: 1.6 mL of 0.75 and with dextrose bupivacaine  administered: primary anesthetic and 10 mcg of  fentanyl

## 2018-03-02 NOTE — PHYSICIAN QUERY
PT Name: Emily Mckeon  MR #: [id number]     Physician Query Form - Diagnosis Clarification      CDS/: MARÍA ELENA Troy,RNC-MNN           Contact information:[email or web address]    This form is a permanent document in the medical record.     Query Date: 2018    By submitting this query, we are merely seeking further clarification of documentation.  Please utilize your independent clinical judgment when addressing the question(s) below.     The medical record contains the following:      Findings Supporting Clinical Information Location in Medical Record   Admitted from [facility] at 35w1d with unrelenting  contractions, abdominal pain, and cervical change, concerning for PTL versus uterine dehiscence in the setting of 3 prior c/s. Decision made to perform RLTCS. Approved per [facility]. S/p 1 dose of BMZ in triage. RLTCS uncomplicated                 Post-Operative Diagnosis:   1. IUP at 35w1d  2.  contractions with cervical effacement  3. Abdominal pain  4. History of c/s x 3, concern for possible uterine dehiscense    Technical Procedures Used: Low transverse  delivery via pfannenstiel skin incision OB Progress note 3/2@701am              L&D Delivery note 3/2     Please clarify if the  labor diagnosis has been:    [ x] Ruled In  [  ] Ruled In, Now Resolved  [  ] Ruled Out  [  ] Clinically insignificant  [  ] Clinically undetermined  [  ] Other/Clarification of findings (please specify)_______________________________    Please document in your progress notes daily for the duration of treatment, until resolved, and include in your discharge summary.

## 2018-03-02 NOTE — TRANSFER OF CARE
"Anesthesia Transfer of Care Note    Patient: Emily Mckeon    Procedure(s) Performed: Procedure(s) (LRB):  DELIVERY- SECTION (N/A)    Patient location: PACU    Anesthesia Type: CSE    Transport from OR: Transported from OR on room air with adequate spontaneous ventilation    Post pain: adequate analgesia    Post assessment: no apparent anesthetic complications    Post vital signs: stable    Level of consciousness: awake, alert and oriented    Nausea/Vomiting: no nausea/vomiting    Complications: none    Transfer of care protocol was followed      Last vitals:   Visit Vitals  /84   Pulse (!) 124   Temp 36.4 °C (97.5 °F)   Resp 18   Ht 5' 1" (1.549 m)   Wt 74.8 kg (165 lb)   LMP 2017   SpO2 100%   Breastfeeding? No   BMI 31.18 kg/m²     "

## 2018-03-02 NOTE — SUBJECTIVE & OBJECTIVE
Obstetric History       T3      L3     SAB1   TAB0   Ectopic0   Multiple0   Live Births3       # Outcome Date GA Lbr Alejo/2nd Weight Sex Delivery Anes PTL Lv   5 Current            4 SAB            3 Term 10/08/03 39w0d  2.722 kg (6 lb) M CS-LTranv EPI N EMILY   2 Term 00 39w0d  2.722 kg (6 lb) F CS-LTranv EPI N EMILY   1 Term 99 40w0d  2.722 kg (6 lb) M CS-LTranv   EMILY      Complications: Failure to progress in labor        Past Medical History:   Diagnosis Date    Chronic back pain     Elevated cholesterol     Hypertension     Impaired glucose in pregnancy, antepartum     Migraine      Past Surgical History:   Procedure Laterality Date     SECTION, CLASSIC      x3; all at term    LIPOSUCTION W/ FAT INJECTION         PTA Medications   Medication Sig    ACETAMINOPHEN (TYLENOL ORAL) Take by mouth.    aspirin 81 MG Chew Take 81 mg by mouth once daily.    cetirizine (ZYRTEC) 10 MG tablet Take 1 tablet (10 mg total) by mouth once daily.    ferrous sulfate 325 mg (65 mg iron) Tab tablet Take 1 tablet (325 mg total) by mouth once daily.    fluticasone (FLONASE) 50 mcg/actuation nasal spray 1 spray by Each Nare route 2 (two) times daily as needed.    magnesium oxide (MAGOX) 400 mg tablet Take 1 tablet (400 mg total) by mouth once daily.    metoprolol succinate (TOPROL-XL) 25 MG 24 hr tablet Take 1 tablet (25 mg total) by mouth once daily.       Review of patient's allergies indicates:   Allergen Reactions    Demerol [meperidine] Hives    Morphine Hives    Pcn [penicillins]         Family History     None        Social History Main Topics    Smoking status: Never Smoker    Smokeless tobacco: Never Used    Alcohol use No    Drug use: No    Sexual activity: Yes     Partners: Male     Birth control/ protection: None     Review of Systems   Constitutional: Negative for activity change, appetite change, fatigue and fever.   Respiratory: Negative for cough and shortness of  breath.    Cardiovascular: Negative for chest pain and palpitations.   Gastrointestinal: Positive for abdominal pain. Negative for constipation, diarrhea, nausea and vomiting.   Genitourinary: Negative for dysuria, frequency, pelvic pain, vaginal bleeding and vaginal discharge.   Musculoskeletal: Positive for back pain.   Neurological: Negative for headaches.   Psychiatric/Behavioral: Negative for depression. The patient is not nervous/anxious.    Breast: Negative for breast mass and breast pain     Objective:     Vital Signs (Most Recent):  Temp: 97.5 °F (36.4 °C) (03/01/18 2010)  Pulse: 78 (03/01/18 2220)  Resp: 18 (03/01/18 2207)  BP: 127/79 (03/01/18 2207)  SpO2: 100 % (03/01/18 2220) Vital Signs (24h Range):  Temp:  [97.5 °F (36.4 °C)-98 °F (36.7 °C)] 97.5 °F (36.4 °C)  Pulse:  [] 78  Resp:  [18] 18  SpO2:  [100 %] 100 %  BP: (127-148)/(79-97) 127/79     Weight: 74.8 kg (165 lb)  Body mass index is 31.18 kg/m².    FHT: 135bpm Cat 1 (reassuring)  TOCO: Q 2 minutes    Physical Exam:   Constitutional: She is oriented to person, place, and time. She appears well-developed and well-nourished. She appears distressed (secodnary to pain, writhing in bed, breathing through contractions).    HENT:   Head: Normocephalic and atraumatic.     Neck: Normal range of motion.    Cardiovascular: Normal rate, regular rhythm and normal heart sounds.     Pulmonary/Chest: Effort normal and breath sounds normal.        Abdominal: Soft. Bowel sounds are normal. She exhibits distension (gravid). There is tenderness (uterine tenderness between contractions).             Musculoskeletal: Normal range of motion and moves all extremeties.       Neurological: She is alert and oriented to person, place, and time.    Skin: Skin is warm and dry.    Psychiatric: She has a normal mood and affect.       Cervix:  Checked x 2 in triage (2h apart) 0/60/-3 --> 0/80/-3, cervix behind pubic symphysis, very soft    Presentation: Vertex      Significant Labs:  Lab Results   Component Value Date    GROUPTRH A POS 03/01/2018    HEPBSAG Negative 08/15/2017       I have personallly reviewed all pertinent lab results from the last 24 hours.

## 2018-03-02 NOTE — ASSESSMENT & PLAN NOTE
- CHAP trial  - appropriate labs drawn per protocol  - BP mild range on admit  - PreE labs ordered  - Neg PreE ROS

## 2018-03-02 NOTE — L&D DELIVERY NOTE
[facility]   Section   Operative Note    SUMMARY     Date of Procedure: 3/1/2018     Procedure: Procedure(s) (LRB):  DELIVERY- SECTION (N/A)    Surgeon(s) and Role:     * Cong Perez MD - Primary    Assisting Surgeon: Krys Velasco, PGY4    Pre-Operative Diagnosis:  1. IUP at 35w1d  2.  contractions with cervical effacement  3. Abdominal pain  4. History of c/s x 3, concern for possible uterine dehiscense  5. CHTN  6. [facility] recommends delivery    Post-Operative Diagnosis:   1. IUP at 35w1d  2.  contractions with cervical effacement  3. Abdominal pain  4. History of c/s x 3, concern for possible uterine dehiscense  5. CHTN  6. [facility] recommends delivery  7. Significant Intraabdominal scarring  8. True knot in umbilical cord    Anesthesia: Spinal/Epidural    Technical Procedures Used: Low transverse  delivery via pfannenstiel skin incision           Description of the Findings of the Procedure:  1. After fascial incision was made significant scarring was noted to anterior abdominal wall  2. Muscles were transected laterally  3. Uterus was scarred to anterior abdominal wall, immobile, unable to enter peritoneum  4. Small incidental superficial abrasion created on anterior uterus, closed with figure of eight stitch  5. Hysterotomy performed, delivered infant without difficulty  6. True knot in umbilical cord was noted  7. Unable to visualize uterine fundus, either tube or ovary secondary to scarring   8. Sterile milk placed in bladder with no extravasation noted, hysterotomy well away from bladder edge    Complications: No    Blood Loss: 693cc    The patient was seen in the Holding Room. The risks, benefits, complications, treatment options, and expected outcomes were discussed with the patient.  The patient concurred with the proposed plan, giving informed consent.  The site of surgery properly noted. The patient was taken to Operating Room, identified as  Emily Mckeon and the procedure verified as  Delivery. A Time Out was held and the above information confirmed.    After induction of anesthesia, the patient was prepped and draped in the usual sterile manner while placed in a dorsal supine position with a left lateral tilt.  A hartmann catheter was also placed per nursing. Preoperative antibiotics were administered and an allis test was performed yielding adequate anesthesia.  A Pfannenstiel incision was made and carried down through the subcutaneous tissue to the fascia. Fascial incision was made and extended transversely. At this point significant anterior abdominal wall scarring was noted. The fascia was grasped with [facility] clamps and  from the underlying rectus tissue superiorly and inferiorly. The rectus muscles were elevated with allis clamps with scar tissue noted in between. They were divided vertically with the scalpel. There peritoneum and uterus were adhered to the rectus muscles and the peritoneum was unable to entered. The rectus muscles were transected laterally on the right and left to allow for more space for hysterotomy. During this time an incidental abrasion was made on the anterior surface of the uterus, later repaired. The vesico-uterine peritoneum was identified and bladder blade was inserted. The bladder was also scarred to the lower uterine segment. A low transverse uterine incision was made with knife and extended with finger fracture. The amniotic sac was ruptured with a hemostat and the infant was noted to be in vertex position. The head was brought to the incision and elevated out of the pelvis. The patient delivered a single viable male infant without difficulty.  Infant weighed 2551 grams. NICU attended and admitted infant for increased work of breathing requiring O2. After the umbilical cord was clamped and cut cord blood was obtained for evaluation. A true knot in the umbilical cord was noted with no obstruction of  flow. The placenta was removed intact and appeared normal and was sent to pathology. The uterus was unable to be exteriorized as it was adhered on all sides to the anterior abdominal wall. The uterine outline, tubes and ovaries were unable to be visualized. The uterine incision was closed with running locked sutures of #1 chromic. The uterine abrasion created incidentally upon entry was closed with figure of eight and one interrupted suture of #1 chromic. Hemostasis was observed. 120cc of sterie milk were placed into the bladder, no extravasation was noted and the bladder border was noted to be well away from the hysterotomy. Incision was reinspected and good hemostasis was noted. The incision was irrigated. Muscle was reapproximated with 2-0 chromic interrupted suture. The fascia was then reapproximated with running sutures of 0 PDS. The subcutaneous fat was closed with interrupted 2-0 vicryl and skin was reapproximated with 4-0 monocryl.    Instrument, sponge, and needle counts were correct prior the abdominal closure and at the conclusion of the case.     Pt tolerated procedure well and was in stable condition after the procedure.        Specimens:   Specimen (12h ago through future)    Start     Ordered    18  Specimen to Pathology - Surgery  Once     Comments:  [facility] [id number]      18          Condition: Good    Disposition: PACU - hemodynamically stable.    Attestation: Stable         Delivery Information for  Jules Mckeon    Birth information:  YOB: 2020   Time of birth: 9:10 PM   Sex: male   Head Delivery Date/Time: 3/1/2018  9:10 PM   Delivery type: , Low Transverse   Gestational Age: 35w1d    Delivery Providers    Delivering clinician:  Cong Perez MD   Provider Role    Soto Velez RN Charge Nurse    Meri Kay RN Circulator    Krys Velasco MD Resident    Hari Garcia MD Resident    Lorna Adames Surgical Tech    Stefani Bowens MD  Anesthesiologist    Matt Pedersen MD Anesthesiologist             Measurements    Weight:  2551 g  Head circumference:  34 cm  Chest circumference:  30 cm         Blaine Assessment    Living status:  Living  Apgars:     1 Minute:   5 Minute:   10 Minute:   15 Minute:   20 Minute:     Skin Color:          Heart Rate:          Reflex Irritability:          Muscle Tone:          Respiratory Effort:          Total:                  Apgars Assigned By:  SEE NICU CHARTING         Assisted Delivery Details:    Forceps attempted?:  No  Vacuum extractor attempted?:  No         Shoulder Dystocia    Shoulder dystocia present?:  No           Presentation and Position    Presentation:  Vertex  Position:  Middle Occiput Anterior           Interventions/Resuscitation    Method:  NICU Attended       Cord    Vessels:  3 vessels  Complications:  None  Delayed Cord Clamping?:  No  Cord Blood Disposition:  Sent with Baby  Gases Sent?:  Yes  Stem Cell Collection (by MD):  No       Placenta    Date and time:  3/1/2018  9:12 PM  Removal:  Manual removal  Appearance:  Intact  Placenta disposition:  pathology           Labor Events:       labor: No     Labor Onset Date/Time:         Dilation Complete Date/Time:         Start Pushing Date/Time:       Rupture Date/Time:              Rupture type:           Fluid Amount:        Fluid Color:        Fluid Odor:        Membrane Status (PeriCalm): INT (Intact)      Rupture Date/Time (PeriCalm):        Fluid Amount (PeriCalm):        Fluid Color (PeriCalm):         steroids: Partial Course     Antibiotics given for GBS: No     Induction:       Indications for induction:        Augmentation:       Indications for augmentation:       Labor complications:       Additional complications:          Cervical ripening:                     Delivery:      Episiotomy:       Indication for Episiotomy:       Perineal Lacerations:   Repaired:      Periurethral Laceration:   Repaired:      Labial Laceration:   Repaired:     Sulcus Laceration:   Repaired:     Vaginal Laceration:   Repaired:     Cervical Laceration:   Repaired:     Repair suture:       Repair # of packets: 11     Vaginal delivery QBL (mL): 0      QBL (mL): 0     Combined Blood Loss (mL): 0     Vaginal Sweep Performed: No     Surgicount Correct: Yes       Other providers:       Anesthesia    Method:  Spinal          Details (if applicable):  Trial of Labor No    Categorization: Repeat    Priority: Routine   Indications for : Repeat Section   Incision Type: low transverse     Additional  information:  Forceps:    Vacuum:    Breech:    Observed anomalies    Other (Comments): NICU attend for dates and infant admitted to NICU floor.       Krys Velasco M.D.  PGY-4 OB/GYN

## 2018-03-02 NOTE — ASSESSMENT & PLAN NOTE
- CHAP trial  - on metoprolol 25mg daily  - BP: (127-164)/() 159/87  - AST/ALT 18/5  - P/C ratio 0.18  - CBC 7/24/234

## 2018-03-02 NOTE — ANESTHESIA PREPROCEDURE EVALUATION
2018  Emily Mckeon is a 37 y.o. female     OB History    Para Term  AB Living   5 3 3   1 3   SAB TAB Ectopic Multiple Live Births   1       3      # Outcome Date GA Lbr Alejo/2nd Weight Sex Delivery Anes PTL Lv   5 Current            4 SAB 2005           3 Term 10/08/03 39w0d  2.722 kg (6 lb) M CS-LTranv EPI N EMILY   2 Term 00 39w0d  2.722 kg (6 lb) F CS-LTranv EPI N EMILY   1 Term 99 40w0d  2.722 kg (6 lb) M CS-LTranv   EMILY      Complications: Failure to progress in labor          Wt Readings from Last 1 Encounters:   18 74.8 kg (165 lb)       BP Readings from Last 3 Encounters:   18 129/84   18 130/80   18 125/72       Patient Active Problem List   Diagnosis    High-risk pregnancy in second trimester    Chronic hypertension affecting pregnancy    Impaired glucose in pregnancy, antepartum- T1 OB glucose 137    Elderly multigravida    Tension type headache    H/O  section x3    Iron deficiency anemia secondary to inadequate dietary iron intake    Headache in pregnancy    Pregnancy headache in second trimester    Normocytic anemia     contractions     uterine contractions in third trimester, antepartum       Past Surgical History:   Procedure Laterality Date     SECTION, CLASSIC      x3; all at term    LIPOSUCTION W/ FAT INJECTION         Social History     Social History    Marital status: Single     Spouse name: N/A    Number of children: N/A    Years of education: N/A     Occupational History    Not on file.     Social History Main Topics    Smoking status: Never Smoker    Smokeless tobacco: Never Used    Alcohol use No    Drug use: No    Sexual activity: Yes     Partners: Male     Birth control/ protection: None     Other Topics Concern    Not on file     Social History Narrative    No narrative  on file         Chemistry        Component Value Date/Time     (L) 03/01/2018 1928    K 3.0 (L) 03/01/2018 1928     03/01/2018 1928    CO2 18 (L) 03/01/2018 1928    BUN 4 (L) 03/01/2018 1928    CREATININE 0.6 03/01/2018 1928     03/01/2018 1928        Component Value Date/Time    CALCIUM 9.1 03/01/2018 1928    ALKPHOS 114 03/01/2018 1928    AST 18 03/01/2018 1928    ALT 5 (L) 03/01/2018 1928    BILITOT 0.6 03/01/2018 1928    ESTGFRAFRICA >60 03/01/2018 1928    EGFRNONAA >60 03/01/2018 1928            Lab Results   Component Value Date    WBC 9.36 03/01/2018    HGB 7.3 (L) 03/01/2018    HCT 25.6 (L) 03/01/2018    MCV 76 (L) 03/01/2018     03/01/2018       No results for input(s): PT, INR, PROTIME, APTT in the last 72 hours.      Anesthesia Evaluation    I have reviewed the Patient Summary Reports.      I have reviewed the Medications.     Review of Systems  Anesthesia Hx:  History of prior surgery of interest to airway management or planning: Denies Family Hx of Anesthesia complications.   Denies Personal Hx of Anesthesia complications.   Hematology/Oncology:         -- Anemia:   Cardiovascular:   Hypertension    Pulmonary:  Pulmonary Normal    Renal/:  Renal/ Normal     Hepatic/GI:  Hepatic/GI Normal    Neurological:   Headaches    Endocrine:  Endocrine Normal        Physical Exam  General:  Well nourished, Obesity    Airway/Jaw/Neck:  Airway Findings: Mouth Opening: Small, but > 3cm Tongue: Normal  General Airway Assessment: Possible difficult mask airway, Possible difficult intubation  Mallampati: III  Improves to II with phonation.  TM Distance: Normal, at least 6 cm  Jaw/Neck Findings:  Neck ROM: Normal ROM  Neck Findings:  Girth Increased      Dental:  Dental Findings: In tact   Chest/Lungs:  Chest/Lungs Clear    Heart/Vascular:  Heart Findings: Normal       Mental Status:  Mental Status Findings:  Cooperative, Alert and Oriented         Anesthesia Plan  Type of Anesthesia, risks &  benefits discussed:  Anesthesia Type:  CSE, epidural, general, spinal  Patient's Preference:   Intra-op Monitoring Plan:   Intra-op Monitoring Plan Comments:   Post Op Pain Control Plan:   Post Op Pain Control Plan Comments:   Induction:    Beta Blocker:  Patient is on a Beta-Blocker and has received one dose within the past 24 hours (No further documentation required).       Informed Consent: Patient understands risks and agrees with Anesthesia plan.  Questions answered. Anesthesia consent signed with patient.  ASA Score: 2     Day of Surgery Review of History & Physical:    H&P update referred to the surgeon.         Ready For Surgery From Anesthesia Perspective.

## 2018-03-02 NOTE — PLAN OF CARE
COPIED FROM MOM'S CHART    SOCIAL WORK DISCHARGE PLANNING ASSESSMENT    Sw completed discharge planning assessment with pt's parents in mother's room 616.  Pt's parents were easily engaged. Education on the role of  was provided. Emotional support provided throughout assessment.      Legal Name: Ameer (Mike or Albaro) :  3/1/2018    Patient Active Problem List   Diagnosis      infant of 35 completed weeks of gestation    Acute respiratory distress in  with surfactant disorder         [facility]irth [facility]   RICHARD: 2020    Birth Weight: 2.551 kg (5 lb 10 oz)  Birth Length: 47.0cm  Gestational Age: 35w1d          [place] Assessment    Living status:  Living  Apgars:     1 Minute:   5 Minute:   10 Minute:   15 Minute:   20 Minute:     Skin Color:          Heart Rate:          Reflex Irritability:          Muscle Tone:          Respiratory Effort:          Total:                  Apgars Assigned By:  SEE NICU CHARTING         Mother: Emily Mckeon, age 39,  1980  Address: [address] [place], [place] [address] [address]  Phone: [phone]  Employer: [employer]    Job Title: Lead Dispatcher for Transport  Education: high school diploma       Father: Dario Irvin, age 41,  1976  Address: [address] [place], [place] [address] [address]  Phone: [phone]  Employer: [employer] (self-employed)  Job Title:   Education:  high school diploma  Signed Birth Certificate: Yes; mom reports that they are .    Support person(s): Armandpriyanka Rick (mom's best friend) [phone]; Carina Mike (maternal aunt) [phone]    Sibling(s): Tyrec-19, Annalise-18 and Dario-14    Spiritual Affiliation: Yes  Jewish    Commercial Insurance Coverage: No        [facility] Health Plan ([place] Medicaid[place] Primary: Yes Secondary: No   [facility]     Pediatrician: Saira Lund      Nutrition: Formula    Breast Pump:   N/A        WIC:   Mom already  certified; will also apply for . Instructed her to have form completed at pediatrician's appointment.       Essential Items: (includes car seat, crib/bassinet/pack-n-play, clothing, bottles, diapers, etc.)  Acquired     Transportation: Personal vehicle     Education: Information given on CPR classes and Physician/NNP daily rounds.     Potential Eligibility for SSI Benefits: No    Potential Discharge Needs:  None     Cynthia Hair LCSW  NICU   Ext. [phone]-phone  [email or web address]

## 2018-03-02 NOTE — ASSESSMENT & PLAN NOTE
- Patient was monitored in [facility] for >2h. Minimal cervical change was noted, but regular ctx 1-2 minutes apart continued despite terb and IVF. 1 dose of BMZ given in triage. Discussed risks of early delivery versus risk of uterine dehiscense/rupture given patients history of 3 prior c/s and significant scar tissue noted on last c/s.  - Patient is very uncomfortable, writhing in pain with contractions, and pain remains when not jimbo. Uterine tenderness on exam, cervical effacement noted. Reassuring fetal status.  - Discussed with Dr. Perez and Dr. Gutierrez ([facility]), who recommend RLTCS tonight.  - Patient in agreement with plan.    - Consents signed and to chart  - Admit to Labor and Delivery unit  - Epidural per Anesthesia  - Draw CBC, T&S, 2u pRBC held  - Gent/clinda OCTOR  - To OR for C/S. Case Request is in.   - Notify Staff  - Ultrasound performed, infant in vertex position.   - Post-Partum Hemorrhage risk - low

## 2018-03-02 NOTE — PROGRESS NOTES
[facility]  Obstetrics  Postpartum Progress Note    Patient Name: Emily Mckeon  MRN: [id number]  Admission Date: 3/1/2018  Hospital Length of Stay: 1 days  Attending Physician: Cong Perez MD  Primary Care Provider: Primary Doctor No    Subjective:     Principal Problem: uterine contractions in third trimester, antepartum    Hospital course: 2018 - Admitted from [facility] at 35w1d with unrelenting  contractions, abdominal pain, and cervical change, concerning for PTL versus uterine dehiscence in the setting of 3 prior c/s. Decision made to perform RLTCS. Approved per [facility]. S/p 1 dose of BMZ in triage. RLTCS uncomplicated, see operative note for details.   2018- POD#1 s/p RLTCS. Doing well, hartmann still in. Meeting other post operative milestones. Denies preE ROS. BP: (127-164)/() 159/87      Interval History:     She is doing well this morning. She is tolerating a regular diet without nausea or vomiting. She is not voiding spontaneously. She is not ambulating. She has not passed flatus, and has not a BM. Vaginal bleeding is mild. She denies fever or chills. Abdominal pain is mild and controlled with oral medications. She is not breastfeeding. She desires circumcision for her male baby: yes. She denies any Ha, Cp, SOB, vision changes, or RUQ pain.     Objective:     Vital Signs (Most Recent):  Temp: 97.8 °F (36.6 °C) (18 030)  Pulse: 84 (18)  Resp: 20 (18)  BP: (!) 159/87 (rn notified) (18)  SpO2: 97 % (18) Vital Signs (24h Range):  Temp:  [97.5 °F (36.4 °C)-98 °F (36.7 °C)] 97.8 °F (36.6 °C)  Pulse:  [] 84  Resp:  [18-20] 20  SpO2:  [97 %-100 %] 97 %  BP: (127-164)/() 159/87     Weight: 74.8 kg (165 lb)  Body mass index is 31.18 kg/m².      Intake/Output Summary (Last 24 hours) at 18 0644  Last data filed at 18 0030   Gross per 24 hour   Intake              580 ml   Output              600 ml    Net              -20 ml       Significant Labs:  Lab Results   Component Value Date    GROUPTRH A POS 2018    HEPBSAG Negative 08/15/2017       Recent Labs  Lab 18  0424   HGB 7.0*   HCT 24.6*       CBC:   Recent Labs  Lab 18  0424   WBC 13.21*   RBC 3.21*   HGB 7.0*   HCT 24.6*      MCV 77*   MCH 21.8*   MCHC 28.5*     I have personallly reviewed all pertinent lab results from the last 24 hours.    Physical Exam:   Constitutional: She is oriented to person, place, and time. She appears well-developed and well-nourished. No distress.    HENT:   Head: Normocephalic and atraumatic.      Cardiovascular: Normal rate, regular rhythm and normal heart sounds.     Pulmonary/Chest: Effort normal and breath sounds normal.        Abdominal: Soft. Bowel sounds are normal. She exhibits abdominal incision (c/d/i bandage in place). She exhibits no distension. There is no tenderness.   Fundus difficult to palpate 2/2 to body habitus              Musculoskeletal: Normal range of motion. She exhibits no edema.       Neurological: She is alert and oriented to person, place, and time.    Skin: Skin is warm and dry.    Psychiatric: She has a normal mood and affect. Her behavior is normal. Judgment and thought content normal.       Assessment/Plan:     37 y.o. female  for:     delivery delivered    Postpartum care:  - Patient doing well. Continue routine management and advances.  - Continue PO pain meds. Pain well controlled.  - Encourage ambulation.   - Heme: Pre Delivery h/h  --> Post Delivery h/h   - Circumcision - infant in NICU   - Contraception - interested in [facility]  - Lactation - The patient is bottle feeding. Lactation nurse following along PRN  - Rh Status - A pos            Iron deficiency anemia secondary to inadequate dietary iron intake    - H/H 7.3/25.6 pre op  -  in surgery  - will start on Fe and scheduled colace        Hypokalemia     CMP on admit showed K of  3  Patient started on IV fluids with 40meq of K  Repeat BMP showed K of 5  D/C fluids, ordered repeat BMP      Chronic hypertension affecting pregnancy    - CHAP trial  - on metoprolol 25mg daily  - BP: (127-164)/() 159/87  - AST/ALT 18/5  - P/C ratio 0.18  - CBC 7/24/234            Disposition: As patient meets milestones, will plan to discharge on POD#3-4.    Maryam Murillo MD  Obstetrics  [facility]

## 2018-03-02 NOTE — SUBJECTIVE & OBJECTIVE
Hospital course: 2018 - Admitted from [facility] at 35w1d with unrelenting  contractions, abdominal pain, and cervical change, concerning for PTL versus uterine dehiscence in the setting of 3 prior c/s. Decision made to perform RLTCS. Approved per M. S/p 1 dose of BMZ in triage. RLTCS uncomplicated, see operative note for details.   2018- POD#1 s/p RLTCS. Doing well, hartmann still in. Meeting other post operative milestones. Denies preE ROS. BP: (127-164)/() 159/87      Interval History:     She is doing well this morning. She is tolerating a regular diet without nausea or vomiting. She is not voiding spontaneously. She is not ambulating. She has not passed flatus, and has not a BM. Vaginal bleeding is mild. She denies fever or chills. Abdominal pain is mild and controlled with oral medications. She is not breastfeeding. She desires circumcision for her male baby: yes.    Objective:     Vital Signs (Most Recent):  Temp: 97.8 °F (36.6 °C) (18)  Pulse: 84 (18)  Resp: 20 (18)  BP: (!) 159/87 (rn notified) (18)  SpO2: 97 % (18) Vital Signs (24h Range):  Temp:  [97.5 °F (36.4 °C)-98 °F (36.7 °C)] 97.8 °F (36.6 °C)  Pulse:  [] 84  Resp:  [18-20] 20  SpO2:  [97 %-100 %] 97 %  BP: (127-164)/() 159/87     Weight: 74.8 kg (165 lb)  Body mass index is 31.18 kg/m².      Intake/Output Summary (Last 24 hours) at 18 0644  Last data filed at 18 0030   Gross per 24 hour   Intake              580 ml   Output              600 ml   Net              -20 ml       Significant Labs:  Lab Results   Component Value Date    GROUPTRH A POS 2018    HEPBSAG Negative 08/15/2017       Recent Labs  Lab 18  0424   HGB 7.0*   HCT 24.6*       CBC:   Recent Labs  Lab 184   WBC 13.21*   RBC 3.21*   HGB 7.0*   HCT 24.6*      MCV 77*   MCH 21.8*   MCHC 28.5*     I have personallly reviewed all pertinent lab results from the last  24 hours.    Physical Exam:   Constitutional: She is oriented to person, place, and time. She appears well-developed and well-nourished. No distress.    HENT:   Head: Normocephalic and atraumatic.      Cardiovascular: Normal rate, regular rhythm and normal heart sounds.     Pulmonary/Chest: Effort normal and breath sounds normal.        Abdominal: Soft. Bowel sounds are normal. She exhibits abdominal incision (c/d/i bandage in place). She exhibits no distension. There is no tenderness.   Fundus difficult to palpate 2/2 to body habitus              Musculoskeletal: Normal range of motion. She exhibits no edema.       Neurological: She is alert and oriented to person, place, and time.    Skin: Skin is warm and dry.    Psychiatric: She has a normal mood and affect. Her behavior is normal. Judgment and thought content normal.

## 2018-03-02 NOTE — ASSESSMENT & PLAN NOTE
- anticipate significant amount of scar tissue given reported history per patient  - 2u pRBC held  - concern for possible uterine dehiscence given pain level

## 2018-03-02 NOTE — ED PROVIDER NOTES
Encounter Date: 3/1/2018       History     Chief Complaint   Patient presents with    Contractions     Emily Mckeon is a 37 y.o. [id number] at 35w1d presenting with uterine contractions. Patient reports they began early this morning and have continued all day. Currently every 2-3 minutes and painful. No vaginal bleeding or LOF. Normal fetal movement. This IUP is complicated by cHTN, AMA, and history of  section x 3. Patient denies fevers, chills, nausea, vomiting. Patient reports not eating or drinking anything today.          Review of patient's allergies indicates:   Allergen Reactions    Demerol [meperidine] Hives    Morphine Hives    Pcn [penicillins]      Past Medical History:   Diagnosis Date    Chronic back pain     Elevated cholesterol     Hypertension     Impaired glucose in pregnancy, antepartum     Migraine      Past Surgical History:   Procedure Laterality Date     SECTION, CLASSIC      x3; all at term    LIPOSUCTION W/ FAT INJECTION       Family History   Problem Relation Age of Onset    Breast cancer Neg Hx     Colon cancer Neg Hx     Ovarian cancer Neg Hx      Social History   Substance Use Topics    Smoking status: Never Smoker    Smokeless tobacco: Never Used    Alcohol use No     Review of Systems   Constitutional: Negative for chills and fever.   HENT: Negative for congestion.    Eyes: Negative for visual disturbance.   Respiratory: Negative for shortness of breath.    Cardiovascular: Negative for chest pain.   Gastrointestinal: Positive for abdominal pain. Negative for diarrhea, nausea and vomiting.   Genitourinary: Negative for dysuria, hematuria, vaginal bleeding and vaginal discharge.   Musculoskeletal: Negative for back pain.   Skin: Negative for rash.   Neurological: Negative for dizziness and headaches.   Psychiatric/Behavioral: Negative for confusion.       Physical Exam   Temp:  [98 °F (36.7 °C)] 98 °F (36.7 °C)  Pulse:  [] 124  Resp:  [18] 18  SpO2:   [100 %] 100 %  BP: (129-148)/(84-97) 129/84    Physical Exam    Vitals reviewed.  Constitutional: She appears well-developed and well-nourished. She is not diaphoretic. No distress.   HENT:   Head: Normocephalic and atraumatic.   Eyes: EOM are normal.   Cardiovascular: Normal rate.   Pulmonary/Chest: No respiratory distress.   Abdominal: Soft. She exhibits no distension. There is no tenderness. There is no rebound and no guarding.   Musculoskeletal: Normal range of motion. She exhibits no edema or tenderness.   Neurological: She is alert and oriented to person, place, and time.   Skin: Skin is warm and dry.   Psychiatric: She has a normal mood and affect. Her behavior is normal. Judgment and thought content normal.     OB LABOR EXAM:   Pre-Term Labor: No.   Membranes ruptured: No.   Method: Sterile vaginal exam per MD.   Vaginal Bleeding: none present.     Dilatation: 0.   Station: -3.   Effacement: 50%.   Amniotic Fluid Color: no fluid.   Amniotic Fluid Amount: none noted.         ED Course   Obtain Fetal nonstress test (NST)  Date/Time: 3/1/2018 6:31 PM  Performed by: MINE MACIEL  Authorized by: MINE MACIEL     Nonstress Test:     Variability:  6-25 BPM    Decelerations:  None    Accelerations:  15 bpm    Baseline:  140    Contraction Frequency:  Q 2-5 min  Biophysical Profile:     Nonstress Test Interpretation: reactive      Overall Impression:  Reassuring      Labs Reviewed   CBC W/ AUTO DIFFERENTIAL - Abnormal; Notable for the following:        Result Value    RBC 3.35 (*)     Hemoglobin 7.3 (*)     Hematocrit 25.6 (*)     MCV 76 (*)     MCH 21.8 (*)     MCHC 28.5 (*)     RDW 20.3 (*)     All other components within normal limits   PROTEIN / CREATININE RATIO, URINE - Abnormal; Notable for the following:     Protein, Urine Random 24 (*)     All other components within normal limits   STREP B SCREEN, VAGINAL / RECTAL   COMPREHENSIVE METABOLIC PANEL   TYPE & SCREEN   PREPARE RBC SOFT             Medical  Decision Making:   Initial Assessment:   Nontoxic appearing, hemodynamically stable. Appears to be in pain.  ED Management:  Urine dip shows 2+ ketones  Large pitcher of water given to patient for PO hydration  1 dose terbutaline given --> no relief  DC PO, IV placed for IVF. Admit labs drawn  1 dose betamethasone given  Cervical recheck showed no change, however patient continued to contract painfully and frequently. Decision was made to move to the OR for repeat  section      Other:   I have discussed this case with another health care provider.       <> Summary of the Discussion: Staffed with Dr. Korin Spivey  Staffed with Dr. Korin Spivey and Dr. Malini Stiles.  Discussed with patient's primary OBGYN, Dr. Iliana Domínguez              Attending Attestation:   Physician Attestation Statement for Resident:  As the supervising MD   Physician Attestation Statement: I have personally seen and examined this patient.   I agree with the above history. -:   As the supervising MD I agree with the above PE.    As the supervising MD I agree with the above treatment, course, plan, and disposition.   -: Patient evaluated and found to be stable, agree with resident's assessment and plan. The patient was given IIV stadol in the ED for severe pain requiring intende monitoring.   I was personally present during the critical portions of the procedure(s) performed by the resident and was immediately available in the ED to provide services and assistance as needed during the entire procedure.  I have reviewed and agree with the residents interpretation of the following: lab data.  I have reviewed the following: old records at this facility.                    ED Course as of Mar 01 2022   Thu Mar 01, 2018   1924 Discussed case with Dr. Spivey, will sign consents, admit labs, steroids, recheck cervix, pain control with observation, possible RLTCS/BTL. H/o pelvic adhesive disease/anemia. Type and match   [AR]      ED Course  User Index  [AR] aMlini Stiles MD     Clinical Impression:   The primary encounter diagnosis was  uterine contractions in third trimester, antepartum. Diagnoses of 35 weeks gestation of pregnancy, Previous  delivery, antepartum, Chronic hypertension in obstetric context in third trimester, Mild dehydration, H/O  section x3, and  contractions were also pertinent to this visit.    Disposition:   Disposition: Admitted  Condition: Stable     Hari Garcia MD  PGY1, OBGYN [facility]                   Hari Garcia MD  Resident  [phone]       Malini Stiles MD  [phone]

## 2018-03-02 NOTE — HPI
Emily Mckeon is a 37 y.o. [id number] at 35w1d presented to [facility] complaining of very painful uterine contractions for most of the day.     This IUP is complicated by CHTN (no meds, CHAP trial), hx of 3 prior c/s (significant scar tissue per patient), HLD, migraines, fibroid uterus, anemia.    Patient reports contractions have become more painful and more frequent throughout the day. Pain is 9/10. States she continues to have pain even when she is not jimbo. States she is breathing hard and unable to sit still secondary to the pain.  Denies vaginal bleeding, denies LOF.   Fetal Movement: normal.  Patient denies headache, scotoma, RUQ pain, N/V, CP, SOB.

## 2018-03-02 NOTE — HOSPITAL COURSE
2018 - Admitted from [facility] at 35w1d with unrelenting  contractions, abdominal pain, and cervical change, concerning for PTL versus uterine dehiscence in the setting of 3 prior c/s. Decision made to perform RLTCS. Approved per MFM. S/p 1 dose of BMZ in triage. RLTCS uncomplicated, see operative note for details.   2018- POD#1 s/p RLTCS. Doing well, hartmann still in. Meeting other post operative milestones. Denies preE ROS. BP: (127-164)/() 159/87  2018 - POD#2 s/p RLTCS. She is doing well this morning. She is ambulating, voiding and tolerating PO. Infant in NICU doing well. Patient is bottle feeding but attempting to pump.  2018 POD#3. Dong well, meeting post op milestones. Bottle and breast feeding. Denies preEROS. BP: (120-150)/(63-95) 150/80. Plan to discharge home today.

## 2018-03-03 PROCEDURE — 99232 SBSQ HOSP IP/OBS MODERATE 35: CPT | Mod: ,,, | Performed by: OBSTETRICS & GYNECOLOGY

## 2018-03-03 PROCEDURE — 25000003 PHARM REV CODE 250: Performed by: OBSTETRICS & GYNECOLOGY

## 2018-03-03 PROCEDURE — 25000003 PHARM REV CODE 250: Performed by: STUDENT IN AN ORGANIZED HEALTH CARE EDUCATION/TRAINING PROGRAM

## 2018-03-03 PROCEDURE — 11000001 HC ACUTE MED/SURG PRIVATE ROOM

## 2018-03-03 RX ADMIN — FERROUS SULFATE TAB EC 325 MG (65 MG FE EQUIVALENT) 325 MG: 325 (65 FE) TABLET DELAYED RESPONSE at 12:03

## 2018-03-03 RX ADMIN — METOPROLOL SUCCINATE 25 MG: 25 TABLET, EXTENDED RELEASE ORAL at 12:03

## 2018-03-03 RX ADMIN — IBUPROFEN 600 MG: 600 TABLET, FILM COATED ORAL at 03:03

## 2018-03-03 RX ADMIN — IBUPROFEN 600 MG: 600 TABLET, FILM COATED ORAL at 12:03

## 2018-03-03 RX ADMIN — DOCUSATE SODIUM 200 MG: 100 CAPSULE, LIQUID FILLED ORAL at 08:03

## 2018-03-03 RX ADMIN — DOCUSATE SODIUM 200 MG: 100 CAPSULE, LIQUID FILLED ORAL at 12:03

## 2018-03-03 RX ADMIN — IBUPROFEN 600 MG: 600 TABLET, FILM COATED ORAL at 06:03

## 2018-03-03 RX ADMIN — OXYCODONE HYDROCHLORIDE AND ACETAMINOPHEN 1 TABLET: 10; 325 TABLET ORAL at 10:03

## 2018-03-03 NOTE — SUBJECTIVE & OBJECTIVE
Hospital course: 2018 - Admitted from [facility] at 35w1d with unrelenting  contractions, abdominal pain, and cervical change, concerning for PTL versus uterine dehiscence in the setting of 3 prior c/s. Decision made to perform RLTCS. Approved per [facility]. S/p 1 dose of BMZ in triage. RLTCS uncomplicated, see operative note for details.   2018- POD#1 s/p RLTCS. Doing well, hartmann still in. Meeting other post operative milestones. Denies preE ROS. BP: (127-164)/() 159/87  2018 - POD#2 s/p RLTCS. She is doing well this morning. She is ambulating, voiding and tolerating PO. Infant in NICU doing well. Patient is bottle feeding but attempting to pump.    Interval History:   She is doing well this morning. She is tolerating a regular diet without nausea or vomiting. She is voiding spontaneously. She is ambulating. She has not passed flatus, and has not a BM. Vaginal bleeding is moderate. She denies fever or chills. Abdominal pain is moderate and controlled with oral medications. She is attempting breastfeeding.   Objective:     Vital Signs (Most Recent):  Temp: 98.4 °F (36.9 °C) (18 1206)  Pulse: 81 (18 1206)  Resp: 18 (18 1206)  BP: (!) 150/95 (18 1206)  SpO2: 99 % (18 1206) Vital Signs (24h Range):  Temp:  [98.2 °F (36.8 °C)-98.7 °F (37.1 °C)] 98.4 °F (36.9 °C)  Pulse:  [74-92] 81  Resp:  [18] 18  SpO2:  [99 %-100 %] 99 %  BP: (116-150)/(74-95) 150/95     Weight: 74.8 kg (165 lb)  Body mass index is 31.18 kg/m².      Intake/Output Summary (Last 24 hours) at 18 1228  Last data filed at 18 0745   Gross per 24 hour   Intake                0 ml   Output             1000 ml   Net            -1000 ml       Significant Labs:  Lab Results   Component Value Date    [facility] ALBARO POS 2018    HEPBSAG Negative 08/15/2017       Recent Labs  Lab 18  0424   HGB 7.0*   HCT 24.6*       CBC:   Recent Labs  Lab 18  0424   WBC 13.21*   RBC 3.21*   HGB 7.0*   HCT  24.6*      MCV 77*   MCH 21.8*   MCHC 28.5*     I have personallly reviewed all pertinent lab results from the last 24 hours.    Physical Exam:   Constitutional: She is oriented to person, place, and time. She appears well-developed and well-nourished. No distress.       Cardiovascular: Normal rate.   Pulse Score: 2+   Pulmonary/Chest: Effort normal.        Abdominal: Soft. Bowel sounds are normal. She exhibits abdominal incision. She exhibits no distension. There is tenderness.   Appropriately tender with incision clean dry and intact                 Neurological: She is alert and oriented to person, place, and time.

## 2018-03-03 NOTE — NURSING
Offered pt to pump for infant in NICU,after pt was requesting information about breastfeeding. Pt stated she did want to pump. Breastfeeding pump and instructions given.

## 2018-03-04 VITALS
SYSTOLIC BLOOD PRESSURE: 134 MMHG | HEIGHT: 61 IN | DIASTOLIC BLOOD PRESSURE: 90 MMHG | BODY MASS INDEX: 31.15 KG/M2 | OXYGEN SATURATION: 99 % | WEIGHT: 165 LBS | RESPIRATION RATE: 18 BRPM | HEART RATE: 80 BPM | TEMPERATURE: 99 F

## 2018-03-04 PROCEDURE — 25000003 PHARM REV CODE 250: Performed by: STUDENT IN AN ORGANIZED HEALTH CARE EDUCATION/TRAINING PROGRAM

## 2018-03-04 PROCEDURE — 25000003 PHARM REV CODE 250: Performed by: OBSTETRICS & GYNECOLOGY

## 2018-03-04 PROCEDURE — 99231 SBSQ HOSP IP/OBS SF/LOW 25: CPT | Mod: ,,, | Performed by: OBSTETRICS & GYNECOLOGY

## 2018-03-04 RX ORDER — OXYCODONE AND ACETAMINOPHEN 5; 325 MG/1; MG/1
1 TABLET ORAL EVERY 4 HOURS PRN
Qty: 14 TABLET | Refills: 0 | Status: SHIPPED | OUTPATIENT
Start: 2018-03-04 | End: 2019-01-29

## 2018-03-04 RX ORDER — IBUPROFEN 600 MG/1
600 TABLET ORAL EVERY 6 HOURS
Qty: 30 TABLET | Refills: 3 | Status: SHIPPED | OUTPATIENT
Start: 2018-03-04 | End: 2019-01-29

## 2018-03-04 RX ADMIN — IBUPROFEN 600 MG: 600 TABLET, FILM COATED ORAL at 12:03

## 2018-03-04 RX ADMIN — DOCUSATE SODIUM 200 MG: 100 CAPSULE, LIQUID FILLED ORAL at 08:03

## 2018-03-04 RX ADMIN — METOPROLOL SUCCINATE 25 MG: 25 TABLET, EXTENDED RELEASE ORAL at 08:03

## 2018-03-04 RX ADMIN — IBUPROFEN 600 MG: 600 TABLET, FILM COATED ORAL at 05:03

## 2018-03-04 RX ADMIN — FERROUS SULFATE TAB EC 325 MG (65 MG FE EQUIVALENT) 325 MG: 325 (65 FE) TABLET DELAYED RESPONSE at 08:03

## 2018-03-04 NOTE — PLAN OF CARE
Problem: Patient Care Overview  Goal: Plan of Care Review  Outcome: Outcome(s) achieved Date Met: 03/04/18  Pt doing well ambulating, voiding and passing gas with intermittent abdominal pain controlled by po medication no distress noted

## 2018-03-04 NOTE — SUBJECTIVE & OBJECTIVE
Hospital course: 2018 - Admitted from [facility] at 35w1d with unrelenting  contractions, abdominal pain, and cervical change, concerning for PTL versus uterine dehiscence in the setting of 3 prior c/s. Decision made to perform RLTCS. Approved per MFM. S/p 1 dose of BMZ in triage. RLTCS uncomplicated, see operative note for details.   2018- POD#1 s/p RLTCS. Doing well, hartmann still in. Meeting other post operative milestones. Denies preE ROS. BP: (127-164)/() 159/87  2018 - POD#2 s/p RLTCS. She is doing well this morning. She is ambulating, voiding and tolerating PO. Infant in NICU doing well. Patient is bottle feeding but attempting to pump.  2018 POD#3. Dong well, meeting post op milestones. Bottle and breast feeding. Denies preEROS. BP: (120-150)/(63-95) 150/80. Plan to discharge home today.     Interval History:   She is doing well this morning. She is tolerating a regular diet without nausea or vomiting. She is voiding spontaneously. She is ambulating. She has not passed flatus, and has not a BM. Vaginal bleeding is moderate. She denies fever or chills. Abdominal pain is moderate and controlled with oral medications. She is attempting breastfeeding.   Objective:     Vital Signs (Most Recent):  Temp: 97.9 °F (36.6 °C) (18 0400)  Pulse: 83 (18 0400)  Resp: 18 (18 040)  BP: (!) 150/80 (18 0400)  SpO2: 98 % (18 040) Vital Signs (24h Range):  Temp:  [97.9 °F (36.6 °C)-98.4 °F (36.9 °C)] 97.9 °F (36.6 °C)  Pulse:  [81-99] 83  Resp:  [18] 18  SpO2:  [98 %-99 %] 98 %  BP: (120-150)/(63-95) 150/80     Weight: 74.8 kg (165 lb)  Body mass index is 31.18 kg/m².      Intake/Output Summary (Last 24 hours) at 18 0454  Last data filed at 18 0745   Gross per 24 hour   Intake                0 ml   Output             1000 ml   Net            -1000 ml       Significant Labs:  Lab Results   Component Value Date    [facility]    HEPBSAG Negative  08/15/2017     No results for input(s): HGB, HCT in the last 48 hours.    CBC: No results for input(s): WBC, RBC, HGB, HCT, PLT, MCV, MCH, MCHC in the last 48 hours.  I have personallly reviewed all pertinent lab results from the last 24 hours.    Physical Exam:   Constitutional: She is oriented to person, place, and time. She appears well-developed and well-nourished. No distress.       Cardiovascular: Normal rate.   Pulse Score: 2+   Pulmonary/Chest: Effort normal.        Abdominal: Soft. Bowel sounds are normal. She exhibits abdominal incision. She exhibits no distension. There is tenderness.   Appropriately tender with incision clean dry and intact                 Neurological: She is alert and oriented to person, place, and time.

## 2018-03-04 NOTE — ASSESSMENT & PLAN NOTE
Postpartum care:  - Patient doing well. Continue routine management and advances.  - Continue PO pain meds. Pain well controlled.  - Encourage ambulation.   - Heme: Pre Delivery h/h 7/25 --> Post Delivery h/h 7/24  - Circumcision - infant in NICU   - Contraception - interested in [facility]  - Lactation - The patient is bottle feeding. Lactation nurse following along PRN  - Rh Status - A pos

## 2018-03-04 NOTE — PROGRESS NOTES
[facility]  Obstetrics  Postpartum Progress Note    Patient Name: Emily Mckeon  MRN: [id number]  Admission Date: 3/1/2018  Hospital Length of Stay: 3 days  Attending Physician: Cong Perez MD  Primary Care Provider: Primary Doctor No    Subjective:     Principal Problem: uterine contractions in third trimester, antepartum    Hospital course: 2018 - Admitted from [facility] at 35w1d with unrelenting  contractions, abdominal pain, and cervical change, concerning for PTL versus uterine dehiscence in the setting of 3 prior c/s. Decision made to perform RLTCS. Approved per [facility]. S/p 1 dose of BMZ in triage. RLTCS uncomplicated, see operative note for details.   2018- POD#1 s/p RLTCS. Doing well, hartmann still in. Meeting other post operative milestones. Denies preE ROS. BP: (127-164)/() 159/87  2018 - POD#2 s/p RLTCS. She is doing well this morning. She is ambulating, voiding and tolerating PO. Infant in NICU doing well. Patient is bottle feeding but attempting to pump.  2018 POD#3. Dong well, meeting post op milestones. Bottle and breast feeding. Denies preEROS. BP: (120-150)/(63-95) 150/80. Plan to discharge home today.     Interval History:   She is doing well this morning. She is tolerating a regular diet without nausea or vomiting. She is voiding spontaneously. She is ambulating. She has not passed flatus, and has not a BM. Vaginal bleeding is moderate. She denies fever or chills. Abdominal pain is moderate and controlled with oral medications. She is attempting breastfeeding.   Objective:     Vital Signs (Most Recent):  Temp: 97.9 °F (36.6 °C) (18 0400)  Pulse: 83 (18 0400)  Resp: 18 (18)  BP: (!) 150/80 (18)  SpO2: 98 % (18) Vital Signs (24h Range):  Temp:  [97.9 °F (36.6 °C)-98.4 °F (36.9 °C)] 97.9 °F (36.6 °C)  Pulse:  [81-99] 83  Resp:  [18] 18  SpO2:  [98 %-99 %] 98 %  BP: (120-150)/(63-95) 150/80      Weight: 74.8 kg (165 lb)  Body mass index is 31.18 kg/m².      Intake/Output Summary (Last 24 hours) at 18 9523  Last data filed at 18 0733   Gross per 24 hour   Intake                0 ml   Output             1000 ml   Net            -1000 ml       Significant Labs:  Lab Results   Component Value Date    [facility] A POS 2018    HEPBSAG Negative 08/15/2017     No results for input(s): HGB, HCT in the last 48 hours.    CBC: No results for input(s): WBC, RBC, HGB, HCT, PLT, MCV, MCH, MCHC in the last 48 hours.  I have personallly reviewed all pertinent lab results from the last 24 hours.    Physical Exam:   Constitutional: She is oriented to person, place, and time. She appears well-developed and well-nourished. No distress.       Cardiovascular: Normal rate.   Pulse Score: 2+   Pulmonary/Chest: Effort normal.        Abdominal: Soft. Bowel sounds are normal. She exhibits abdominal incision. She exhibits no distension. There is tenderness.   Appropriately tender with incision clean dry and intact                 Neurological: She is alert and oriented to person, place, and time.         Assessment/Plan:     37 y.o. female  for:     delivery delivered    Postpartum care:  - Patient doing well. Continue routine management and advances.  - Continue PO pain meds. Pain well controlled.  - Encourage ambulation.   - Heme: Pre Delivery h/h  --> Post Delivery h/h   - Circumcision - infant in NICU   - Contraception - interested in [facility]  - Lactation - The patient is bottle feeding. Lactation nurse following along PRN  - Rh Status - A pos            Iron deficiency anemia secondary to inadequate dietary iron intake    - H/H 7.3/25.6 pre op  -  in surgery  - will start on Fe and scheduled colace        H/O  section x3    - anticipate significant amount of scar tissue given reported history per patient  - 2u pRBC held  - concern for possible uterine dehiscence given  pain level        Chronic hypertension affecting pregnancy    - CHAP trial  - on metoprolol 25mg daily  -BP: (120-150)/(63-95) 150/80  - AST/ALT 18/5  - P/C ratio 0.18  - CBC 7/24/234            Disposition: As patient meets milestones, will plan to discharge today.    Maryam Murillo MD  Obstetrics  [facility]    SEEN AND EXAMINED BY ME  AGREE WITH ABOVE    Bryson Ayers MD

## 2018-03-04 NOTE — ASSESSMENT & PLAN NOTE
- CHAP trial  - on metoprolol 25mg daily  -BP: (120-150)/(63-95) 150/80  - AST/ALT 18/5  - P/C ratio 0.18  - CBC 7/24/234

## 2018-03-04 NOTE — PROGRESS NOTES
[facility]  Obstetrics  Postpartum Progress Note    Patient Name: Emily Mckeon  MRN: [id number]  Admission Date: 3/1/2018  Hospital Length of Stay: 2 days  Attending Physician: Cong Perez MD  Primary Care Provider: Primary Doctor No    Subjective:     Principal Problem: uterine contractions in third trimester, antepartum    Hospital course: 2018 - Admitted from [facility] at 35w1d with unrelenting  contractions, abdominal pain, and cervical change, concerning for PTL versus uterine dehiscence in the setting of 3 prior c/s. Decision made to perform RLTCS. Approved per [facility]. S/p 1 dose of BMZ in triage. RLTCS uncomplicated, see operative note for details.   2018- POD#1 s/p RLTCS. Doing well, hartmann still in. Meeting other post operative milestones. Denies preE ROS. BP: (127-164)/() 159/87  2018 - POD#2 s/p RLTCS. She is doing well this morning. She is ambulating, voiding and tolerating PO. Infant in NICU doing well. Patient is bottle feeding but attempting to pump.    Interval History:   She is doing well this morning. She is tolerating a regular diet without nausea or vomiting. She is voiding spontaneously. She is ambulating. She has not passed flatus, and has not a BM. Vaginal bleeding is moderate. She denies fever or chills. Abdominal pain is moderate and controlled with oral medications. She is attempting breastfeeding.   Objective:     Vital Signs (Most Recent):  Temp: 98.4 °F (36.9 °C) (18 1206)  Pulse: 81 (18 1206)  Resp: 18 (18 1206)  BP: (!) 150/95 (18 1206)  SpO2: 99 % (18 1206) Vital Signs (24h Range):  Temp:  [98.2 °F (36.8 °C)-98.7 °F (37.1 °C)] 98.4 °F (36.9 °C)  Pulse:  [74-92] 81  Resp:  [18] 18  SpO2:  [99 %-100 %] 99 %  BP: (116-150)/(74-95) 150/95     Weight: 74.8 kg (165 lb)  Body mass index is 31.18 kg/m².      Intake/Output Summary (Last 24 hours) at 18 1228  Last data filed at 18 0756   Gross per 24  hour   Intake                0 ml   Output             1000 ml   Net            -1000 ml       Significant Labs:  Lab Results   Component Value Date    GROUPTRL[facility] A POS 2018    HEPBSAG Negative 08/15/2017       Recent Labs  Lab 18  0424   HGB 7.0*   HCT 24.6*       CBC:   Recent Labs  Lab 18  0424   WBC 13.21*   RBC 3.21*   HGB 7.0*   HCT 24.6*      MCV 77*   MCH 21.8*   MCHC 28.5*     I have personallly reviewed all pertinent lab results from the last 24 hours.    Physical Exam:   Constitutional: She is oriented to person, place, and time. She appears well-developed and well-nourished. No distress.       Cardiovascular: Normal rate.   Pulse Score: 2+   Pulmonary/Chest: Effort normal.        Abdominal: Soft. Bowel sounds are normal. She exhibits abdominal incision. She exhibits no distension. There is tenderness.   Appropriately tender with incision clean dry and intact                 Neurological: She is alert and oriented to person, place, and time.         Assessment/Plan:     37 y.o. female  for:     delivery delivered    Postpartum care:  - Patient doing well. Continue routine management and advances.  - Continue PO pain meds. Pain well controlled.  - Encourage ambulation.   - Heme: Pre Delivery h/h  --> Post Delivery h/h   - Circumcision - infant in NICU   - Contraception - interested in [facility]  - Lactation - The patient is bottle feeding. Lactation nurse following along PRN  - Rh Status - A pos            Iron deficiency anemia secondary to inadequate dietary iron intake    - H/H 7.3/25.6 pre op  -  in surgery  - will start on Fe and scheduled colace        H/O  section x3    - anticipate significant amount of scar tissue given reported history per patient  - 2u pRBC held  - concern for possible uterine dehiscence given pain level        Chronic hypertension affecting pregnancy    - CHAP trial  - on metoprolol 25mg daily  - BP:  (127-164)/() 159/87  - AST/ALT 18/5  - P/C ratio 0.18  - CBC 7/24/234            Disposition: As patient meets milestones, will plan to discharge POD#3-4.    Trixie Witt MD  Obstetrics [facility]

## 2018-03-05 LAB
BLD PROD TYP BPU: NORMAL
BLD PROD TYP BPU: NORMAL
BLOOD UNIT EXPIRATION DATE: NORMAL
BLOOD UNIT EXPIRATION DATE: NORMAL
BLOOD UNIT TYPE CODE: 6200
BLOOD UNIT TYPE CODE: 6200
BLOOD UNIT TYPE: NORMAL
BLOOD UNIT TYPE: NORMAL
CODING SYSTEM: NORMAL
CODING SYSTEM: NORMAL
DISPENSE STATUS: NORMAL
DISPENSE STATUS: NORMAL
TRANS ERYTHROCYTES VOL PATIENT: NORMAL ML
TRANS ERYTHROCYTES VOL PATIENT: NORMAL ML

## 2018-03-09 ENCOUNTER — POSTPARTUM VISIT (OUTPATIENT)
Dept: OBSTETRICS AND GYNECOLOGY | Facility: CLINIC | Age: 38
End: 2018-03-09
Payer: MEDICAID

## 2018-03-09 VITALS — DIASTOLIC BLOOD PRESSURE: 102 MMHG | SYSTOLIC BLOOD PRESSURE: 138 MMHG

## 2018-03-09 DIAGNOSIS — I10 CHRONIC HYPERTENSION: Primary | ICD-10-CM

## 2018-03-09 DIAGNOSIS — R03.0 ELEVATED BLOOD PRESSURE READING: ICD-10-CM

## 2018-03-09 DIAGNOSIS — G89.18 POST-OP PAIN: ICD-10-CM

## 2018-03-09 PROCEDURE — 99213 OFFICE O/P EST LOW 20 MIN: CPT | Mod: PBBFAC | Performed by: OBSTETRICS & GYNECOLOGY

## 2018-03-09 PROCEDURE — 99999 PR PBB SHADOW E&M-EST. PATIENT-LVL III: CPT | Mod: PBBFAC,,, | Performed by: OBSTETRICS & GYNECOLOGY

## 2018-03-09 RX ORDER — OXYCODONE AND ACETAMINOPHEN 5; 325 MG/1; MG/1
1 TABLET ORAL EVERY 4 HOURS PRN
Qty: 20 TABLET | Refills: 0 | Status: SHIPPED | OUTPATIENT
Start: 2018-03-09 | End: 2018-03-19

## 2018-03-09 RX ORDER — AMLODIPINE BESYLATE 5 MG/1
5 TABLET ORAL DAILY
Qty: 30 TABLET | Refills: 11 | Status: SHIPPED | OUTPATIENT
Start: 2018-03-09 | End: 2019-07-24

## 2018-03-09 NOTE — PROGRESS NOTES
Ms. Mckeon presents today for BP check.  She reports on and off headaches.  She is taking tylenol and ibuprofen at home.  Patient ran out of her percocet three days ago as well and she is in worsening pain currently around her incision.  Denies spots in her vision, or RUQ pain.      Vitals:    18 1140   BP: (!) 138/102        Gen: NAD  Heart: RRR  Lungs: CTA  Abdomen: incision healing well, appropriately tender, no drainage, erythema, induration or fluctuance  Ext: minimal swelling, reflexes 2+    Assessment: Ms. Mckeon is one week postpartum from [facility].  Hospitalization was complicated by CHTN,  CS due to contractions.  BP elevated today- 150/100 however, patient is uncomfortable today with incisional discomfort.   - BP meds changed back to Amlodipine which she was on prior to pregnancy  - sent for labs  - refilled percocet  - given pre-e precuations  - RTC in 3 days for repeat BP check    Iliana Domínguez

## 2018-03-12 ENCOUNTER — POSTPARTUM VISIT (OUTPATIENT)
Dept: OBSTETRICS AND GYNECOLOGY | Facility: CLINIC | Age: 38
End: 2018-03-12
Payer: MEDICAID

## 2018-03-12 VITALS
HEIGHT: 61 IN | WEIGHT: 160.69 LBS | BODY MASS INDEX: 30.34 KG/M2 | SYSTOLIC BLOOD PRESSURE: 142 MMHG | DIASTOLIC BLOOD PRESSURE: 100 MMHG

## 2018-03-12 DIAGNOSIS — Z01.30 BLOOD PRESSURE CHECK: Primary | ICD-10-CM

## 2018-03-12 PROCEDURE — 99999 PR PBB SHADOW E&M-EST. PATIENT-LVL III: CPT | Mod: PBBFAC,,, | Performed by: OBSTETRICS & GYNECOLOGY

## 2018-03-12 PROCEDURE — 99213 OFFICE O/P EST LOW 20 MIN: CPT | Mod: PBBFAC,25 | Performed by: OBSTETRICS & GYNECOLOGY

## 2018-03-12 PROCEDURE — 99212 OFFICE O/P EST SF 10 MIN: CPT | Mod: S$PBB,,, | Performed by: OBSTETRICS & GYNECOLOGY

## 2018-03-12 NOTE — PROGRESS NOTES
Ms. Mckeon presents today for BP check.  She denies headache, spots in her vision, or RUQ pain.  Her previous headache has resolved.  She is doing well.      Vitals:    03/12/18 1204   BP: (!) 142/100    Repeat with a different cuff: 150/90    Gen: NAD  Heart: RRR  Lungs: CTA  Ext: minimal swelling, reflexes 2+    Assessment:  BP stable today.   - Continue BP meds (switched to Amlodipine)  - RTC for PP visit    Iliana Domínguez

## 2018-03-20 ENCOUNTER — TELEPHONE (OUTPATIENT)
Dept: OBSTETRICS AND GYNECOLOGY | Facility: CLINIC | Age: 38
End: 2018-03-20

## 2018-03-20 NOTE — TELEPHONE ENCOUNTER
Spoke to [employer], patient needs a prior authorization for the paraguard. Representative (vinay) will be faxing over paperwork with instructions on how to start the PA.

## 2018-03-20 NOTE — DISCHARGE SUMMARY
[facility]  Obstetrics  Discharge Summary      Patient Name: Emily Mckeon  MRN: [id number]  Admission Date: 3/1/2018  Hospital Length of Stay: 3 days  Discharge Date and Time:  2018 2:00 PM  Attending Physician: No att. providers found   Discharging Provider: Maryam Murillo MD  Primary Care Provider: Primary Doctor Manjula    HPI: Emily Mckeon is a 37 y.o. [id number] at 35w1d presented to [facility] complaining of very painful uterine contractions for most of the day.     This IUP is complicated by CHTN (no meds, CHAP trial), hx of 3 prior c/s (significant scar tissue per patient), HLD, migraines, fibroid uterus, anemia.    Patient reports contractions have become more painful and more frequent throughout the day. Pain is 9/10. States she continues to have pain even when she is not jimbo. States she is breathing hard and unable to sit still secondary to the pain.  Denies vaginal bleeding, denies LOF.   Fetal Movement: normal.  Patient denies headache, scotoma, RUQ pain, N/V, CP, SOB.      Procedure(s) (LRB):  DELIVERY- SECTION (N/A)     Hospital Course:   2018 - Admitted from [facility] at 35w1d with unrelenting  contractions, abdominal pain, and cervical change, concerning for PTL versus uterine dehiscence in the setting of 3 prior c/s. Decision made to perform RLTCS. Approved per [facility]. S/p 1 dose of BMZ in triage. RLTCS uncomplicated, see operative note for details.   2018- POD#1 s/p RLTCS. Doing well, hartmann still in. Meeting other post operative milestones. Denies preE ROS. BP: (127-164)/() 159/87  2018 - POD#2 s/p RLTCS. She is doing well this morning. She is ambulating, voiding and tolerating PO. Infant in NICU doing well. Patient is bottle feeding but attempting to pump.  2018 POD#3. Dong well, meeting post op milestones. Bottle and breast feeding. Denies preEROS. BP: (120-150)/(63-95) 150/80. Plan to discharge home today.         Final Active Diagnoses:     Diagnosis Date Noted POA     delivery delivered [O82] 2018 Unknown    Iron deficiency anemia secondary to inadequate dietary iron intake [D50.8] 2017 Yes    Chronic hypertension affecting pregnancy [O10.919] 2017 Yes    H/O  section x3 [Z98.891] 2017 Not Applicable      Problems Resolved During this Admission:    Diagnosis Date Noted Date Resolved POA    PRINCIPAL PROBLEM:   uterine contractions in third trimester, antepartum [O47.03] 2018 Yes     contractions [O47.9] 2018 Unknown    High-risk pregnancy in second trimester [O09.92] 2017 Not Applicable        Labs: CBC No results for input(s): WBC, HGB, HCT, PLT in the last 48 hours.    Feeding Method: both breast and bottle    Immunizations     None          Delivery:    Episiotomy:     Lacerations:     Repair suture:     Repair # of packets: 11   Blood loss (ml): 0     Birth information:  YOB: 2020   Time of birth: 9:10 PM   Sex: male   Delivery type: , Low Transverse   Gestational Age: 35w1d    Delivery Clinician:      Other providers:       Additional  information:  Forceps:    Vacuum:    Breech:    Observed anomalies      Living?:           APGARS  One minute Five minutes Ten minutes   Skin color:         Heart rate:         Grimace:         Muscle tone:         Breathing:         Totals: 8  9        Placenta: Delivered:       appearance    Pending Diagnostic Studies:     None          Discharged Condition: good    Disposition: Home or Self Care    Follow Up:  Follow-up Information     Iliana Domínguez MD. Schedule an appointment as soon as possible for a visit in 1 week.    Specialty:  Obstetrics and Gynecology  Why:  blood pressure check  Contact information:  [address] [facility] [address]  [phone]             Iliana Domínguez MD. Schedule an appointment as soon as possible for a visit in 6 weeks.     Specialty:  Obstetrics and Gynecology  Why:  post partum visit  Contact information:  [address] [facility] [address]  [phone]                 Patient Instructions:     Diet Adult Regular     Activity as tolerated     No driving until:   Order Comments: Not taking narcotic medications     Other restrictions (specify):   Order Comments: Nothing in vagina till seen by MD  No tub baths     Notify your health care provider if you experience any of the following:  temperature >100.4     Notify your health care provider if you experience any of the following:  persistent nausea and vomiting or diarrhea     Notify your health care provider if you experience any of the following:  redness, tenderness, or signs of infection (pain, swelling, redness, odor or green/yellow discharge around incision site)     Notify your health care provider if you experience any of the following:  severe uncontrolled pain     Notify your health care provider if you experience any of the following:  difficulty breathing or increased cough     Notify your health care provider if you experience any of the following:  severe persistent headache     Notify your health care provider if you experience any of the following:  worsening rash     Notify your health care provider if you experience any of the following:  persistent dizziness, light-headedness, or visual disturbances     Notify your health care provider if you experience any of the following:  increased confusion or weakness     Notify your health care provider if you experience any of the following:   Order Comments: Bleeding through 2 pads/hr for 2 hours       Medications:  Discharge Medication List as of 3/4/2018  8:48 AM      START taking these medications    Details   ibuprofen (ADVIL,MOTRIN) 600 MG tablet Take 1 tablet (600 mg total) by mouth every 6 (six) hours., Starting Sun 3/4/2018, Normal      oxyCODONE-acetaminophen (PERCOCET) 5-325 mg per tablet Take 1 tablet by mouth  every 4 (four) hours as needed., Starting Sun 3/4/2018, Print         CONTINUE these medications which have NOT CHANGED    Details   ACETAMINOPHEN (TYLENOL ORAL) Take by mouth., Historical Med      aspirin 81 MG Chew Take 81 mg by mouth once daily., Historical Med      cetirizine (ZYRTEC) 10 MG tablet Take 1 tablet (10 mg total) by mouth once daily., Starting Tue 10/3/2017, Until Wed 10/3/2018, Print      ferrous sulfate 325 mg (65 mg iron) Tab tablet Take 1 tablet (325 mg total) by mouth once daily., Starting Mon 11/27/2017, Until Tue 11/27/2018, Normal      fluticasone (FLONASE) 50 mcg/actuation nasal spray 1 spray by Each Nare route 2 (two) times daily as needed., Starting Tue 10/3/2017, Print      magnesium oxide (MAGOX) 400 mg tablet Take 1 tablet (400 mg total) by mouth once daily., Starting Fri 12/22/2017, Until Sat 12/22/2018, Normal      metoprolol succinate (TOPROL-XL) 25 MG 24 hr tablet Take 1 tablet (25 mg total) by mouth once daily., Starting Sat 12/2/2017, Until Sun 12/2/2018, Normal             Maryam Murillo MD  Obstetrics [facility]    SEEN AND EXAMINED BY ME  AGREE WITH ABOVE    Bryson Ayers MD

## 2018-03-22 ENCOUNTER — PATIENT MESSAGE (OUTPATIENT)
Dept: OBSTETRICS AND GYNECOLOGY | Facility: CLINIC | Age: 38
End: 2018-03-22

## 2018-04-09 ENCOUNTER — PATIENT MESSAGE (OUTPATIENT)
Dept: OBSTETRICS AND GYNECOLOGY | Facility: CLINIC | Age: 38
End: 2018-04-09

## 2018-04-10 ENCOUNTER — TELEPHONE (OUTPATIENT)
Dept: OBSTETRICS AND GYNECOLOGY | Facility: CLINIC | Age: 38
End: 2018-04-10

## 2018-04-10 ENCOUNTER — TELEPHONE (OUTPATIENT)
Dept: RESEARCH | Facility: HOSPITAL | Age: 38
End: 2018-04-10

## 2018-04-10 NOTE — TELEPHONE ENCOUNTER
----- Message from Brennon Mendez sent at 4/10/2018  9:42 AM CDT -----  Name of Who is Calling: NATHAN HUMPHREY [[id number]]      What is the request in detail: Pt is calling to schedule 6 week pp appt. Please call to schedule.      Can the clinic reply by [facility]: yes      What Number to Call Back if not in [facility]: [phone]

## 2018-04-25 ENCOUNTER — RESEARCH ENCOUNTER (OUTPATIENT)
Dept: RESEARCH | Facility: HOSPITAL | Age: 38
End: 2018-04-25

## 2018-04-25 ENCOUNTER — POSTPARTUM VISIT (OUTPATIENT)
Dept: OBSTETRICS AND GYNECOLOGY | Facility: CLINIC | Age: 38
End: 2018-04-25
Payer: MEDICAID

## 2018-04-25 ENCOUNTER — TELEPHONE (OUTPATIENT)
Dept: OBSTETRICS AND GYNECOLOGY | Facility: CLINIC | Age: 38
End: 2018-04-25

## 2018-04-25 VITALS
DIASTOLIC BLOOD PRESSURE: 98 MMHG | SYSTOLIC BLOOD PRESSURE: 122 MMHG | WEIGHT: 163.13 LBS | HEIGHT: 61 IN | BODY MASS INDEX: 30.8 KG/M2

## 2018-04-25 DIAGNOSIS — Z30.014 ENCOUNTER FOR INITIAL PRESCRIPTION OF INTRAUTERINE CONTRACEPTIVE DEVICE (IUD): ICD-10-CM

## 2018-04-25 LAB
B-HCG UR QL: NEGATIVE
CTP QC/QA: YES

## 2018-04-25 PROCEDURE — 99999 PR PBB SHADOW E&M-EST. PATIENT-LVL III: CPT | Mod: PBBFAC,,, | Performed by: OBSTETRICS & GYNECOLOGY

## 2018-04-25 PROCEDURE — 58300 INSERT INTRAUTERINE DEVICE: CPT | Mod: ,,, | Performed by: OBSTETRICS & GYNECOLOGY

## 2018-04-25 PROCEDURE — 81025 URINE PREGNANCY TEST: CPT | Mod: PBBFAC | Performed by: OBSTETRICS & GYNECOLOGY

## 2018-04-25 PROCEDURE — 99213 OFFICE O/P EST LOW 20 MIN: CPT | Mod: PBBFAC | Performed by: OBSTETRICS & GYNECOLOGY

## 2018-04-25 RX ADMIN — COPPER 1 EACH: 313.4 INTRAUTERINE DEVICE INTRAUTERINE at 12:04

## 2018-04-25 NOTE — TELEPHONE ENCOUNTER
Spoke with [employer] rep that stated this patient was covered at 100% through major medical benefits with no copay. Will proceed with Buy and Bill for patient as approved per [facility].

## 2018-04-25 NOTE — PROGRESS NOTES
Subjective:       Patient ID: Emily Mckeon is a 37 y.o. female.    Chief Complaint:  Postpartum Care      History of Present Illness       Emily Mckeon is a 37 y.o. female  presents for a postpartum visit.  She is status post RLTCS 6 weeks ago.  Her hospitalization was complicated by  labor.  She is not breastfeeding.  She desires IUD for contraception.  She denies postpartum depression.    Patients BP is still elevated.  Saw her PCP two weeks ago and norvasc was increased to 10 mg.  She came back a week later for BP check in the PCP office.  Reports it was still high at that visit.  She has follow up with them on May 11.   Her doctor Dr. Parsons in [facility].      Patient is not having headaches, no spots in her vision, no pain under right breast.      Her last pap was 2017, normal per patient    Review of Systems  Review of Systems   Constitutional: Negative for appetite change.   Eyes: Negative for visual disturbance.   Respiratory: Negative for cough and shortness of breath.    Cardiovascular: Negative for chest pain.   Gastrointestinal: Negative for abdominal pain, constipation and diarrhea.   Genitourinary: Negative for difficulty urinating, dysuria, frequency, genital sores, pelvic pain, vaginal bleeding, vaginal discharge and vaginal pain.   Neurological: Negative for dizziness, light-headedness and headaches.   Psychiatric/Behavioral: Negative for dysphoric mood.           Objective:    Physical Exam   Constitutional: She is oriented to person, place, and time. She appears well-developed and well-nourished.   Pulmonary/Chest: Effort normal.   Abdominal: Soft.   Genitourinary: Vagina normal and uterus normal. No labial fusion. There is no rash, tenderness, lesion or injury on the right labia. There is no rash, tenderness, lesion or injury on the left labia. Uterus is not deviated, not enlarged, not fixed and not tender. Cervix exhibits no motion tenderness, no discharge and no  friability. Right adnexum displays no mass, no tenderness and no fullness. Left adnexum displays no mass, no tenderness and no fullness. No erythema, tenderness or bleeding in the vagina. No foreign body in the vagina. No signs of injury around the vagina. No vaginal discharge found.   Neurological: She is alert and oriented to person, place, and time.   Psychiatric: She has a normal mood and affect. Her behavior is normal. Judgment and thought content normal.   Nursing note and vitals reviewed.       Chaperoned by: Dora     Assessment:        1. Postpartum state    2. Encounter for initial prescription of intrauterine contraceptive device (IUD)               Plan:        Orders Placed This Encounter   Procedures    POCT urine pregnancy       Medications Ordered This Encounter      copper IUD 1 each    Breastfeeding: no  Postpartum Depression: no  Contraception: copper IUD  Pap smear: up to date    Follow up for annual exam    Iliana Domínguez MD  [facility]  [phone]

## 2018-04-25 NOTE — PROCEDURES
Procedures     Date: 04/25/2018  Time: 1100    Procedure:  ParaGard insertion    Patient Consent: The patient was counseled on the risks, benefits, indications, and alternatives to IUD use.  She understands that with insertion there is a risk of bleeding, infection, and uterine perforation requiring additional surgery or procedures for retrieval of device.  All questions are answered.  Consents signed.     Labs: UPT is negative.              Cervical cultures were not performed.    Anesthesia: NONE    Procedure note: Time out performed.  The cervix was visualized with a speculum.  A single tooth tenaculum was placed on the anterior lip of the cervix.  The uterus sounded to 8 cm.    In a sterile fashion, a ParaGard was placed high in the uterine fundus without difficulty.  The string were then cut.  The tenaculum and speculum were removed.    Complications: None    Patient disposition: The patient tolerated the procedure well.    Assessment:  1.  Contraceptive management/IUD insertion    Post IUD placement counseling:  Manage post IUD placement pain with NSAIDS, Tylenol or Rx per Medcard.  IUD danger signs and how to check for strings were discussed.  Advised to use back up contraception for one week for the Mirena/Juany, none required for the ParaGard.  Removal date: 3 years for the Juany, 5 years for Mirena, 10 years for ParaGard.    Follow up:  4 weeks for string check      Iliana Domínguez MD 04/25/2018 12:54 PM

## 2018-04-25 NOTE — PROGRESS NOTES
2014.288.C [facility] postpartum visit    I met with Ms. Mckeon this morning in the Ob clinic. Her clinic BP was elevated 158/92 and repeat 122/98. She is currently taking amlodipine 10mg/day, recently changed by her PCP. She has another follow up visit with her PCP next month. She denies any unplanned visits or ED visits or illnesses. She states that she never forgets her medications. She confirmed that her baby has only had 1 ED visit, which is in Epic. I reminded her that funds will be added to her ClinCard and this is the last study visit. She had no questions.

## 2018-05-11 ENCOUNTER — PATIENT MESSAGE (OUTPATIENT)
Dept: OBSTETRICS AND GYNECOLOGY | Facility: CLINIC | Age: 38
End: 2018-05-11

## 2018-05-16 ENCOUNTER — PATIENT MESSAGE (OUTPATIENT)
Dept: OBSTETRICS AND GYNECOLOGY | Facility: CLINIC | Age: 38
End: 2018-05-16

## 2018-05-21 ENCOUNTER — TELEPHONE (OUTPATIENT)
Dept: OBSTETRICS AND GYNECOLOGY | Facility: CLINIC | Age: 38
End: 2018-05-21

## 2018-05-23 ENCOUNTER — TELEPHONE (OUTPATIENT)
Dept: OBSTETRICS AND GYNECOLOGY | Facility: CLINIC | Age: 38
End: 2018-05-23

## 2018-05-23 ENCOUNTER — PATIENT MESSAGE (OUTPATIENT)
Dept: OBSTETRICS AND GYNECOLOGY | Facility: CLINIC | Age: 38
End: 2018-05-23

## 2018-06-09 ENCOUNTER — HOSPITAL ENCOUNTER (EMERGENCY)
Facility: HOSPITAL | Age: 38
Discharge: HOME | End: 2018-06-09
Attending: EMERGENCY MEDICINE
Payer: MEDICAID

## 2018-06-09 VITALS
RESPIRATION RATE: 16 BRPM | OXYGEN SATURATION: 100 % | HEIGHT: 61 IN | SYSTOLIC BLOOD PRESSURE: 164 MMHG | TEMPERATURE: 98 F | HEART RATE: 66 BPM | DIASTOLIC BLOOD PRESSURE: 92 MMHG

## 2018-06-09 DIAGNOSIS — M25.511 RIGHT SHOULDER PAIN, UNSPECIFIED CHRONICITY: Primary | ICD-10-CM

## 2018-06-09 DIAGNOSIS — M25.519 SHOULDER PAIN: ICD-10-CM

## 2018-06-09 LAB
B-HCG UR QL: NEGATIVE
CTP QC/QA: YES

## 2018-06-09 PROCEDURE — 96372 THER/PROPH/DIAG INJ SC/IM: CPT

## 2018-06-09 PROCEDURE — 99284 EMERGENCY DEPT VISIT MOD MDM: CPT | Mod: 25

## 2018-06-09 PROCEDURE — 99283 EMERGENCY DEPT VISIT LOW MDM: CPT | Mod: ,,, | Performed by: PHYSICIAN ASSISTANT

## 2018-06-09 PROCEDURE — 63600175 PHARM REV CODE 636 W HCPCS: Performed by: PHYSICIAN ASSISTANT

## 2018-06-09 PROCEDURE — 81025 URINE PREGNANCY TEST: CPT | Performed by: PHYSICIAN ASSISTANT

## 2018-06-09 RX ORDER — NAPROXEN 500 MG/1
500 TABLET ORAL 2 TIMES DAILY WITH MEALS
Qty: 20 TABLET | Refills: 0 | Status: SHIPPED | OUTPATIENT
Start: 2018-06-09 | End: 2018-06-19

## 2018-06-09 RX ORDER — ORPHENADRINE CITRATE 30 MG/ML
60 INJECTION INTRAMUSCULAR; INTRAVENOUS
Status: COMPLETED | OUTPATIENT
Start: 2018-06-09 | End: 2018-06-09

## 2018-06-09 RX ORDER — AMLODIPINE BESYLATE 5 MG/1
5 TABLET ORAL
Status: DISCONTINUED | OUTPATIENT
Start: 2018-06-09 | End: 2018-06-09 | Stop reason: HOSPADM

## 2018-06-09 RX ORDER — KETOROLAC TROMETHAMINE 30 MG/ML
15 INJECTION, SOLUTION INTRAMUSCULAR; INTRAVENOUS
Status: COMPLETED | OUTPATIENT
Start: 2018-06-09 | End: 2018-06-09

## 2018-06-09 RX ORDER — CYCLOBENZAPRINE HCL 5 MG
5 TABLET ORAL 3 TIMES DAILY PRN
Qty: 9 TABLET | Refills: 0 | Status: SHIPPED | OUTPATIENT
Start: 2018-06-09 | End: 2019-01-29

## 2018-06-09 RX ADMIN — ORPHENADRINE CITRATE 60 MG: 30 INJECTION INTRAMUSCULAR; INTRAVENOUS at 02:06

## 2018-06-09 RX ADMIN — KETOROLAC TROMETHAMINE 15 MG: 30 INJECTION, SOLUTION INTRAMUSCULAR at 02:06

## 2018-06-09 NOTE — ED NOTES
LOC: The patient is awake, alert, and oriented to place, time, situation. Affect is appropriate.  Speech is appropriate and clear.     APPEARANCE: Patient resting comfortably in no acute distress.  Patient is clean and well groomed.    SKIN: The skin is warm and dry; color consistent with ethnicity.  Patient has normal skin turgor and moist mucus membranes.  Skin intact; no breakdown or bruising noted.     MUSCULOSKELETAL: Patient moving lower extremities without difficulty.  Pt constantly moving right arm to see if any position makes it feel better.  Denies weakness.     RESPIRATORY: Airway is open and patent. Respirations spontaneous, even, easy, and non-labored.  Patient has a normal effort and rate.  No accessory muscle use noted. Denies cough.     CARDIAC: No peripheral edema noted. No complaints of chest pain.      ABDOMEN: Soft and non tender to palpation.  No distention noted.     NEUROLOGIC: Eyes open spontaneously.  Behavior appropriate to situation.  Follows commands; facial expression symmetrical.  Purposeful motor response noted; normal sensation in all extremities. No tingling or numbness reported.

## 2018-06-09 NOTE — DISCHARGE INSTRUCTIONS
Use ice or heat pack over the right shoulder. Use medication as prescribed. Follow up with your family doctor regarding your elevated blood pressure.

## 2018-06-09 NOTE — ED PROVIDER NOTES
Encounter Date: 2018       History     Chief Complaint   Patient presents with    Neck Pain     going to my r shoulder     Patient is a 37-year-old female presenting to the ER for evaluation of neck and shoulder pain. Patient states that this has been ongoing for the last few months but worse in the last 2 days.  She states that she has a continuously move her right shoulder did get some comfort.  No radiation of pain. Denies any paresthesia or focal weakness.  No injury or trauma. She does states that the pain radiates into the right side of her neck.  She has been taking Ultram with no alleviation of her symptoms. No redness or swelling to the site.  No chest pain, palpitations or shortness of breath.      The history is provided by the patient.     Review of patient's allergies indicates:   Allergen Reactions    Demerol [meperidine] Hives    Morphine Hives    Pcn [penicillins]      Past Medical History:   Diagnosis Date    Chronic back pain     Elevated cholesterol     Hypertension     Impaired glucose in pregnancy, antepartum     Migraine      Past Surgical History:   Procedure Laterality Date     SECTION, CLASSIC      x4; all at term    LIPOSUCTION W/ FAT INJECTION       Family History   Problem Relation Age of Onset    Breast cancer Neg Hx     Colon cancer Neg Hx     Ovarian cancer Neg Hx      Social History   Substance Use Topics    Smoking status: Never Smoker    Smokeless tobacco: Never Used    Alcohol use No     Review of Systems   Constitutional: Negative for chills and fever.   HENT: Negative for congestion.    Respiratory: Negative for cough and shortness of breath.    Cardiovascular: Negative for chest pain and palpitations.   Musculoskeletal: Positive for arthralgias and myalgias. Negative for joint swelling.   Skin: Negative for wound.   Allergic/Immunologic: Negative for immunocompromised state.   Neurological: Negative for weakness and numbness.   Hematological: Does  not bruise/bleed easily.   Psychiatric/Behavioral: Negative for confusion.       Physical Exam     Initial Vitals [06/09/18 1328]   BP Pulse Resp Temp SpO2   (!) 191/88 78 18 97.8 °F (36.6 °C) 100 %      MAP       122.33         Physical Exam    Constitutional: She appears well-developed and well-nourished. She is not diaphoretic. No distress.   HENT:   Head: Normocephalic and atraumatic.   Eyes: Conjunctivae and EOM are normal. Pupils are equal, round, and reactive to light.   Neck: Normal range of motion and full passive range of motion without pain. Neck supple. Muscular tenderness (right paraspinal ) present. No spinous process tenderness present.   Tenderness over the right trapezius   Cardiovascular: Normal rate, regular rhythm, normal heart sounds and intact distal pulses.   Pulmonary/Chest: Breath sounds normal.   Musculoskeletal:        Right shoulder: She exhibits tenderness. She exhibits no bony tenderness, no swelling and no deformity.   Diffuse tenderness on palpation over the anterior and posterior right shoulder.  Range of motion of the shoulder fully intact. Strength equal bilaterally.  Distal pulses intact.  Sensation intact. No erythema or swelling noted.   Neurological: She is alert and oriented to person, place, and time.   Skin: Skin is warm and dry.         ED Course   Procedures  Labs Reviewed   POCT URINE PREGNANCY          X-Ray Shoulder Complete 2 View Right   Final Result      No acute displaced fracture-dislocation identified.         Electronically signed by: Ramon Nina MD   Date:    06/09/2018   Time:    14:28                 APC / Resident Notes:   Patient was seen in the ER promptly upon arrival.  She is afebrile, no acute distress. Physical examination reveals diffuse tenderness on palpation over the right shoulder and trapezius.  Tenderness over the right paraspinal muscle of neck as well. No midline tenderness.  X-ray of the shoulder obtained.  Patient given Norflex and Toradol  in ED    X-ray of the shoulder reveals minimal degenerative changes to the right AC joint.  Otherwise unremarkable.  Pain likely secondary to tendonitis. She will be prescribed home on naproxen and Flexeril to use as directed.  Advised to use a heat pack over the neck and shoulder.  Advised to reduce strenuous activity or heavy lifting.  Follow up with family doctor for further management of this chronic pain.    The care of this patient was overseen by attending physician who agrees with treatment, plan, and disposition.                   Clinical Impression:   The primary encounter diagnosis was Right shoulder pain, unspecified chronicity. A diagnosis of Shoulder pain was also pertinent to this visit.      Disposition:   Disposition: Discharged  Condition: Stable                        Diane Dleong PA-C  06/09/18 1530

## 2018-09-17 ENCOUNTER — TELEPHONE (OUTPATIENT)
Dept: OPHTHALMOLOGY | Facility: CLINIC | Age: 38
End: 2018-09-17

## 2018-12-11 DIAGNOSIS — M65.4 DE QUERVAIN'S TENOSYNOVITIS: ICD-10-CM

## 2018-12-11 DIAGNOSIS — G56.01 CARPAL TUNNEL SYNDROME ON RIGHT: Primary | ICD-10-CM

## 2018-12-20 ENCOUNTER — NURSE TRIAGE (OUTPATIENT)
Dept: ADMINISTRATIVE | Facility: CLINIC | Age: 38
End: 2018-12-20

## 2018-12-21 NOTE — TELEPHONE ENCOUNTER
-been having a cyst in middle of breast for about 3 years  -but has been getting bigger  -now is having pus coming out of it that is foul smelling  -has been leaking pus since monday    Reason for Disposition   Boil > 1/2 inch across (> 12 mm; larger than a marble)    Protocols used: ST BOIL (SKIN ABSCESS)-A-

## 2019-01-02 ENCOUNTER — DOCUMENTATION ONLY (OUTPATIENT)
Dept: REHABILITATION | Facility: HOSPITAL | Age: 39
End: 2019-01-02

## 2019-01-03 NOTE — PROGRESS NOTES
Pt. Was a no-show to initial evaluation this date. Pt. Was unable to be reached by phone to reschedule appointment.     APRIL Orozco, OTR/L, CHT   Occupational therapist, Certified Hand Therapist

## 2019-01-08 ENCOUNTER — CLINICAL SUPPORT (OUTPATIENT)
Dept: REHABILITATION | Facility: HOSPITAL | Age: 39
End: 2019-01-08
Payer: MEDICAID

## 2019-01-08 DIAGNOSIS — M25.532 ARTHRALGIA OF LEFT WRIST: ICD-10-CM

## 2019-01-08 DIAGNOSIS — M62.81 MUSCLE WEAKNESS: ICD-10-CM

## 2019-01-08 DIAGNOSIS — M25.60 STIFFNESS IN JOINT: ICD-10-CM

## 2019-01-08 PROCEDURE — 97165 OT EVAL LOW COMPLEX 30 MIN: CPT | Mod: PN

## 2019-01-09 NOTE — PLAN OF CARE
[facility] Occupational Therapy  Initial Evaluation     Date: 2019  Name: Emily Mckeon  Clinic Number: [id number]    Therapy Diagnosis:   Encounter Diagnoses   Name Primary?    Muscle weakness     Stiffness in joint     Arthralgia of left wrist      Physician: Seda Greene MD    Physician Orders: Maria Elena and tx  Medical Diagnosis: Carpal Tunnel Syndrome R, Dequervains Tenosynovitis  Surgical Procedure and Date: 18  Evaluation Date: 2019  Insurance Authorization Period Expiration: 19  Plan of Care Certification Period: 3/8/19  Date of Return to MD: 19    Visit # / Visits authorized:   Time In:5:00 pm  Time Out: 5:45 pm  Total Billable Time: 45 minutes    Precautions:  Standard and Weightbearing    Subjective     Involved Side: L  Dominant Side: Right  Date of Onset: 18  Mechanism of Injury: 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    Emily Mckeon  has a past surgical history that includes Liposuction w/ fat injection and  section, classic.    Emily has a current medication list which includes the following prescription(s): amlodipine, cyclobenzaprine, hydrocodone-acetaminophen, ibuprofen, ondansetron, oxycodone-acetaminophen, sulfamethoxazole-trimethoprim 800-160mg, and tramadol.    Review of patient's allergies indicates:   Allergen Reactions    Demerol [meperidine] Hives    Morphine Hives    Pcn [penicillins]         Patient's Goals for Therapy: Reduce numbness and tingling and increase her strength.      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 IND    Current FunctionalStatus   Home/Living environment : lives with their family      Limitation of Functional Status as follows:   ADLs/IADLs:     - Feeding: IND    - Bathing: IND    - Dressing/Grooming: IND    - Driving: IND     Leisure: More affects on holding her 10 month old son        Objective     Observation/Appearance: Noted swelling, MD took out the stitches today.     Edema. Measured in centimeters.   1/8/2019 1/8/2019    Left Right   Wrist Crease 16.8    Figure of 8 20    MCPs 19      Edema. Measured in centimeters.   1/8/2019 1/8/2019    Left Right   Thumb:     Prox. Phalanx 6.6    IP 6    Distal Phalanx 6.6      Elbow and Wrist ROM. Measured in degrees.   1/8/2019 1/8/2019    Left Right   Elbow Ext/Flex     Supination/Pronation     Wrist Ext/Flex 75/65    Wrist RD/UD 15/16      Hand ROM. Measured in degrees.   1/8/2019 1/8/2019    Left Right             Thumb: MP 61                 IP 68        Rad ADD/ABD 65        Pal ADD/ABD 60           Opposition To DPC       Strength (Dynamometer) and Pinch Strength (Pinch Gauge)  Measured in pounds.   1/8/2019 1/8/2019    Left Right   Rung II NT NT   Key Pinch NT NT   3pt Pinch NT NT   2pt Pinch NT NT     Sensation   1/8/2019 1/8/2019    Left Right   [place]     Normal 1.65-2.83 TBD    Diminished Light Touch 3.22-3.61 TBD    Diminished Protective 3.84-4.31 TBD    Loss of Protective 4.56-6.65 TBD    Untestable >6.65  TBD      Manual Muscle Test   1/8/2019 1/8/2019    Left Right   Wrist Extension  NT NT   Wrist Flexion NT NT   Radial Deviation Nt NT   Ulnar Deviation NT NT   Supination NT NT   Pronation NT NT   Elbow Extension NT NT   Elbow Flexion NT NT       CMS Impairment/Limitation/Restriction for FOTO UE Hand Survey    Therapist reviewed FOTO scores for Emily Mckeon on 1/8/2019.   FOTO documents entered into [employer] - see Media section.    Limitation Score: 34%  Category: Self Care    Current : CJ = at least 20% but < 40% impaired, limited or restricted  Goal: CI = at least 1% but < 20% impaired, limited or restricted  Discharge:         Treatment     Treatment Time In: 0  Treatment Time Out: 0  Total Treatment time separate from Evaluation time:0    Emily received the following supervised modalities after being cleared for contradictions for 10 minutes:   -MHP while HEP education provided    Emily received therapeutic exercises for 10 minutes including:  -HEP review    Emily participated in dynamic functional therapeutic activities to improve functional performance for   minutes, including:  -    Home Exercise Program/Education:  Issued HEP (see patient instructions in EMR) and educated on modality use for pain management . Exercises were reviewed and Emily was able to demonstrate them prior to the end of the session.   Pt received a written copy of exercises to perform at home. Emily demonstrated fair  understanding of the education provided.  Pt was advised to perform these exercises free of pain, and to stop performing them if pain occurs.    Patient/Family Education: role of OT, goals for OT, scheduling/cancellations - pt verbalized understanding. Discussed insurance limitations with patient.    Additional Education provided: Pt provided with written instructions, demonstration and education of HEP with pt demonstrating and verbalizing understanding of appropriate movements and techniques to increase strength, ROM and  activity tolerance for increased IND.      Assessment     Emily Mckeon is a 38 y.o. female referred to outpatient occupational therapy and presents with a medical diagnosis of DeQuervains, resulting in decreased strength and functional use of the hand and demonstrates limitations as described in the chart below. Following medical record review it is determined that pt will benefit from occupational therapy services in order to maximize pain free and/or functional use of left hand. The following goals were discussed with the patient and patient is in agreement with them as to be addressed in the treatment plan. The patient's rehab potential is Good.     Anticipated barriers to occupational therapy: Scheduling  Pt has no cultural, educational or language barriers to learning provided.    Profile and History Assessment of Occupational Performance Level of Clinical Decision Making Complexity Score   Occupational Profile:   mEily Mckeon is a 38 y.o. 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 Emily Mckeon has difficulty with  feeding, bathing, grooming and dressing  finance management, driving/transportation management and shopping  affecting his/her daily functional abilities. His/her main goal for therapy is increase sensation and strength.     Comorbidities:   young age    Medical and Therapy History Review:   Brief               Performance Deficits    Physical:  Joint Mobility  Joint Stability  Muscle Power/Strength  Muscle Endurance  Edema  Control of Voluntary Movement   Strength  Pinch Strength  Pain    Cognitive:  No Deficits    Psychosocial:    Habits  Routines  Rituals     Clinical Decision Making:  low    Assessment Process:  Problem-Focused Assessments    Modification/Need for Assistance:  Minimal-Moderate Modifications/Assistance    Intervention Selection:  Several Treatment Options       low  Based on PMHX, co morbidities , data from assessments and  functional level of assistance required with task and clinical presentation directly impacting function.         Goals:   The following goals were discussed with the patient and patient is in agreement with them as to be addressed in the treatment plan.   Long Term Goals (LTGs); to be met by discharge.  LTG #1: Pt will report a pain level of 3/10 out of 10 with L hand and wrist use during ADLs per pt report   LTG #2: Pt will demo improved FOTO score by 20 points.   LTG #3: Pt will return to prior level of function for ADLs and household management.     Short Term Goals (STGs); to be met within 4 weeks (2/5/19).  STG #1a: Pt will report 6 out of 10 pain level with L hand use during functional ADLs per pt report. .  STG #2a: Pt will report/demo [place] with picking up her 10 month old son with no pain by 4 weeks. .  STG #2b: Pt will report/demo [place] with buttoning and fastening fasteners 100% for UB drsg by 4 weeks. .  STG #3a: Pt will demonstrate independence with issued HEP.   STG #3b: Pt will demo improved SOTO to WNL needed to aid with self care and UB drsg by 4 weeks. .    Plan   Certification Period/Plan of care expiration: 1/8/2019 to 3/8/19.    Outpatient Occupational Therapy 1 times weekly for 8 weeks to include the following interventions: Paraffin, Fluidotherapy, Manual therapy/joint mobilizations, Modalities for pain management, Therapeutic exercises/activities., Strengthening, Orthotic Fabrication/Fit/Training, Edema Control, Scar Management, Electrical Modalities, Joint Protection and Energy Conservation.      Darek Sosa, OT

## 2019-01-29 ENCOUNTER — OFFICE VISIT (OUTPATIENT)
Dept: OBSTETRICS AND GYNECOLOGY | Facility: CLINIC | Age: 39
End: 2019-01-29
Payer: MEDICAID

## 2019-01-29 VITALS — SYSTOLIC BLOOD PRESSURE: 144 MMHG | WEIGHT: 168 LBS | BODY MASS INDEX: 31.74 KG/M2 | DIASTOLIC BLOOD PRESSURE: 92 MMHG

## 2019-01-29 DIAGNOSIS — Z12.39 SCREENING BREAST EXAMINATION: ICD-10-CM

## 2019-01-29 DIAGNOSIS — Z12.4 CERVICAL CANCER SCREENING: ICD-10-CM

## 2019-01-29 DIAGNOSIS — Z30.431 IUD CHECK UP: ICD-10-CM

## 2019-01-29 DIAGNOSIS — Z01.419 ENCOUNTER FOR GYNECOLOGICAL EXAMINATION WITHOUT ABNORMAL FINDING: Primary | ICD-10-CM

## 2019-01-29 PROBLEM — I10 HTN (HYPERTENSION): Status: ACTIVE | Noted: 2019-01-29

## 2019-01-29 PROBLEM — L72.3 SEBACEOUS CYST: Status: ACTIVE | Noted: 2019-01-29

## 2019-01-29 PROCEDURE — 87624 HPV HI-RISK TYP POOLED RSLT: CPT

## 2019-01-29 PROCEDURE — 99396 PR PREVENTIVE VISIT,EST,40-64: ICD-10-PCS | Mod: S$PBB,,, | Performed by: OBSTETRICS & GYNECOLOGY

## 2019-01-29 PROCEDURE — 87480 CANDIDA DNA DIR PROBE: CPT

## 2019-01-29 PROCEDURE — 87491 CHLMYD TRACH DNA AMP PROBE: CPT

## 2019-01-29 PROCEDURE — 99213 OFFICE O/P EST LOW 20 MIN: CPT | Mod: PBBFAC,PO | Performed by: OBSTETRICS & GYNECOLOGY

## 2019-01-29 PROCEDURE — 99999 PR PBB SHADOW E&M-EST. PATIENT-LVL III: CPT | Mod: PBBFAC,,, | Performed by: OBSTETRICS & GYNECOLOGY

## 2019-01-29 PROCEDURE — 87510 GARDNER VAG DNA DIR PROBE: CPT

## 2019-01-29 PROCEDURE — 88175 CYTOPATH C/V AUTO FLUID REDO: CPT

## 2019-01-29 PROCEDURE — 99396 PREV VISIT EST AGE 40-64: CPT | Mod: S$PBB,,, | Performed by: OBSTETRICS & GYNECOLOGY

## 2019-01-29 PROCEDURE — 99999 PR PBB SHADOW E&M-EST. PATIENT-LVL III: ICD-10-PCS | Mod: PBBFAC,,, | Performed by: OBSTETRICS & GYNECOLOGY

## 2019-01-29 NOTE — PROGRESS NOTES
39 yo  female who presents for routine gyn visit.  Reports that she has vaginal discharge with odor that is bothering her.  Unsure of when her last pap smear was. Reports long history of abnormal pap smears in the past.  . Desires all STD testing.    Has paragard IUD in place since 2018. Reports h/o irregular menstrual cycles before placement. Reports that cycles continue to be irregular since placement.  Reports that she can sometimes have 2 cycles each month.    Patient with known history of uterine fibroids.    ROS:  GENERAL: Denies weight gain or weight loss. Feeling well overall.   SKIN: Denies rash or lesions.   CHEST: Denies chest pain or shortness of breath.   CARDIOVASCULAR: Denies palpitations or left sided chest pain.   ABDOMEN: No abdominal pain, constipation, diarrhea, nausea, vomiting or rectal bleeding.   URINARY: No frequency, dysuria, hematuria, or burning on urination.  REPRODUCTIVE: See HPI.   BREASTS:denies pain, lumps, or nipple discharge.   HEMATOLOGIC: No easy bruisability or excessive bleeding.   MUSCULOSKELETAL: Denies joint pain or swelling.   NEUROLOGIC: Denies syncope or weakness.   PSYCHIATRIC: Denies depression, anxiety or mood swings.         PE:   Vitals: BP (!) 144/92   Wt 76.2 kg (167 lb 15.9 oz)   LMP 2019   BMI 31.74 kg/m²   APPEARANCE: Well nourished, well developed, in no acute distress.  SKIN: Normal skin turgor, no lesions.  CHEST: Lungs clear to auscultation.  HEART: Regular rate and rhythm, no murmurs, rubs or gallops.  ABDOMEN: Soft. No tenderness or masses. No hepatosplenomegaly. No hernias.  BREASTS: Symmetrical, no skin changes or visible lesions. No palpable masses, nipple discharge or adenopathy bilaterally.  PELVIC: Normal external female genitalia without lesions. Normal hair distribution. Adequate perineal body, normal urethral meatus. Vagina moist and well rugated without lesions or discharge. Cervix pink and without lesions. No significant  cystocele or rectocele. Bimanual exam showed uterus enlarged about 14 wks with palpable uterine fibroids; and nontender. Adnexa without masses or tenderness. Urethra and bladder normal. IUD string visualized.      AP  Routine gyn  -s/p normal breast exam: mammogram uptodate  -s/p normal pelvic exam:   -Pap and HPV: collected  -STD testing: gc/chl, hiv, rpr, hsv 1/2, hep b/c  -contraception: paragard for contraception  -vaginosis: affirm collected; patient says that she has used suprax in the past for this infection  -pelvic US ordered to check for IUD placement    tenzin luna MD

## 2019-01-30 ENCOUNTER — LAB VISIT (OUTPATIENT)
Dept: LAB | Facility: HOSPITAL | Age: 39
End: 2019-01-30
Attending: OBSTETRICS & GYNECOLOGY
Payer: MEDICAID

## 2019-01-30 DIAGNOSIS — Z01.419 ENCOUNTER FOR GYNECOLOGICAL EXAMINATION WITHOUT ABNORMAL FINDING: ICD-10-CM

## 2019-01-30 LAB
CANDIDA RRNA VAG QL PROBE: NEGATIVE
G VAGINALIS RRNA GENITAL QL PROBE: NEGATIVE
T VAGINALIS RRNA GENITAL QL PROBE: NEGATIVE

## 2019-01-30 PROCEDURE — 86703 HIV-1/HIV-2 1 RESULT ANTBDY: CPT

## 2019-01-30 PROCEDURE — 87340 HEPATITIS B SURFACE AG IA: CPT

## 2019-01-30 PROCEDURE — 86592 SYPHILIS TEST NON-TREP QUAL: CPT

## 2019-01-30 PROCEDURE — 36415 COLL VENOUS BLD VENIPUNCTURE: CPT

## 2019-01-30 PROCEDURE — 86803 HEPATITIS C AB TEST: CPT

## 2019-01-30 PROCEDURE — 86696 HERPES SIMPLEX TYPE 2 TEST: CPT

## 2019-01-30 PROCEDURE — 86706 HEP B SURFACE ANTIBODY: CPT

## 2019-01-31 ENCOUNTER — HOSPITAL ENCOUNTER (OUTPATIENT)
Dept: PREADMISSION TESTING | Facility: HOSPITAL | Age: 39
Discharge: HOME | End: 2019-01-31
Attending: SURGERY
Payer: MEDICAID

## 2019-01-31 LAB
C TRACH DNA SPEC QL NAA+PROBE: NOT DETECTED
HBV SURFACE AB SER-ACNC: POSITIVE M[IU]/ML
HBV SURFACE AG SERPL QL IA: NEGATIVE
HCV AB SERPL QL IA: NEGATIVE
HIV 1+2 AB+HIV1 P24 AG SERPL QL IA: NEGATIVE
N GONORRHOEA DNA SPEC QL NAA+PROBE: NOT DETECTED
RPR SER QL: NORMAL

## 2019-01-31 RX ORDER — LIDOCAINE HYDROCHLORIDE 10 MG/ML
1 INJECTION, SOLUTION EPIDURAL; INFILTRATION; INTRACAUDAL; PERINEURAL ONCE
Status: CANCELLED | OUTPATIENT
Start: 2019-01-31 | End: 2019-01-31

## 2019-01-31 RX ORDER — SODIUM CHLORIDE, SODIUM LACTATE, POTASSIUM CHLORIDE, CALCIUM CHLORIDE 600; 310; 30; 20 MG/100ML; MG/100ML; MG/100ML; MG/100ML
INJECTION, SOLUTION INTRAVENOUS CONTINUOUS
Status: CANCELLED | OUTPATIENT
Start: 2019-01-31

## 2019-01-31 NOTE — DISCHARGE INSTRUCTIONS
Your surgery is scheduled for 2/4/19.    Please report to Outpatient Surgery Intake Office on the 2nd FLOOR at 6:45a.m.          INSTRUCTIONS IMPORTANT!!!  ¨ Do not eat or drink after 12 midnight-including water. OK to brush teeth, no   gum, candy or mints!    ¨ Take only these medicines with a small swallow of water-morning of surgery: Amlodipine        ____  Proceed to [facility] on 1/31/19 for additional EKG test.        ____  Do not wear makeup, including mascara.  ____  No powder, lotions or creams to surgical area.  ____  Please remove all jewelry, including piercings and leave at home.  ____  No money or valuables needed. Please leave at home.  ____  Please bring any documents given by your doctor.  ____  If going home the same day, arrange for a ride home. You will not be able to             drive if Anesthesia was used.  ____  Wear loose fitting clothing. Allow for dressings, bandages.  ____  Stop Aspirin, Ibuprofen, Motrin and Aleve at least 3-5 days before surgery, unless otherwise instructed by your doctor, or the nurse.   You MAY use Tylenol/acetaminophen until day of surgery.  ____  Wash the surgical area with Hibiclens the night before surgery, and again the             morning of surgery.  Be sure to rinse hibiclens off completely (if instructed by   nurse).  ____  If you take diabetic medication, do not take am of surgery unless instructed by Doctor.  ____  Call MD for temperature above 101 degrees or any other signs of infection such as Urinary (bladder) infection, Upper respiratory infection, skin boils, etc.  ____ Stop taking any Fish Oil supplement or any Vitamins that contain Vitamin E at least 5 days prior to surgery.  ____ Do Not wear your contact lenses the day of your procedure.  You may wear your glasses.      ____Do not shave for 3 days prior to surgery.  ____ Practice Good hand washing before, during, and after procedure.      I have read or had read and explained to me,  and understand the above information.  Additional comments or instructions:  For additional questions call [phone]      Pre-Op Bathing Instructions    Before surgery, you can play an important role in your own health.    Because skin is not sterile, we need to be sure that your skin is as free of germs as possible. By following the instructions below, you can reduce the number of germs on your skin before surgery.    IMPORTANT: You will need to shower with a special soap called Hibiclens*, available at any pharmacy.  If you are allergic to Chlorhexidine (the antiseptic in Hibiclens), use an antibacterial soap such as Dial Soap for your preoperative shower.  You will shower with Hibiclens both the night before your surgery and the morning of your surgery.  Do not use Hibiclens on the head, face or genitals to avoid injury to those areas.    STEP #1: THE NIGHT BEFORE YOUR SURGERY     1. Do not shave the area of your body where your surgery will be performed.  2. Shower and wash your hair and body as usual with your normal soap and shampoo.  3. Rinse your hair and body thoroughly after you shower to remove all soap residue.  4. With your hand, apply one packet of Hibiclens soap to the surgical site.   5. Wash the site gently for five (5) minutes. Do not scrub your skin too hard.   6. Do not wash with your regular soap after Hibiclens is used.  7. Rinse your body thoroughly.  8. Pat yourself dry with a clean, soft towel.  9. Do not use lotion, cream, or powder.  10. Wear clean clothes.    STEP #2: THE MORNING OF YOUR SURGERY     1. Repeat Step #1.    * Not to be used by people allergic to Chlorhexidine.

## 2019-01-31 NOTE — PRE-PROCEDURE INSTRUCTIONS
[facility] - [phone]    Allergies, medical, surgical, family and psychosocial histories reviewed with patient. Periop plan of care reviewed. Preop instructions given, including medications to take and to hold. Hibiclens soap and instructions on use given. Time allotted for questions to be addressed.  Patient verbalized understanding.

## 2019-02-01 LAB
HSV1 IGG SERPL QL IA: NEGATIVE
HSV2 IGG SERPL QL IA: POSITIVE

## 2019-02-05 LAB
HPV HR 12 DNA CVX QL NAA+PROBE: NEGATIVE
HPV16 AG SPEC QL: NEGATIVE
HPV18 DNA SPEC QL NAA+PROBE: NEGATIVE

## 2019-02-14 ENCOUNTER — TELEPHONE (OUTPATIENT)
Dept: REHABILITATION | Facility: HOSPITAL | Age: 39
End: 2019-02-14

## 2019-02-14 DIAGNOSIS — M65.4 RADIAL STYLOID TENOSYNOVITIS: Primary | ICD-10-CM

## 2019-02-14 NOTE — TELEPHONE ENCOUNTER
Left message for pt to schedule therapy appointment for OT. Pt to be scheduled for 1:1 for reassesment and then scheduled 1 week at a time due to previous attendance policy.     Precious Maria, GABYD, OTR/L, CHT   Occupational therapist, Certified Hand Therapist

## 2019-02-19 ENCOUNTER — TELEPHONE (OUTPATIENT)
Dept: OBSTETRICS AND GYNECOLOGY | Facility: CLINIC | Age: 39
End: 2019-02-19

## 2019-02-19 ENCOUNTER — PATIENT MESSAGE (OUTPATIENT)
Dept: OBSTETRICS AND GYNECOLOGY | Facility: CLINIC | Age: 39
End: 2019-02-19

## 2019-02-19 NOTE — TELEPHONE ENCOUNTER
Emily caught me in the hallway and was talking about her not being able to see Dr. Mcfarland for her UTI because her medicaid was terminated but now it is back in force and could Dr. Cabrera see her for a UTI.  I explained that Dr. Cabrera has no appointments available today and she should try to contact her PCP for an appt.  She understood and had no further questions.

## 2019-02-19 NOTE — TELEPHONE ENCOUNTER
Message     ----- Message from [facility], System Message sent at 2/19/2019 10:25 AM CST -----      Appointment Request From: Emily Mckeon      With Provider: Ban Cabrera MD [[facility] - [facility]N]      Preferred Date Range: 2/19/2019 - 2/20/2019      Preferred Times: Any time      Reason for visit: bladder infection      Comments:   bladder infection

## 2019-02-25 ENCOUNTER — HOSPITAL ENCOUNTER (OUTPATIENT)
Dept: RADIOLOGY | Facility: HOSPITAL | Age: 39
Discharge: HOME | End: 2019-02-25
Attending: OBSTETRICS & GYNECOLOGY
Payer: MEDICAID

## 2019-02-25 DIAGNOSIS — Z12.39 SCREENING BREAST EXAMINATION: ICD-10-CM

## 2019-02-25 PROCEDURE — 77063 BREAST TOMOSYNTHESIS BI: CPT | Mod: 26,,, | Performed by: RADIOLOGY

## 2019-02-25 PROCEDURE — 77063 MAMMO DIGITAL SCREENING BILAT WITH TOMOSYNTHESIS_CAD: ICD-10-PCS | Mod: 26,,, | Performed by: RADIOLOGY

## 2019-02-25 PROCEDURE — 77067 SCR MAMMO BI INCL CAD: CPT | Mod: 26,,, | Performed by: RADIOLOGY

## 2019-02-25 PROCEDURE — 77067 MAMMO DIGITAL SCREENING BILAT WITH TOMOSYNTHESIS_CAD: ICD-10-PCS | Mod: 26,,, | Performed by: RADIOLOGY

## 2019-02-25 PROCEDURE — 77067 SCR MAMMO BI INCL CAD: CPT | Mod: TC

## 2019-02-25 NOTE — PLAN OF CARE
Scheduled for surgery on 3/8/19- PAT was done on 1/31/19, then procedure was rescheduled.  Attempted to call patient in order to give preop instructions- no answer.

## 2019-03-07 ENCOUNTER — ANESTHESIA EVENT (OUTPATIENT)
Dept: SURGERY | Facility: HOSPITAL | Age: 39
End: 2019-03-07
Payer: MEDICAID

## 2019-03-07 NOTE — ANESTHESIA PREPROCEDURE EVALUATION
2019  Emily Mckeon is a 38 y.o., female re-scheduled for excision of cyst local/MAC.    Patient Active Problem List   Diagnosis    Chronic hypertension affecting pregnancy    Impaired glucose in pregnancy, antepartum- T1 OB glucose 137    Elderly multigravida    Tension type headache    H/O  section x3    Iron deficiency anemia secondary to inadequate dietary iron intake    Headache in pregnancy    Pregnancy headache in second trimester    Normocytic anemia     delivery delivered    Muscle weakness    Stiffness in joint    Arthralgia of left wrist    HTN (hypertension)    Sebaceous cyst     Past Medical History:   Diagnosis Date    Chronic back pain     Elevated cholesterol     Fibroids     Hypertension     Impaired glucose in pregnancy, antepartum     Migraine      Past Surgical History:   Procedure Laterality Date     SECTION, CLASSIC      x4; all at term    DELIVERY- SECTION N/A 3/1/2018    Performed by Il[facility] at [facility] L[facility]D    LIPOSUCTION W/ FAT INJECTION      WRIST SURGERY       Anesthesia Evaluation      I have reviewed the Medications.     Review of Systems  Anesthesia Hx:  No problems with previous Anesthesia   Social:  Non-Smoker, Social Alcohol Use    Hematology/Oncology:         -- Anemia:   Cardiovascular:   Hypertension, poorly controlled  Denies Angina.        Pulmonary:  Pulmonary Normal  Denies Shortness of breath.    Renal/:  Renal/ Normal     Hepatic/GI:   Denies Liver Disease.    Neurological:   Headaches Denies Seizures.    Endocrine:  Endocrine Normal      Wt Readings from Last 1 Encounters:   19 73.9 kg (163 lb)     Temp Readings from Last 1 Encounters:   18 36.9 °C (98.4 °F) (Oral)     BP Readings from Last 1 Encounters:   19 (!) 173/102     Pulse Readings from Last 1 Encounters:    01/29/19 79     SpO2 Readings from Last 1 Encounters:   06/09/18 100%       Physical Exam  General:  Obesity       Chest/Lungs:  Chest/Lungs Findings: Clear to auscultation, Normal Respiratory Rate     Heart/Vascular:  Heart Findings: Rate: Normal  Rhythm: Regular Rhythm  Sounds: Normal        Mental Status:  Mental Status Findings:  Cooperative, Alert and Oriented       Lab Results   Component Value Date    WBC 11.17 03/09/2018    HGB 7.0 (L) 03/09/2018    HCT 25.6 (L) 03/09/2018    MCV 78 (L) 03/09/2018     (H) 03/09/2018        Chemistry        Component Value Date/Time     03/09/2018 1226    K 4.1 03/09/2018 1226     03/09/2018 1226    CO2 26 03/09/2018 1226    BUN 8 03/09/2018 1226    CREATININE 0.6 03/09/2018 1226    GLU 83 03/09/2018 1226        Component Value Date/Time    CALCIUM 9.5 03/09/2018 1226    ALKPHOS 100 03/09/2018 1226    AST 16 03/09/2018 1226    ALT 12 03/09/2018 1226    BILITOT 0.6 03/09/2018 1226    ESTGFRAFRICA >60.0 03/09/2018 1226    EGFRNONAA >60.0 03/09/2018 1226            Lab Results   Component Value Date    ALBUMIN 3.2 (L) 03/09/2018    No results found for: TSH, N0GDIWD, K5DPPQI, THYROIDAB   Lab Results   Component Value Date    APTT 22.7 07/01/2016      No results found for: INR, PROTIME    CXR      EKG      ECHO      Anesthesia Plan  Type of Anesthesia, risks & benefits discussed:  Anesthesia Type:  MAC  Patient's Preference:   Intra-op Monitoring Plan:   Intra-op Monitoring Plan Comments:   Post Op Pain Control Plan:   Post Op Pain Control Plan Comments:   Induction:   IV  Beta Blocker:  Patient is not currently on a Beta-Blocker (No further documentation required).       Informed Consent: Patient understands risks and agrees with Anesthesia plan.  Questions answered.   ASA Score: 2     Day of Surgery Review of History & Physical:        Anesthesia Plan Notes: Anesthesia consent signed 2/4/19          Ready For Surgery From Anesthesia Perspective.

## 2019-03-08 ENCOUNTER — ANESTHESIA (OUTPATIENT)
Dept: SURGERY | Facility: HOSPITAL | Age: 39
End: 2019-03-08
Payer: MEDICAID

## 2019-03-08 ENCOUNTER — HOSPITAL ENCOUNTER (OUTPATIENT)
Facility: HOSPITAL | Age: 39
Discharge: HOME | End: 2019-03-08
Attending: SURGERY | Admitting: SURGERY
Payer: MEDICAID

## 2019-03-08 VITALS
WEIGHT: 163 LBS | BODY MASS INDEX: 30.78 KG/M2 | HEIGHT: 61 IN | HEART RATE: 80 BPM | OXYGEN SATURATION: 98 % | SYSTOLIC BLOOD PRESSURE: 159 MMHG | RESPIRATION RATE: 17 BRPM | TEMPERATURE: 98 F | DIASTOLIC BLOOD PRESSURE: 72 MMHG

## 2019-03-08 DIAGNOSIS — I10 ESSENTIAL HYPERTENSION: ICD-10-CM

## 2019-03-08 DIAGNOSIS — O10.919 CHRONIC HYPERTENSION AFFECTING PREGNANCY: ICD-10-CM

## 2019-03-08 DIAGNOSIS — L72.3 SEBACEOUS CYST: Primary | ICD-10-CM

## 2019-03-08 DIAGNOSIS — O99.810 IMPAIRED GLUCOSE IN PREGNANCY, ANTEPARTUM: ICD-10-CM

## 2019-03-08 DIAGNOSIS — D64.9 NORMOCYTIC ANEMIA: ICD-10-CM

## 2019-03-08 LAB
B-HCG UR QL: NEGATIVE
CTP QC/QA: YES

## 2019-03-08 PROCEDURE — 00400 ANES INTEGUMENTARY SYS NOS: CPT | Performed by: SURGERY

## 2019-03-08 PROCEDURE — 37000009 HC ANESTHESIA EA ADD 15 MINS: Performed by: SURGERY

## 2019-03-08 PROCEDURE — 81025 URINE PREGNANCY TEST: CPT | Performed by: SURGERY

## 2019-03-08 PROCEDURE — 36000707: Performed by: SURGERY

## 2019-03-08 PROCEDURE — 88304 TISSUE EXAM BY PATHOLOGIST: CPT | Performed by: PATHOLOGY

## 2019-03-08 PROCEDURE — 25000003 PHARM REV CODE 250: Performed by: ANESTHESIOLOGY

## 2019-03-08 PROCEDURE — 25000003 PHARM REV CODE 250: Performed by: NURSE ANESTHETIST, CERTIFIED REGISTERED

## 2019-03-08 PROCEDURE — 25000003 PHARM REV CODE 250: Performed by: SURGERY

## 2019-03-08 PROCEDURE — 36000706: Performed by: SURGERY

## 2019-03-08 PROCEDURE — 71000016 HC POSTOP RECOV ADDL HR: Performed by: SURGERY

## 2019-03-08 PROCEDURE — 37000008 HC ANESTHESIA 1ST 15 MINUTES: Performed by: SURGERY

## 2019-03-08 PROCEDURE — 88304 TISSUE EXAM BY PATHOLOGIST: CPT | Mod: 26,,, | Performed by: PATHOLOGY

## 2019-03-08 PROCEDURE — 63600175 PHARM REV CODE 636 W HCPCS: Performed by: NURSE ANESTHETIST, CERTIFIED REGISTERED

## 2019-03-08 PROCEDURE — 88304 TISSUE SPECIMEN TO PATHOLOGY - SURGERY: ICD-10-PCS | Mod: 26,,, | Performed by: PATHOLOGY

## 2019-03-08 PROCEDURE — 71000015 HC POSTOP RECOV 1ST HR: Performed by: SURGERY

## 2019-03-08 RX ORDER — LIDOCAINE HYDROCHLORIDE 10 MG/ML
INJECTION, SOLUTION EPIDURAL; INFILTRATION; INTRACAUDAL; PERINEURAL
Status: DISCONTINUED | OUTPATIENT
Start: 2019-03-08 | End: 2019-03-08 | Stop reason: HOSPADM

## 2019-03-08 RX ORDER — PROPOFOL 10 MG/ML
VIAL (ML) INTRAVENOUS
Status: DISCONTINUED | OUTPATIENT
Start: 2019-03-08 | End: 2019-03-08

## 2019-03-08 RX ORDER — SODIUM CHLORIDE, SODIUM LACTATE, POTASSIUM CHLORIDE, CALCIUM CHLORIDE 600; 310; 30; 20 MG/100ML; MG/100ML; MG/100ML; MG/100ML
INJECTION, SOLUTION INTRAVENOUS CONTINUOUS
Status: DISCONTINUED | OUTPATIENT
Start: 2019-03-08 | End: 2019-03-08 | Stop reason: HOSPADM

## 2019-03-08 RX ORDER — MIDAZOLAM HYDROCHLORIDE 1 MG/ML
INJECTION, SOLUTION INTRAMUSCULAR; INTRAVENOUS
Status: DISCONTINUED | OUTPATIENT
Start: 2019-03-08 | End: 2019-03-08

## 2019-03-08 RX ORDER — BACITRACIN 50000 [IU]/1
INJECTION, POWDER, FOR SOLUTION INTRAMUSCULAR
Status: DISCONTINUED | OUTPATIENT
Start: 2019-03-08 | End: 2019-03-08 | Stop reason: HOSPADM

## 2019-03-08 RX ORDER — DIPHENHYDRAMINE HYDROCHLORIDE 50 MG/ML
INJECTION INTRAMUSCULAR; INTRAVENOUS
Status: DISCONTINUED | OUTPATIENT
Start: 2019-03-08 | End: 2019-03-08

## 2019-03-08 RX ORDER — SODIUM CHLORIDE 9 MG/ML
INJECTION, SOLUTION INTRAVENOUS CONTINUOUS
Status: DISCONTINUED | OUTPATIENT
Start: 2019-03-08 | End: 2019-03-08 | Stop reason: HOSPADM

## 2019-03-08 RX ORDER — ZOLPIDEM TARTRATE 5 MG/1
5 TABLET ORAL NIGHTLY PRN
Status: DISCONTINUED | OUTPATIENT
Start: 2019-03-08 | End: 2019-03-08 | Stop reason: HOSPADM

## 2019-03-08 RX ORDER — LIDOCAINE HCL/PF 100 MG/5ML
SYRINGE (ML) INTRAVENOUS
Status: DISCONTINUED | OUTPATIENT
Start: 2019-03-08 | End: 2019-03-08

## 2019-03-08 RX ORDER — CEFAZOLIN SODIUM 1 G/3ML
INJECTION, POWDER, FOR SOLUTION INTRAMUSCULAR; INTRAVENOUS
Status: DISCONTINUED | OUTPATIENT
Start: 2019-03-08 | End: 2019-03-08

## 2019-03-08 RX ORDER — GLYCOPYRROLATE 0.2 MG/ML
INJECTION INTRAMUSCULAR; INTRAVENOUS
Status: DISCONTINUED | OUTPATIENT
Start: 2019-03-08 | End: 2019-03-08

## 2019-03-08 RX ORDER — ACETAMINOPHEN 325 MG/1
650 TABLET ORAL EVERY 4 HOURS PRN
Status: DISCONTINUED | OUTPATIENT
Start: 2019-03-08 | End: 2019-03-08 | Stop reason: HOSPADM

## 2019-03-08 RX ORDER — KETAMINE HYDROCHLORIDE 50 MG/ML
INJECTION, SOLUTION INTRAMUSCULAR; INTRAVENOUS
Status: DISCONTINUED | OUTPATIENT
Start: 2019-03-08 | End: 2019-03-08

## 2019-03-08 RX ORDER — PROPOFOL 10 MG/ML
VIAL (ML) INTRAVENOUS CONTINUOUS PRN
Status: DISCONTINUED | OUTPATIENT
Start: 2019-03-08 | End: 2019-03-08

## 2019-03-08 RX ORDER — OXYCODONE AND ACETAMINOPHEN 10; 325 MG/1; MG/1
1 TABLET ORAL ONCE
Status: COMPLETED | OUTPATIENT
Start: 2019-03-08 | End: 2019-03-08

## 2019-03-08 RX ADMIN — KETAMINE HYDROCHLORIDE 30 MG: 50 INJECTION, SOLUTION, CONCENTRATE INTRAMUSCULAR; INTRAVENOUS at 09:03

## 2019-03-08 RX ADMIN — LIDOCAINE HYDROCHLORIDE 50 MG: 20 INJECTION, SOLUTION INTRAVENOUS at 09:03

## 2019-03-08 RX ADMIN — SODIUM CHLORIDE, SODIUM LACTATE, POTASSIUM CHLORIDE, AND CALCIUM CHLORIDE: .6; .31; .03; .02 INJECTION, SOLUTION INTRAVENOUS at 09:03

## 2019-03-08 RX ADMIN — MIDAZOLAM 2 MG: 1 INJECTION INTRAMUSCULAR; INTRAVENOUS at 09:03

## 2019-03-08 RX ADMIN — PROPOFOL 150 MCG/KG/MIN: 10 INJECTION, EMULSION INTRAVENOUS at 09:03

## 2019-03-08 RX ADMIN — DIPHENHYDRAMINE HYDROCHLORIDE 25 MG: 50 INJECTION, SOLUTION INTRAMUSCULAR; INTRAVENOUS at 10:03

## 2019-03-08 RX ADMIN — OXYCODONE HYDROCHLORIDE AND ACETAMINOPHEN 1 TABLET: 10; 325 TABLET ORAL at 11:03

## 2019-03-08 RX ADMIN — GLYCOPYRROLATE 0.1 MG: 0.2 INJECTION, SOLUTION INTRAMUSCULAR; INTRAVENOUS at 09:03

## 2019-03-08 RX ADMIN — PROPOFOL 80 MG: 10 INJECTION, EMULSION INTRAVENOUS at 09:03

## 2019-03-08 RX ADMIN — CEFAZOLIN 1 G: 330 INJECTION, POWDER, FOR SOLUTION INTRAMUSCULAR; INTRAVENOUS at 10:03

## 2019-03-08 RX ADMIN — SODIUM CHLORIDE, SODIUM LACTATE, POTASSIUM CHLORIDE, AND CALCIUM CHLORIDE: .6; .31; .03; .02 INJECTION, SOLUTION INTRAVENOUS at 07:03

## 2019-03-08 NOTE — TRANSFER OF CARE
"Anesthesia Transfer of Care Note    Patient: Emily Rubalcava Mckeon    Procedure(s) Performed: Procedure(s) (LRB):  EXCISION, CYST (N/A)    Patient location: PACU    Anesthesia Type: MAC    Transport from OR: Transported from OR on room air with adequate spontaneous ventilation    Post pain: adequate analgesia    Post assessment: no apparent anesthetic complications and tolerated procedure well    Post vital signs: stable    Level of consciousness: sedated and awake    Nausea/Vomiting: no nausea/vomiting    Complications: none    Transfer of care protocol was followed      Last vitals:   Visit Vitals  BP (!) 183/94 (BP Location: Left arm, Patient Position: Lying)   Pulse 61   Temp 36.6 °C (97.9 °F) (Skin)   Resp 20   Ht 5' 1" (1.549 m)   Wt 73.9 kg (163 lb)   LMP 02/09/2019   SpO2 99%   BMI 30.80 kg/m²     "

## 2019-03-08 NOTE — OP NOTE
DATE OF PROCEDURE:  03/08/2019.    PREOPERATIVE DIAGNOSIS:  Infected sebaceous cyst, anterior chest.    POSTOPERATIVE DIAGNOSIS:  Infected sebaceous cyst, anterior chest.    OPERATION PERFORMED:  Excision of infected sebaceous cyst, 3 x 4 cm.    SURGEON:  Dora Villalba M.D.    ANESTHESIA:  Xylocaine 1% with IV sedation.    PROCEDURE IN DETAIL:  After satisfactory IV sedation, the patient was   positioned.  The anterior chest and upper abdomen was prepped and draped in   normal sterile manner using ChloraPrep.  Timeout was called, site was confirmed.    The area was infiltrated using 1% Xylocaine solution.  An elliptical incision   was made completely excising the cyst and the deep subcutaneous tissue.  Size   excised was 3 x 4 cm.  Wound was thoroughly irrigated with antibiotic solution.    Bleeders were clamped and cauterized using electrocautery.  Hemostasis was   satisfactorily maintained.  Wound was thoroughly irrigated with antibiotic   solution and then approximated using 3-0 Vicryl for subcutaneous tissue.  Skin   was closed using running 4-0 Monocryl.  Sterile gauze dressing was applied.  The   instrument count, sponge count, and needle count was correct.  The patient   tolerated it well.  Estimated blood loss was 20 mL.  Specimen removed was cyst.    No intraoperative complications and findings were the same.      MS/IN  dd: 03/08/2019 10:35:09 (CST)  td: 03/08/2019 11:50:16 (CST)  Doc ID   #[id number]  Job ID #[id number]    CC:

## 2019-03-08 NOTE — ANESTHESIA POSTPROCEDURE EVALUATION
"Anesthesia Post Evaluation    Patient: Emily Rubalcava Mckeon    Procedure(s) Performed: Procedure(s) (LRB):  EXCISION, CYST (N/A)    Final Anesthesia Type: MAC  Patient location during evaluation: PACU  Patient participation: Yes- Able to Participate  Level of consciousness: awake  Post-procedure vital signs: reviewed and stable  Pain management: adequate  Airway patency: patent  PONV status at discharge: No PONV  Anesthetic complications: no      Cardiovascular status: stable  Respiratory status: unassisted and room air  Hydration status: euvolemic  Follow-up not needed.        Visit Vitals  BP (!) 165/78   Pulse 93   Temp 36.6 °C (97.8 °F) (Skin)   Resp 19   Ht 5' 1" (1.549 m)   Wt 73.9 kg (163 lb)   LMP 02/09/2019   SpO2 98%   BMI 30.80 kg/m²       Pain/Flako Score: Pain Rating Prior to Med Admin: 5 (3/8/2019 11:02 AM)  Flako Score: 10 (3/8/2019 10:35 AM)        "

## 2019-03-08 NOTE — H&P
History of Present Illness:  Patient is a 38 y.o. female presents with            Chief Complaint   Patient presents with    Recurrent Skin Infections       cyst between breast               Review of patient's allergies indicates:   Allergen Reactions    Demerol [meperidine] Hives    Morphine Hives    Pcn [penicillins]           Current Medications          Current Outpatient Medications   Medication Sig Dispense Refill    amLODIPine (NORVASC) 5 MG tablet Take 1 tablet (5 mg total) by mouth once daily. 30 tablet 11    cyclobenzaprine (FLEXERIL) 5 MG tablet Take 1 tablet (5 mg total) by mouth 3 (three) times daily as needed for Muscle spasms. 9 tablet 0    HYDROcodone-acetaminophen (NORCO)  mg per tablet Take 1 tablet  by mouth every 4 to 6 hours as needed for breakthrough pain. 30 tablet 0    ibuprofen (ADVIL,MOTRIN) 600 MG tablet Take 1 tablet (600 mg total) by mouth every 6 (six) hours. 30 tablet 3    ondansetron (ZOFRAN) 4 MG tablet Take 1 tablet (4 mg total) by mouth every 4 to 6 hours with pain medication. 30 tablet 0    oxyCODONE-acetaminophen (PERCOCET)  mg per tablet Take 1 tablet by mouth every 4 to 6 hours for pain. 30 tablet 0    oxyCODONE-acetaminophen (PERCOCET) 5-325 mg per tablet Take 1 tablet by mouth every 4 (four) hours as needed. 14 tablet 0    sulfamethoxazole-trimethoprim 800-160mg (BACTRIM DS) 800-160 mg Tab Take 1 tablet by mouth 2 (two) times daily with meals. 10 tablet 0    traMADol (ULTRAM) 50 mg tablet Take 1 tablet (50 mg total) by mouth every 8 hours as needed for pain 30 tablet 0      No current facility-administered medications for this visit.                  Past Medical History:   Diagnosis Date    Chronic back pain      Elevated cholesterol      Hypertension      Impaired glucose in pregnancy, antepartum      Migraine              Past Surgical History:   Procedure Laterality Date     SECTION, CLASSIC         x4; all at term     "DELIVERY- SECTION N/A 3/1/2018     Performed by Iliana Domínguez MD at [facility]    LIPOSUCTION W/ FAT INJECTION                Family History   Problem Relation Age of Onset    Breast cancer Neg Hx      Colon cancer Neg Hx      Ovarian cancer Neg Hx        Social History           Tobacco Use    Smoking status: Never Smoker    Smokeless tobacco: Never Used   Substance Use Topics    Alcohol use: No    Drug use: No         Review of Systems:     Review of Systems   Constitutional: Negative.  Negative for chills and fever.   HENT: Negative.    Eyes: Negative.    Respiratory: Negative.    Cardiovascular: Negative.    Gastrointestinal: Negative.    Genitourinary: Negative.    Musculoskeletal: Negative.    Skin: Negative.    Neurological: Negative.    Psychiatric/Behavioral: Negative.          OBJECTIVE:      Vital Signs (Most Recent)  Pulse: 79 (19 1325)  Resp: 17 (19 1325)  BP: (!) 173/102 (19 1325)  5' 1" (1.549 m)  73.9 kg (163 lb)      Physical Exam:     Physical Exam   Constitutional: She is oriented to person, place, and time. She appears well-developed and well-nourished.   HENT:   Head: Normocephalic.   Eyes: Conjunctivae and EOM are normal. Pupils are equal, round, and reactive to light.   Neck: Normal range of motion. Neck supple.   Cardiovascular: Normal rate, regular rhythm, normal heart sounds and intact distal pulses.   Pulmonary/Chest: Effort normal and breath sounds normal.       Abdominal: Soft. Bowel sounds are normal.   Musculoskeletal: Normal range of motion.   Neurological: She is alert and oriented to person, place, and time. She has normal reflexes.   Skin: Skin is warm and dry.         Laboratory        Diagnostic Results:        ASSESSMENT/PLAN:       cyst ant chest wall     PLAN:Plan    Excision today              "

## 2019-03-08 NOTE — BRIEF OP NOTE
Operative Note       Surgery Date: 3/8/2019     Surgeon(s) and Role:     * Dora Villalba MD - Primary  Assist : NA  Pre-op Diagnosis:  Sebaceous cyst [L72.3]    Post-op Diagnosis: Post-Op Diagnosis Codes:     * Sebaceous cyst [L72.3]    Procedure(s) (LRB):  EXCISION, CYST (N/A)    Anesthesia: General/MAC    Procedure in Detail/Findings:  Excision sebaceous cyst chest wall done under mac anaesthesia , patient tolerated well and was sent to recovery room in stable condition , no intra op complications, intra op findings same    Estimated Blood Loss: 20 cc         Specimens (From admission, onward)    Start     Ordered    03/08/19 1013  Specimen to Pathology - Surgery  Sebaceous cyst 3 x 4 cm   Start Status   03/08/19 1013 Collected (03/08/19 1019)       03/08/19 1013        Implants: * No implants in log *           Disposition: PACU - hemodynamically stable.           Condition: Good    Attestation:  I performed the procedure.           Discharge Note    Admit Date: 3/8/2019    Attending Physician: Dora Villalba MD     Discharge Physician: Dora Villalba MD    Final Diagnosis: Post-Op Diagnosis Codes:     * Sebaceous cyst [L72.3]    Disposition: Home or Self Care    Patient Instructions:   Current Discharge Medication List      CONTINUE these medications which have NOT CHANGED    Details   amLODIPine (NORVASC) 5 MG tablet Take 1 tablet (5 mg total) by mouth once daily.  Qty: 30 tablet, Refills: 11    Associated Diagnoses: Chronic hypertension      oxyCODONE-acetaminophen (PERCOCET)  mg per tablet Take 1 tablet by mouth every 4 to 6 hours for pain.  Qty: 30 tablet, Refills: 0      triamterene-hydrochlorothiazide 37.5-25 mg (MAXZIDE-25) 37.5-25 mg per tablet Take 1 tablet by mouth once daily.  Qty: 30 tablet, Refills: 1      ergocalciferol (VITAMIN D2) 50,000 unit Cap Take 1 capsule by mouth weekly  Qty: 4 capsule, Refills: 4      fluticasone (FLONASE) 50 mcg/actuation nasal spray Use 1 spray  into each nostril daily.  Qty: 16 g, Refills: 1      montelukast (SINGULAIR) 10 mg tablet Take 1 tablet (10 mg total) by mouth once daily.  Qty: 30 tablet, Refills: 2      topiramate (TOPAMAX) 25 MG tablet Take 1 tablet (25 mg total) by mouth once daily for migraine.  Qty: 30 tablet, Refills: 2             Regular diet , no heavy lifting , keep dressing dry , rweturn to office one week.   Discharge Procedure Orders (must include Diet, Follow-up, Activity)   Diet general     Keep surgical extremity elevated     Lifting restrictions     Call MD for:  temperature >100.4     Call MD for:  persistent nausea and vomiting     Call MD for:  severe uncontrolled pain     Call MD for:  redness, tenderness, or signs of infection (pain, swelling, redness, odor or green/yellow discharge around incision site)     Leave dressing on - Keep it clean, dry, and intact until clinic visit        Discharge Date: 3/8/2019

## 2019-03-08 NOTE — DISCHARGE INSTRUCTIONS
ANESTHESIA  -For the first 24 hours after surgery:  Do not drive, use heavy equipment, make important decisions, or drink alcohol  -It is normal to feel sleepy for several hours.  Rest until you are more awake.  -Have someone stay with you, if needed.  They can watch for problems and help keep you safe.  -Some possible post anesthesia side effects include: nausea and vomiting, sore throat and hoarseness, sleepiness, and dizziness.    PAIN  -If you have pain after surgery, pain medicine will help you feel better.  Take it as directed, before pain becomes severe.  Most pain relievers taken by mouth need at least 20-30 minutes to start working.  -Do not drive or drink alcohol while taking pain medicine.  -Pain medication can upset your stomach.  Taking them with a little food may help.  -Other ways to help control pain: elevation, ice, and relaxation  -Call your surgeon if still having unmanageable pain an hour after taking pain medicine.  -Pain medicine can cause constipation.  Taking an over-the counter stool softener while on prescription pain medicine and drinking plenty of fluids can prevent this side effect.  -Call your surgeon if you have severe side effects like: breathing problems, trouble waking up, dizziness, confusion, or severe constipation.    Keep dressing dry and intact until postop appointment.        NAUSEA  -Some people have nausea after surgery.  This is often because of anesthesia, pain, pain medicine, or the stress of surgery.  -Do not push yourself to eat.  Start off with clear liquids and soup.  Slowly move to solid foods.  Don't eat fatty, rich, spicy foods at first.  Eat smaller amounts.  -If you develop persistent nausea and vomiting please notify your surgeon immediately.    BLEEDING  -Different types of surgery require different types of care and dressing changes.  It is important to follow all instructions and advice from your surgeon.  Change dressing as directed.  Call your surgeon for  any concerns regarding postop bleeding.    SIGNS OF INFECTION  -Signs of infection include: fever, swelling, drainage, and redness  -Notify your surgeon if you have a fever of 100.4 F (38.0 C) or higher.  -Notify your surgeon if you notice redness, swelling, increased pain, pus, or a foul smell at the incision site.

## 2019-03-19 DIAGNOSIS — I35.1 AORTIC INSUFFICIENCY: Primary | ICD-10-CM

## 2019-03-26 ENCOUNTER — HOSPITAL ENCOUNTER (OUTPATIENT)
Dept: CARDIOLOGY | Facility: HOSPITAL | Age: 39
Discharge: HOME | End: 2019-03-26
Attending: INTERNAL MEDICINE
Payer: MEDICAID

## 2019-03-26 DIAGNOSIS — I35.1 AORTIC INSUFFICIENCY: ICD-10-CM

## 2019-03-26 LAB
AORTIC ROOT ANNULUS: 2.73 CM
AV INDEX (PROSTH): 0.97
AV MEAN GRADIENT: 3.84 MMHG
AV PEAK GRADIENT: 8.29 MMHG
AV VALVE AREA: 3.23 CM2
AV VELOCITY RATIO: 1
CV ECHO LV RWT: 0.44 CM
DOP CALC AO PEAK VEL: 1.44 M/S
DOP CALC AO VTI: 34.36 CM
DOP CALC LVOT AREA: 3.33 CM2
DOP CALC LVOT DIAMETER: 2.06 CM
DOP CALC LVOT PEAK VEL: 1.43 M/S
DOP CALC LVOT STROKE VOLUME: 111 CM3
DOP CALCLVOT PEAK VEL VTI: 33.32 CM
E WAVE DECELERATION TIME: 175.57 MSEC
E/A RATIO: 1.67
ECHO LV POSTERIOR WALL: 1.06 CM (ref 0.6–1.1)
FRACTIONAL SHORTENING: 35 % (ref 28–44)
INTERVENTRICULAR SEPTUM: 1.13 CM (ref 0.6–1.1)
LA MAJOR: 4.8 CM
LA MINOR: 4.44 CM
LA WIDTH: 3.86 CM
LEFT ATRIUM SIZE: 3.98 CM
LEFT ATRIUM VOLUME: 60.24 CM3
LEFT INTERNAL DIMENSION IN SYSTOLE: 3.1 CM (ref 2.1–4)
LEFT VENTRICLE DIASTOLIC VOLUME: 105.77 ML
LEFT VENTRICLE SYSTOLIC VOLUME: 37.97 ML
LEFT VENTRICULAR INTERNAL DIMENSION IN DIASTOLE: 4.77 CM (ref 3.5–6)
LEFT VENTRICULAR MASS: 190.81 G
LV LATERAL E/E' RATIO: 12
MV PEAK A VEL: 0.72 M/S
MV PEAK E VEL: 1.2 M/S
PISA TR MAX VEL: 2.16 M/S
PULM VEIN S/D RATIO: 1.36
PV PEAK D VEL: 0.55 M/S
PV PEAK S VEL: 0.75 M/S
PV PEAK VELOCITY: 0.95 CM/S
RA MAJOR: 4.39 CM
RA PRESSURE: 3 MMHG
RIGHT VENTRICULAR END-DIASTOLIC DIMENSION: 2.33 CM
STJ: 2.47 CM
TDI LATERAL: 0.1
TR MAX PG: 18.66 MMHG
TV REST PULMONARY ARTERY PRESSURE: 22 MMHG

## 2019-03-26 PROCEDURE — 93306 TTE W/DOPPLER COMPLETE: CPT | Mod: 26,,, | Performed by: INTERNAL MEDICINE

## 2019-03-26 PROCEDURE — 93306 TTE W/DOPPLER COMPLETE: CPT

## 2019-03-26 PROCEDURE — 93306 TRANSTHORACIC ECHO (TTE) COMPLETE (CUPID ONLY): ICD-10-PCS | Mod: 26,,, | Performed by: INTERNAL MEDICINE

## 2019-05-27 PROBLEM — M25.60 STIFFNESS IN JOINT: Status: RESOLVED | Noted: 2019-01-08 | Resolved: 2019-05-27

## 2019-05-27 PROBLEM — M25.532 ARTHRALGIA OF LEFT WRIST: Status: RESOLVED | Noted: 2019-01-08 | Resolved: 2019-05-27

## 2019-05-27 PROBLEM — M62.81 MUSCLE WEAKNESS: Status: RESOLVED | Noted: 2019-01-08 | Resolved: 2019-05-27

## 2019-06-01 ENCOUNTER — OFFICE VISIT (OUTPATIENT)
Dept: URGENT CARE | Facility: CLINIC | Age: 39
End: 2019-06-01
Payer: MEDICAID

## 2019-06-01 VITALS
WEIGHT: 170 LBS | BODY MASS INDEX: 32.1 KG/M2 | HEIGHT: 61 IN | TEMPERATURE: 99 F | RESPIRATION RATE: 18 BRPM | HEART RATE: 68 BPM | SYSTOLIC BLOOD PRESSURE: 148 MMHG | DIASTOLIC BLOOD PRESSURE: 98 MMHG | OXYGEN SATURATION: 99 %

## 2019-06-01 DIAGNOSIS — J02.9 SORE THROAT: ICD-10-CM

## 2019-06-01 DIAGNOSIS — J02.8 BACTERIAL PHARYNGITIS: Primary | ICD-10-CM

## 2019-06-01 DIAGNOSIS — B96.89 BACTERIAL PHARYNGITIS: Primary | ICD-10-CM

## 2019-06-01 LAB
CTP QC/QA: YES
S PYO RRNA THROAT QL PROBE: NEGATIVE

## 2019-06-01 PROCEDURE — 87880 POCT RAPID STREP A: ICD-10-PCS | Mod: QW,S$GLB,, | Performed by: PHYSICIAN ASSISTANT

## 2019-06-01 PROCEDURE — 99203 OFFICE O/P NEW LOW 30 MIN: CPT | Mod: S$GLB,,, | Performed by: PHYSICIAN ASSISTANT

## 2019-06-01 PROCEDURE — 99203 PR OFFICE/OUTPT VISIT, NEW, LEVL III, 30-44 MIN: ICD-10-PCS | Mod: S$GLB,,, | Performed by: PHYSICIAN ASSISTANT

## 2019-06-01 PROCEDURE — 87880 STREP A ASSAY W/OPTIC: CPT | Mod: QW,S$GLB,, | Performed by: PHYSICIAN ASSISTANT

## 2019-06-01 RX ORDER — AZITHROMYCIN 250 MG/1
TABLET, FILM COATED ORAL
Qty: 6 TABLET | Refills: 0 | Status: SHIPPED | OUTPATIENT
Start: 2019-06-01

## 2019-06-01 NOTE — PROGRESS NOTES
"Subjective:       Patient ID: Emily Mckeon is a 38 y.o. female.    Vitals:  height is 5' 1" (1.549 m) and weight is 77.1 kg (170 lb). Her temperature is 98.8 °F (37.1 °C). Her blood pressure is 148/98 (abnormal) and her pulse is 68. Her respiration is 18 and oxygen saturation is 99%.     Chief Complaint: Sore Throat    Sore Throat    This is a new problem. The current episode started in the past 7 days (2 days). The problem has been gradually worsening. There has been no fever. The pain is at a severity of 4/10. The pain is mild. Associated symptoms include neck pain and trouble swallowing. Pertinent negatives include no congestion, coughing, ear pain, shortness of breath, stridor or vomiting. She has had no exposure to strep or mono. She has tried nothing for the symptoms.       Constitution: Negative for chills, sweating, fatigue and fever.   HENT: Positive for sore throat and trouble swallowing. Negative for ear pain, congestion, sinus pain, sinus pressure and voice change.    Neck: Positive for neck pain. Negative for painful lymph nodes.   Eyes: Negative for eye redness.   Respiratory: Negative for chest tightness, cough, sputum production, bloody sputum, COPD, shortness of breath, stridor, wheezing and asthma.    Gastrointestinal: Negative for nausea and vomiting.   Musculoskeletal: Negative for muscle ache.   Skin: Negative for rash.   Allergic/Immunologic: Negative for seasonal allergies and asthma.   Hematologic/Lymphatic: Negative for swollen lymph nodes.       Objective:      Physical Exam   Constitutional: She is oriented to person, place, and time. She appears well-developed and well-nourished. She is cooperative.  Non-toxic appearance. She does not appear ill. No distress.   HENT:   Head: Normocephalic and atraumatic.   Right Ear: Hearing, tympanic membrane, external ear and ear canal normal.   Left Ear: Hearing, tympanic membrane, external ear and ear canal normal.   Nose: Nose normal. No " mucosal edema, rhinorrhea or nasal deformity. No epistaxis. Right sinus exhibits no maxillary sinus tenderness and no frontal sinus tenderness. Left sinus exhibits no maxillary sinus tenderness and no frontal sinus tenderness.   Mouth/Throat: Uvula is midline and mucous membranes are normal. No trismus in the jaw. Normal dentition. No uvula swelling. Posterior oropharyngeal erythema present. Tonsils are 2+ on the right. Tonsils are 2+ on the left. Tonsillar exudate.   Eyes: Conjunctivae and lids are normal. Right eye exhibits no discharge. Left eye exhibits no discharge. No scleral icterus.   Sclera clear bilat   Neck: Trachea normal, normal range of motion, full passive range of motion without pain and phonation normal. Neck supple.   Cardiovascular: Normal rate, regular rhythm, normal heart sounds, intact distal pulses and normal pulses.   Pulmonary/Chest: Effort normal and breath sounds normal. No accessory muscle usage or stridor. No respiratory distress. She has no decreased breath sounds. She has no wheezes. She has no rhonchi. She has no rales.   Abdominal: Soft. Normal appearance and bowel sounds are normal. She exhibits no distension, no pulsatile midline mass and no mass. There is no tenderness.   Musculoskeletal: Normal range of motion. She exhibits no edema or deformity.   Lymphadenopathy:        Head (right side): Tonsillar adenopathy present. No submental, no submandibular, no preauricular, no posterior auricular and no occipital adenopathy present.        Head (left side): Tonsillar adenopathy present. No submental, no submandibular, no preauricular, no posterior auricular and no occipital adenopathy present.     She has cervical adenopathy.        Right cervical: Superficial cervical and posterior cervical adenopathy present.        Left cervical: Superficial cervical and posterior cervical adenopathy present.   Neurological: She is alert and oriented to person, place, and time. She exhibits normal  muscle tone. Coordination normal.   Skin: Skin is warm, dry and intact. She is not diaphoretic. No pallor.   Psychiatric: She has a normal mood and affect. Her speech is normal and behavior is normal. Judgment and thought content normal. Cognition and memory are normal.   Nursing note and vitals reviewed.      Assessment:       1. Bacterial pharyngitis    2. Sore throat        Plan:         Bacterial pharyngitis  -     azithromycin (ZITHROMAX Z-JOHANNE) 250 MG tablet; Take 2 tablets (500 mg) on  Day 1,  followed by 1 tablet (250 mg) once daily on Days 2 through 5.  Dispense: 6 tablet; Refill: 0    Sore throat  -     POCT rapid strep A          Self-Care for Sore Throats    Sore throats happen for many reasons, such as colds, allergies, and infections caused by viruses or bacteria. In any case, your throat becomes red and sore. Your goal for self-care is to reduce your discomfort while giving your throat a chance to heal.  Moisten and soothe your throat  Tips include the following:  · Try a sip of water first thing after waking up.  · Keep your throat moist by drinking 6 or more glasses of clear liquids every day.  · Run a cool-air humidifier in your room overnight.  · Avoid cigarette smoke.   · Suck on throat lozenges, cough drops, hard candy, ice chips, or frozen fruit-juice bars. Use the sugar-free versions if your diet or medical condition requires them.  Gargle to ease irritation  Gargling every hour or 2 can ease irritation. Try gargling with 1 of these solutions:  · 1/4 teaspoon of salt in 1/2 cup of warm water  · An over-the-counter anesthetic gargle  Use medicine for more relief  Over-the-counter medicine can reduce sore throat symptoms. Ask your pharmacist if you have questions about which medicine to use:  · Ease pain with anesthetic sprays. Aspirin or an aspirin substitute also helps. Remember, never give aspirin to anyone 18 or younger, or if you are already taking blood thinners.   · For sore throats caused  by allergies, try antihistamines to block the allergic reaction.  · Remember: unless a sore throat is caused by a bacterial infection, antibiotics wont help you.  Prevent future sore throats  Prevention tips include the following:  · Stop smoking or reduce contact with secondhand smoke. Smoke irritates the tender throat lining.  · Limit contact with pets and with allergy-causing substances, such as pollen and mold.  · When youre around someone with a sore throat or cold, wash your hands often to keep viruses or bacteria from spreading.  · Dont strain your vocal cords.  Call your healthcare provider  Contact your healthcare provider if you have:  · A temperature over 101°F (38.3°C)  · White spots on the throat  · Great difficulty swallowing  · Trouble breathing  · A skin rash  · Recent exposure to someone else with strep bacteria  · Severe hoarseness and swollen glands in the neck or jaw   Date Last Reviewed: 8/1/2016  © 9844-1733 [employer]. [address] [place] [address]. All rights reserved. This information is not intended as a substitute for professional medical care. Always follow your healthcare professional's instructions.      Please follow up with your Primary care provider within 2-5 days if your signs and symptoms have not resolved or worsen.     If your condition worsens or fails to improve we recommend that you receive another evaluation at the emergency room immediately or contact your primary medical clinic to discuss your concerns.   You must understand that you have received an Urgent Care treatment only and that you may be released before all of your medical problems are known or treated. You, the patient, will arrange for follow up care as instructed.     RED FLAGS/WARNING SYMPTOMS DISCUSSED WITH PATIENT THAT WOULD WARRANT EMERGENT MEDICAL ATTENTION. PATIENT VERBALIZED UNDERSTANDING.     Elevated Blood Pressure  Your blood pressure was elevated during your visit to the  urgent care.  It was not so high that immediate care was needed but it is recommended that you monitor your blood pressure over the next week or two to make sure that it is not staying elevated.  Please have your blood pressure taken 2-3 times daily at different times of the day.  Write all of those blood pressures down and record the time that they were taken.  Keep all that information and take it with you to see your Primary Care Physician.  If your blood pressure is consistently above 140/90 you will need to follow up with your PCP more quickly

## 2019-06-01 NOTE — PATIENT INSTRUCTIONS
Self-Care for Sore Throats    Sore throats happen for many reasons, such as colds, allergies, and infections caused by viruses or bacteria. In any case, your throat becomes red and sore. Your goal for self-care is to reduce your discomfort while giving your throat a chance to heal.  Moisten and soothe your throat  Tips include the following:  · Try a sip of water first thing after waking up.  · Keep your throat moist by drinking 6 or more glasses of clear liquids every day.  · Run a cool-air humidifier in your room overnight.  · Avoid cigarette smoke.   · Suck on throat lozenges, cough drops, hard candy, ice chips, or frozen fruit-juice bars. Use the sugar-free versions if your diet or medical condition requires them.  Gargle to ease irritation  Gargling every hour or 2 can ease irritation. Try gargling with 1 of these solutions:  · 1/4 teaspoon of salt in 1/2 cup of warm water  · An over-the-counter anesthetic gargle  Use medicine for more relief  Over-the-counter medicine can reduce sore throat symptoms. Ask your pharmacist if you have questions about which medicine to use:  · Ease pain with anesthetic sprays. Aspirin or an aspirin substitute also helps. Remember, never give aspirin to anyone 18 or younger, or if you are already taking blood thinners.   · For sore throats caused by allergies, try antihistamines to block the allergic reaction.  · Remember: unless a sore throat is caused by a bacterial infection, antibiotics wont help you.  Prevent future sore throats  Prevention tips include the following:  · Stop smoking or reduce contact with secondhand smoke. Smoke irritates the tender throat lining.  · Limit contact with pets and with allergy-causing substances, such as pollen and mold.  · When youre around someone with a sore throat or cold, wash your hands often to keep viruses or bacteria from spreading.  · Dont strain your vocal cords.  Call your healthcare provider  Contact your healthcare provider if  you have:  · A temperature over 101°F (38.3°C)  · White spots on the throat  · Great difficulty swallowing  · Trouble breathing  · A skin rash  · Recent exposure to someone else with strep bacteria  · Severe hoarseness and swollen glands in the neck or jaw   Date Last Reviewed: 8/1/2016  © 4994-7246 [employer]. [address] [place] [address], [place] [address]. All rights reserved. This information is not intended as a substitute for professional medical care. Always follow your healthcare professional's instructions.      Please follow up with your Primary care provider within 2-5 days if your signs and symptoms have not resolved or worsen.     If your condition worsens or fails to improve we recommend that you receive another evaluation at the emergency room immediately or contact your primary medical clinic to discuss your concerns.   You must understand that you have received an Urgent Care treatment only and that you may be released before all of your medical problems are known or treated. You, the patient, will arrange for follow up care as instructed.     RED FLAGS/WARNING SYMPTOMS DISCUSSED WITH PATIENT THAT WOULD WARRANT EMERGENT MEDICAL ATTENTION. PATIENT VERBALIZED UNDERSTANDING.     Elevated Blood Pressure  Your blood pressure was elevated during your visit to the urgent care.  It was not so high that immediate care was needed but it is recommended that you monitor your blood pressure over the next week or two to make sure that it is not staying elevated.  Please have your blood pressure taken 2-3 times daily at different times of the day.  Write all of those blood pressures down and record the time that they were taken.  Keep all that information and take it with you to see your Primary Care Physician.  If your blood pressure is consistently above 140/90 you will need to follow up with your PCP more quickly

## 2019-07-22 ENCOUNTER — HOSPITAL ENCOUNTER (OUTPATIENT)
Dept: RADIOLOGY | Facility: HOSPITAL | Age: 39
Discharge: HOME | End: 2019-07-22
Attending: INTERNAL MEDICINE
Payer: MEDICAID

## 2019-07-22 DIAGNOSIS — M25.511 RIGHT SHOULDER PAIN: ICD-10-CM

## 2019-07-22 DIAGNOSIS — M25.511 RIGHT SHOULDER PAIN: Primary | ICD-10-CM

## 2019-07-22 PROCEDURE — 73030 X-RAY EXAM OF SHOULDER: CPT | Mod: TC,FY,RT

## 2019-07-22 PROCEDURE — 73030 X-RAY EXAM OF SHOULDER: CPT | Mod: 26,RT,, | Performed by: RADIOLOGY

## 2019-07-22 PROCEDURE — 73030 XR SHOULDER COMPLETE 2 OR MORE VIEWS RIGHT: ICD-10-PCS | Mod: 26,RT,, | Performed by: RADIOLOGY

## 2019-07-24 ENCOUNTER — HOSPITAL ENCOUNTER (EMERGENCY)
Facility: HOSPITAL | Age: 39
Discharge: HOME | End: 2019-07-24
Attending: EMERGENCY MEDICINE
Payer: MEDICAID

## 2019-07-24 VITALS
WEIGHT: 172 LBS | TEMPERATURE: 99 F | OXYGEN SATURATION: 100 % | SYSTOLIC BLOOD PRESSURE: 157 MMHG | DIASTOLIC BLOOD PRESSURE: 98 MMHG | RESPIRATION RATE: 17 BRPM | HEIGHT: 61 IN | HEART RATE: 72 BPM | BODY MASS INDEX: 32.47 KG/M2

## 2019-07-24 DIAGNOSIS — R07.89 ACUTE CHEST WALL PAIN: Primary | ICD-10-CM

## 2019-07-24 DIAGNOSIS — R07.9 CHEST PAIN: ICD-10-CM

## 2019-07-24 LAB
ANION GAP SERPL CALC-SCNC: 12 MMOL/L (ref 8–16)
B-HCG UR QL: NEGATIVE
BASOPHILS # BLD AUTO: 0.01 K/UL (ref 0–0.2)
BASOPHILS NFR BLD: 0.1 % (ref 0–1.9)
BUN SERPL-MCNC: 12 MG/DL (ref 6–20)
CALCIUM SERPL-MCNC: 10.1 MG/DL (ref 8.7–10.5)
CHLORIDE SERPL-SCNC: 103 MMOL/L (ref 95–110)
CO2 SERPL-SCNC: 25 MMOL/L (ref 23–29)
CREAT SERPL-MCNC: 0.7 MG/DL (ref 0.5–1.4)
CTP QC/QA: YES
D DIMER PPP IA.FEU-MCNC: 3.2 MG/L FEU
DIFFERENTIAL METHOD: ABNORMAL
EOSINOPHIL # BLD AUTO: 0 K/UL (ref 0–0.5)
EOSINOPHIL NFR BLD: 0 % (ref 0–8)
ERYTHROCYTE [DISTWIDTH] IN BLOOD BY AUTOMATED COUNT: 13.1 % (ref 11.5–14.5)
EST. GFR  (AFRICAN AMERICAN): >60 ML/MIN/1.73 M^2
EST. GFR  (NON AFRICAN AMERICAN): >60 ML/MIN/1.73 M^2
GLUCOSE SERPL-MCNC: 129 MG/DL (ref 70–110)
HCT VFR BLD AUTO: 40.6 % (ref 37–48.5)
HGB BLD-MCNC: 13 G/DL (ref 12–16)
IMM GRANULOCYTES # BLD AUTO: 0.09 K/UL (ref 0–0.04)
IMM GRANULOCYTES NFR BLD AUTO: 0.6 % (ref 0–0.5)
LYMPHOCYTES # BLD AUTO: 1.1 K/UL (ref 1–4.8)
LYMPHOCYTES NFR BLD: 6.8 % (ref 18–48)
MCH RBC QN AUTO: 31.3 PG (ref 27–31)
MCHC RBC AUTO-ENTMCNC: 32 G/DL (ref 32–36)
MCV RBC AUTO: 98 FL (ref 82–98)
MONOCYTES # BLD AUTO: 0.8 K/UL (ref 0.3–1)
MONOCYTES NFR BLD: 4.7 % (ref 4–15)
NEUTROPHILS # BLD AUTO: 14 K/UL (ref 1.8–7.7)
NEUTROPHILS NFR BLD: 87.8 % (ref 38–73)
NRBC BLD-RTO: 0 /100 WBC
PLATELET # BLD AUTO: 250 K/UL (ref 150–350)
PMV BLD AUTO: 10.5 FL (ref 9.2–12.9)
POTASSIUM SERPL-SCNC: 3.8 MMOL/L (ref 3.5–5.1)
RBC # BLD AUTO: 4.15 M/UL (ref 4–5.4)
SODIUM SERPL-SCNC: 140 MMOL/L (ref 136–145)
TROPONIN I SERPL DL<=0.01 NG/ML-MCNC: 0.01 NG/ML (ref 0–0.03)
WBC # BLD AUTO: 15.97 K/UL (ref 3.9–12.7)

## 2019-07-24 PROCEDURE — 93005 ELECTROCARDIOGRAM TRACING: CPT

## 2019-07-24 PROCEDURE — 85025 COMPLETE CBC W/AUTO DIFF WBC: CPT

## 2019-07-24 PROCEDURE — 25000003 PHARM REV CODE 250: Performed by: EMERGENCY MEDICINE

## 2019-07-24 PROCEDURE — 80048 BASIC METABOLIC PNL TOTAL CA: CPT

## 2019-07-24 PROCEDURE — 99284 EMERGENCY DEPT VISIT MOD MDM: CPT | Mod: ,,, | Performed by: EMERGENCY MEDICINE

## 2019-07-24 PROCEDURE — 99284 PR EMERGENCY DEPT VISIT,LEVEL IV: ICD-10-PCS | Mod: ,,, | Performed by: EMERGENCY MEDICINE

## 2019-07-24 PROCEDURE — 25500020 PHARM REV CODE 255: Performed by: EMERGENCY MEDICINE

## 2019-07-24 PROCEDURE — 93010 EKG 12-LEAD: ICD-10-PCS | Mod: ,,, | Performed by: INTERNAL MEDICINE

## 2019-07-24 PROCEDURE — 81025 URINE PREGNANCY TEST: CPT | Performed by: EMERGENCY MEDICINE

## 2019-07-24 PROCEDURE — 93010 ELECTROCARDIOGRAM REPORT: CPT | Mod: ,,, | Performed by: INTERNAL MEDICINE

## 2019-07-24 PROCEDURE — 99285 EMERGENCY DEPT VISIT HI MDM: CPT | Mod: 25

## 2019-07-24 PROCEDURE — 84484 ASSAY OF TROPONIN QUANT: CPT

## 2019-07-24 PROCEDURE — 85379 FIBRIN DEGRADATION QUANT: CPT

## 2019-07-24 RX ORDER — LIDOCAINE 50 MG/G
1 PATCH TOPICAL
Status: DISCONTINUED | OUTPATIENT
Start: 2019-07-24 | End: 2019-07-25 | Stop reason: HOSPADM

## 2019-07-24 RX ORDER — METHOCARBAMOL 750 MG/1
500 TABLET, FILM COATED ORAL 4 TIMES DAILY
COMMUNITY

## 2019-07-24 RX ORDER — ONDANSETRON 4 MG/1
4 TABLET, ORALLY DISINTEGRATING ORAL
Status: COMPLETED | OUTPATIENT
Start: 2019-07-24 | End: 2019-07-24

## 2019-07-24 RX ORDER — NAPROXEN 500 MG/1
500 TABLET ORAL 2 TIMES DAILY
COMMUNITY

## 2019-07-24 RX ORDER — PREDNISONE 20 MG/1
20 TABLET ORAL DAILY
COMMUNITY

## 2019-07-24 RX ADMIN — IOHEXOL 100 ML: 350 INJECTION, SOLUTION INTRAVENOUS at 10:07

## 2019-07-24 RX ADMIN — ONDANSETRON HYDROCHLORIDE 4 MG: 4 TABLET, ORALLY DISINTEGRATING ORAL at 07:07

## 2019-07-24 RX ADMIN — LIDOCAINE 1 PATCH: 50 PATCH CUTANEOUS at 08:07

## 2019-07-24 NOTE — ED PROVIDER NOTES
Encounter Date: 2019    SCRIBE #1 NOTE: I, Arnie Almazan, am scribing for, and in the presence of,  Dr. Painter. I have scribed the following portions of the note - Other sections scribed: HPI, ROS, PE.       History     Chief Complaint   Patient presents with    Chest Pain     right sided of chest started out as arm pain.     38 y.o. female with history of HTN, elevated cholesterol, heart murmur, and fibroids presenting with chest pain with onset yesterday. Patient reports symptoms of chest tightness, SOB, and a shooting pain down her right arm and neck. Patient reports the feeling similar to a pinched nerve. Patient reports going to the hospital the day before her ED visit and receiving medications of prednisone, methocarbamol, naproxen, and a shot of toradol. Patient reports allergies to morphine, penicillin, and meperidine. Patient reports nausea after taking current medications this morning. Patient currently denies nausea, vomiting, diarrhea, fever, cough, abdominal pain, or dysuria. No right arm or chest trauma.  A ten point review of systems was completed and is negative except as documented above.  Patient denies any other acute medical complaint. The patients available PMH, PSH, Social History, medications, allergies, and triage vital signs were reviewed just prior to their medical evaluation.              The history is provided by the patient and medical records.     Review of patient's allergies indicates:   Allergen Reactions    Morphine Hives    Pcn [penicillins]     Penicillin Hives    Meperidine Hives and Rash     Past Medical History:   Diagnosis Date    Chronic back pain     Elevated cholesterol     Fibroids     Hypertension     Impaired glucose in pregnancy, antepartum     Migraine      Past Surgical History:   Procedure Laterality Date     SECTION, CLASSIC      x4; all at term    DELIVERY- SECTION N/A 3/1/2018    Performed by Iliana Dom[facility]nguez MD at [facility] L[facility]D     EXCISION, CYST N/A 3/8/2019    Performed by [facility]alba MD at [facility] OR    LIPOSUCTION W/ FAT INJECTION      WRIST SURGERY       Family History   Problem Relation Age of Onset    Breast cancer Neg Hx     Colon cancer Neg Hx     Ovarian cancer Neg Hx      Social History     Tobacco Use    Smoking status: Never Smoker    Smokeless tobacco: Never Used   Substance Use Topics    Alcohol use: No    Drug use: No     Review of Systems   Constitutional: Negative for fever.   HENT: Negative for sore throat.    Eyes: Negative for visual disturbance.   Respiratory: Negative for cough and shortness of breath.    Cardiovascular: Positive for chest pain. Negative for leg swelling.   Gastrointestinal: Negative for abdominal pain, diarrhea, nausea and vomiting.   Genitourinary: Negative for dysuria.   Musculoskeletal: Positive for myalgias and neck pain.   Skin: Negative for rash and wound.   Allergic/Immunologic: Negative for immunocompromised state.   Neurological: Negative for syncope.   Psychiatric/Behavioral: Negative for confusion.   All other systems reviewed and are negative.      Physical Exam     Initial Vitals [07/24/19 1716]   BP Pulse Resp Temp SpO2   (!) 201/98 71 18 98.7 °F (37.1 °C) 98 %      MAP       --         Physical Exam    Nursing note and vitals reviewed.  Constitutional: She appears well-developed and well-nourished. She is not diaphoretic. No distress.   HENT:   Head: Normocephalic and atraumatic.   Nose: Nose normal.   Eyes: Conjunctivae are normal. Right eye exhibits no discharge. Left eye exhibits no discharge.   Neck: Normal range of motion. Neck supple.   Cardiovascular: Normal rate, regular rhythm, normal heart sounds and intact distal pulses. Exam reveals no gallop and no friction rub.    No murmur heard.  Pulmonary/Chest: Breath sounds normal. No respiratory distress. She has no wheezes. She has no rhonchi. She has no rales.   Musculoskeletal: Normal range of motion. She exhibits  tenderness. She exhibits no edema.   Tender palpations between ribs and chest wall.  Normal right rotator cuff.  No right trapezius strain.   Neurological: She is alert and oriented to person, place, and time. She has normal strength. GCS score is 15. GCS eye subscore is 4. GCS verbal subscore is 5. GCS motor subscore is 6.   Skin: Skin is warm and dry. No rash noted. No erythema.   Psychiatric: She has a normal mood and affect. Her behavior is normal. Judgment and thought content normal.         ED Course   Procedures  Labs Reviewed   BASIC METABOLIC PANEL - Abnormal; Notable for the following components:       Result Value    Glucose 129 (*)     All other components within normal limits   CBC W/ AUTO DIFFERENTIAL - Abnormal; Notable for the following components:    WBC 15.97 (*)     Mean Corpuscular Hemoglobin 31.3 (*)     Immature Granulocytes 0.6 (*)     Gran # (ANC) 14.0 (*)     Immature Grans (Abs) 0.09 (*)     Gran% 87.8 (*)     Lymph% 6.8 (*)     All other components within normal limits   D DIMER, QUANTITATIVE - Abnormal; Notable for the following components:    D-Dimer 3.20 (*)     All other components within normal limits   TROPONIN I   POCT URINE PREGNANCY     EKG Readings: (Independently Interpreted)   See separate note     ECG Results          EKG 12-lead (Final result)  Result time 07/25/19 12:48:08    Final result by Interface, Lab In [facility] (07/25/19 12:48:08)                 Narrative:    Test Reason : R07.9,    Vent. Rate : 072 BPM     Atrial Rate : 072 BPM     P-R Int : 150 ms          QRS Dur : 084 ms      QT Int : 388 ms       P-R-T Axes : 060 006 058 degrees     QTc Int : 424 ms    Normal sinus rhythm with sinus arrhythmia  Possible Left atrial enlargement  LVH  Septal infarct ,age undetermined  Abnormal ECG  When compared with ECG of 25-FEB-2019 16:02,  No significant change was found  Confirmed by Elizabeth Lewis MD ([id number]) on 7/25/2019 12:48:00 PM    Referred By: [email or web address]   SELF            Confirmed By:Elizabeth Lewis MD                            Imaging Results          CTA Chest Non-Coronary (PE Study) (Final result)  Result time 07/24/19 22:24:14    Final result by Armen Dutta MD (07/24/19 22:24:14)                 Impression:      No evidence of acute pulmonary embolus.      Electronically signed by: Armen Dutta MD  Date:    07/24/2019  Time:    22:24             Narrative:    EXAMINATION:  CTA CHEST NON CORONARY    CLINICAL HISTORY:  Chest pain, acute, PE suspected, intermed prob, positive D-dimer;    TECHNIQUE:  Low dose axial images, sagittal and coronal reformations were obtained from the thoracic inlet to the lung bases following the IV administration of 100 mL of Omnipaque 350.  Contrast timing was optimized to evaluate the pulmonary arteries.  MIP images were performed.    COMPARISON:  Chest radiograph, 07/24/2019.  CTA chest, 05/30/2017.    FINDINGS:  Examination of the soft tissue and vascular structures at the base of the neck is unremarkable.    The thoracic aorta maintains normal caliber, contour, and course without significant atherosclerotic calcification.  There is no evidence of aneurysmal dilation or dissection.    The pulmonary arteries distribute normally without evidence of filling defect to indicate pulmonary thromboembolism.    The trachea and proximal airways are patent.    The lungs are symmetrically expanded and without evidence of consolidation, pneumothorax, mass, or significant pleural thickening.  No evidence of pleural fluid.    The heart is not enlarged.  No pericardial effusion.    There is no axillary, mediastinal, or hilar lymph node enlargement.    The esophagus maintains a normal course and caliber.    Limited images of the upper abdomen obtained during the course of this dedicated thoracic CT is negative for acute findings.  There is nonspecific wall thickening at the gastroesophageal junction, slightly more pronounced when compared with the prior  study.    The osseous structures are within expected limits.  Postoperative changes are present in the abdominal wall.                               X-Ray Chest PA And Lateral (Final result)  Result time 07/24/19 19:00:52    Final result by Roberto Burgos MD (07/24/19 19:00:52)                 Impression:      No acute abnormality.      Electronically signed by: Roberto Burgos MD  Date:    07/24/2019  Time:    19:00             Narrative:    EXAMINATION:  XR CHEST PA AND LATERAL    CLINICAL HISTORY:  Other chest pain    TECHNIQUE:  PA and lateral views of the chest were performed.    COMPARISON:  05/30/2017    FINDINGS:  The lungs are clear, with normal appearance of pulmonary vasculature and no pleural effusion or pneumothorax.    The cardiac silhouette is normal in size. The hilar and mediastinal contours are unremarkable.    Bones are intact.                              X-Rays:   Independently Interpreted Readings:   Other Readings:  Reviewed CT images, no saddle PE    Medical Decision Making:   History:   Old Medical Records: I decided to obtain old medical records.  Independently Interpreted Test(s):   I have ordered and independently interpreted X-rays - see prior notes.  I have ordered and independently interpreted EKG Reading(s) - see prior notes  Clinical Tests:   Lab Tests: Ordered and Reviewed  Radiological Study: Ordered and Reviewed  Medical Tests: Ordered and Reviewed  ED Management:  38 Year old female presents with right chest wall pain.  Pleuritic in nature.  Vitals with hypertension.  Physical exam otherwise benign.  Labs with elevated D-dimer.  EKG without evidence of acute ischemia.  CT chest without PE.  Doubt ACS, PE, dissection, PNX, PNA, or tamponade.  Patient will continue on her home course of medications.  We reviewed her pain medications extensively at bedside.  She will call her regular physician tomorrow and consider physical therapy.  Patient will return to ED for worsening  symptoms, inability to eat/drink, fever greater than 100.4, or any other concerns.  Did bedside teaching with return precautions.  All questions answered.  The patient acknowledges understanding.  Gave verbal discharge instructions.            Scribe Attestation:   Scribe #1: I performed the above scribed service and the documentation accurately describes the services I performed. I attest to the accuracy of the note.               Clinical Impression:       ICD-10-CM ICD-9-CM   1. Acute chest wall pain R07.89 786.52   2. Chest pain R07.9 786.50         Disposition:   Disposition: Discharged  Condition: Stable                        Brent Painter MD  07/25/19 [id number]

## 2019-07-24 NOTE — ED TRIAGE NOTES
Presents with complaints of chest pain that began this morning and shoulder/neck pain that has persisted for a week. Reports feeling restless and having intermittent nausea.

## 2019-07-24 NOTE — ED NOTES
Patient identifiers for Emily Rubalcava Mckeon 38 y.o. female checked and correct.  Chief Complaint   Patient presents with    Chest Pain     right sided of chest started out as arm pain.     Past Medical History:   Diagnosis Date    Chronic back pain     Elevated cholesterol     Fibroids     Hypertension     Impaired glucose in pregnancy, antepartum     Migraine      Allergies reported:   Review of patient's allergies indicates:   Allergen Reactions    Morphine Hives    Pcn [penicillins]     Penicillin Hives    Meperidine Hives and Rash         LOC: Patient is awake, alert, and aware of environment with an appropriate affect. Patient is oriented x 3 and speaking appropriately. Reports increase in agitation; feeling restless and unable to sit still.  APPEARANCE: Patient resting comfortably and in no acute distress. Patient is clean and well groomed, patient's clothing is properly fastened.  SKIN: The skin is warm and dry. Patient has normal skin turgor and moist mucus membranes. Skin is intact; no bruising or breakdown noted.  MUSKULOSKELETAL: Patient is moving all extremities well, no obvious deformities noted. Pulses intact. Reports shoulder and neck pain that has persisted for a week.   RESPIRATORY: Airway is open and patent. Respirations are spontaneous and non-labored with normal effort and rate.  CARDIAC: Patient has a normal rate and rhythm. No peripheral edema noted. States chest pains began this morning; 10/10 pain.  ABDOMEN: No distention noted. Soft and non-tender upon palpation. Reports intermittent nausea that began this morning.  NEUROLOGICAL: PERRL. Facial expression is symmetrical. Hand grasps are equal bilaterally. Normal sensation in all extremities when touched with finger. Reports seeing spots.

## 2019-07-24 NOTE — EKG INTERPRETATIONS - EMERGENCY DEPT.
Independently interpreted by MD:  Rate 72, NSR, no stemi, no ectopy, LAE, poor qrs progression in V3-4

## 2019-07-25 DIAGNOSIS — R07.9 CHEST PAIN, UNSPECIFIED TYPE: Primary | ICD-10-CM

## 2019-07-25 NOTE — DISCHARGE INSTRUCTIONS
Take all medications as ordered.    Our goal in the emergency department is to always give you outstanding care and exceptional service. You may receive a survey by mail or e-mail in the next week regarding your experience in our ED. We would greatly appreciate your completing and returning the survey. Your feedback provides us with a way to recognize our staff who give very good care and it helps us learn how to improve when your experience was below our aspiration of excellence.

## 2019-07-25 NOTE — ED NOTES
Report received from PRIYA Funez. Care assumed. Pt resting comfortably and independently repositioned in stretcher with bed locked in lowest position for safety. NAD and patient states she feels a little better. Respirations even and unlabored and visible chest rise noted. Patient offered bathroom assistance and denies need at this time. Pt instructed to call if assistance is needed.. No needs at this time. Will continue to monitor. Call light within reach. Family at bedside.

## 2019-07-26 ENCOUNTER — TELEPHONE (OUTPATIENT)
Dept: ORTHOPEDICS | Facility: CLINIC | Age: 39
End: 2019-07-26

## 2019-07-26 NOTE — TELEPHONE ENCOUNTER
Returning call to patient.  Explained that we have not received fax with referral from her PCP.  Provided her the clinic fax # [phone].  Will send referral to pre-service department once received.

## 2021-02-06 NOTE — H&P
[facility]  Obstetrics  History & Physical    Patient Name: Emily Mckeon  MRN: [id number]  Admission Date: 3/1/2018  Primary Care Provider: Primary Doctor No    Subjective:     Principal Problem: uterine contractions in third trimester, antepartum    History of Present Illness:  Emily Mckeon is a 37 y.o. [id number] at 35w1d presented to [facility] complaining of very painful uterine contractions for most of the day.     This IUP is complicated by CHTN (no meds, CHAP trial), hx of 3 prior c/s (significant scar tissue per patient), HLD, migraines, fibroid uterus, anemia.    Patient reports contractions have become more painful and more frequent throughout the day. Pain is 9/10. States she continues to have pain even when she is not jimbo. States she is breathing hard and unable to sit still secondary to the pain.  Denies vaginal bleeding, denies LOF.   Fetal Movement: normal.  Patient denies headache, scotoma, RUQ pain, N/V, CP, SOB.        Obstetric History       T3      L3     SAB1   TAB0   Ectopic0   Multiple0   Live Births3       # Outcome Date GA Lbr Alejo/2nd Weight Sex Delivery Anes PTL Lv   5 Current            4 SAB            3 Term 10/08/03 39w0d  2.722 kg (6 lb) M CS-LTranv EPI N EMILY   2 Term 00 39w0d  2.722 kg (6 lb) F CS-LTranv EPI N EMILY   1 Term 99 40w0d  2.722 kg (6 lb) M CS-LTranv   EMILY      Complications: Failure to progress in labor        Past Medical History:   Diagnosis Date    Chronic back pain     Elevated cholesterol     Hypertension     Impaired glucose in pregnancy, antepartum     Migraine      Past Surgical History:   Procedure Laterality Date     SECTION, CLASSIC      x3; all at term    LIPOSUCTION W/ FAT INJECTION         PTA Medications   Medication Sig    ACETAMINOPHEN (TYLENOL ORAL) Take by mouth.    aspirin 81 MG Chew Take 81 mg by mouth once daily.    cetirizine (ZYRTEC) 10 MG tablet Take 1 tablet (10 mg total) by  mouth once daily.    ferrous sulfate 325 mg (65 mg iron) Tab tablet Take 1 tablet (325 mg total) by mouth once daily.    fluticasone (FLONASE) 50 mcg/actuation nasal spray 1 spray by Each Nare route 2 (two) times daily as needed.    magnesium oxide (MAGOX) 400 mg tablet Take 1 tablet (400 mg total) by mouth once daily.    metoprolol succinate (TOPROL-XL) 25 MG 24 hr tablet Take 1 tablet (25 mg total) by mouth once daily.       Review of patient's allergies indicates:   Allergen Reactions    Demerol [meperidine] Hives    Morphine Hives    Pcn [penicillins]         Family History     None        Social History Main Topics    Smoking status: Never Smoker    Smokeless tobacco: Never Used    Alcohol use No    Drug use: No    Sexual activity: Yes     Partners: Male     Birth control/ protection: None     Review of Systems   Constitutional: Negative for activity change, appetite change, fatigue and fever.   Respiratory: Negative for cough and shortness of breath.    Cardiovascular: Negative for chest pain and palpitations.   Gastrointestinal: Positive for abdominal pain. Negative for constipation, diarrhea, nausea and vomiting.   Genitourinary: Negative for dysuria, frequency, pelvic pain, vaginal bleeding and vaginal discharge.   Musculoskeletal: Positive for back pain.   Neurological: Negative for headaches.   Psychiatric/Behavioral: Negative for depression. The patient is not nervous/anxious.    Breast: Negative for breast mass and breast pain     Objective:     Vital Signs (Most Recent):  Temp: 97.5 °F (36.4 °C) (03/01/18 2010)  Pulse: 78 (03/01/18 2220)  Resp: 18 (03/01/18 2207)  BP: 127/79 (03/01/18 2207)  SpO2: 100 % (03/01/18 2220) Vital Signs (24h Range):  Temp:  [97.5 °F (36.4 °C)-98 °F (36.7 °C)] 97.5 °F (36.4 °C)  Pulse:  [] 78  Resp:  [18] 18  SpO2:  [100 %] 100 %  BP: (127-148)/(79-97) 127/79     Weight: 74.8 kg (165 lb)  Body mass index is 31.18 kg/m².    FHT: 135bpm Cat 1  (reassuring)  TOCO: Q 2 minutes    Physical Exam:   Constitutional: She is oriented to person, place, and time. She appears well-developed and well-nourished. She appears distressed (secodnary to pain, writhing in bed, breathing through contractions).    HENT:   Head: Normocephalic and atraumatic.     Neck: Normal range of motion.    Cardiovascular: Normal rate, regular rhythm and normal heart sounds.     Pulmonary/Chest: Effort normal and breath sounds normal.        Abdominal: Soft. Bowel sounds are normal. She exhibits distension (gravid). There is tenderness (uterine tenderness between contractions).             Musculoskeletal: Normal range of motion and moves all extremeties.       Neurological: She is alert and oriented to person, place, and time.    Skin: Skin is warm and dry.    Psychiatric: She has a normal mood and affect.       Cervix:  Checked x 2 in triage (2h apart) 0/60/-3 --> 0/80/-3, cervix behind pubic symphysis, very soft    Presentation: Vertex     Significant Labs:  Lab Results   Component Value Date    GROUPTRH A POS 2018    HEPBSAG Negative 08/15/2017       I have personallly reviewed all pertinent lab results from the last 24 hours.    Assessment/Plan:     37 y.o. female  at 35w1d for:    *  uterine contractions in third trimester, antepartum    - Patient was monitored in [facility] for >2h. Minimal cervical change was noted, but regular ctx 1-2 minutes apart continued despite terb and IVF. 1 dose of BMZ given in triage. Discussed risks of early delivery versus risk of uterine dehiscense/rupture given patients history of 3 prior c/s and significant scar tissue noted on last c/s.  - Patient is very uncomfortable, writhing in pain with contractions, and pain remains when not jimbo. Uterine tenderness on exam, cervical effacement noted. Reassuring fetal status.  - Discussed with Dr. Perez and Dr. Gutierrez ([facility]), who recommend RLTCS tonight.  - Patient in agreement with  plan.    - Consents signed and to chart  - Admit to Labor and Delivery unit  - Epidural per Anesthesia  - Draw CBC, T&S, 2u pRBC held  - Gent/clinda OCTOR  - To OR for C/S. Case Request is in.   - Notify Staff  - Ultrasound performed, infant in vertex position.   - Post-Partum Hemorrhage risk - low        Iron deficiency anemia secondary to inadequate dietary iron intake    - H/H 7.3/25.6  - 2u pRBC held        H/O  section x3    - anticipate significant amount of scar tissue given reported history per patient  - 2u pRBC held  - concern for possible uterine dehiscence given pain level        Chronic hypertension affecting pregnancy    - CHAP trial  - appropriate labs drawn per protocol  - BP mild range on admit  - PreE labs ordered  - Neg PreE ROS        High-risk pregnancy in second trimester    - Reviewed case with MFDESEAN Velasco MD  Obstetrics  [facility]       normal...

## 2021-04-27 ENCOUNTER — PATIENT MESSAGE (OUTPATIENT)
Dept: ALLERGY | Facility: CLINIC | Age: 41
End: 2021-04-27

## 2022-01-26 ENCOUNTER — PATIENT MESSAGE (OUTPATIENT)
Dept: PRIMARY CARE CLINIC | Facility: CLINIC | Age: 42
End: 2022-01-26
Payer: MEDICAID

## 2022-02-09 ENCOUNTER — LAB VISIT (OUTPATIENT)
Dept: LAB | Facility: HOSPITAL | Age: 42
End: 2022-02-09
Attending: INTERNAL MEDICINE
Payer: MEDICAID

## 2022-02-09 DIAGNOSIS — E78.2 MIXED HYPERLIPIDEMIA: Primary | ICD-10-CM

## 2022-02-09 DIAGNOSIS — Z11.59 SCREENING EXAMINATION FOR POLIOMYELITIS: ICD-10-CM

## 2022-02-09 DIAGNOSIS — E53.8 VITAMIN B12 DEFICIENCY: ICD-10-CM

## 2022-02-09 DIAGNOSIS — D64.9 ANEMIA, UNSPECIFIED: ICD-10-CM

## 2022-02-09 DIAGNOSIS — Z12.31 SCREENING MAMMOGRAM FOR HIGH-RISK PATIENT: Primary | ICD-10-CM

## 2022-02-09 LAB
FERRITIN SERPL-MCNC: 13 NG/ML (ref 20–300)
T4 FREE SERPL-MCNC: 0.83 NG/DL (ref 0.71–1.51)
TSH SERPL DL<=0.005 MIU/L-ACNC: 1.23 UIU/ML (ref 0.4–4)
VIT B12 SERPL-MCNC: 798 PG/ML (ref 210–950)

## 2022-02-09 PROCEDURE — 86592 SYPHILIS TEST NON-TREP QUAL: CPT | Performed by: INTERNAL MEDICINE

## 2022-02-09 PROCEDURE — 84443 ASSAY THYROID STIM HORMONE: CPT | Performed by: INTERNAL MEDICINE

## 2022-02-09 PROCEDURE — 87340 HEPATITIS B SURFACE AG IA: CPT | Performed by: INTERNAL MEDICINE

## 2022-02-09 PROCEDURE — 84439 ASSAY OF FREE THYROXINE: CPT | Performed by: INTERNAL MEDICINE

## 2022-02-09 PROCEDURE — 82607 VITAMIN B-12: CPT | Performed by: INTERNAL MEDICINE

## 2022-02-09 PROCEDURE — 87389 HIV-1 AG W/HIV-1&-2 AB AG IA: CPT | Performed by: INTERNAL MEDICINE

## 2022-02-09 PROCEDURE — 36415 COLL VENOUS BLD VENIPUNCTURE: CPT | Performed by: INTERNAL MEDICINE

## 2022-02-09 PROCEDURE — 82728 ASSAY OF FERRITIN: CPT | Performed by: INTERNAL MEDICINE

## 2022-02-09 PROCEDURE — 86803 HEPATITIS C AB TEST: CPT | Performed by: INTERNAL MEDICINE

## 2022-02-10 ENCOUNTER — HOSPITAL ENCOUNTER (OUTPATIENT)
Dept: RADIOLOGY | Facility: HOSPITAL | Age: 42
Discharge: HOME | End: 2022-02-10
Attending: INTERNAL MEDICINE
Payer: MEDICAID

## 2022-02-10 VITALS — WEIGHT: 158 LBS | BODY MASS INDEX: 29.85 KG/M2

## 2022-02-10 DIAGNOSIS — D50.8 IRON DEFICIENCY ANEMIA SECONDARY TO INADEQUATE DIETARY IRON INTAKE: Primary | ICD-10-CM

## 2022-02-10 DIAGNOSIS — Z12.31 SCREENING MAMMOGRAM FOR HIGH-RISK PATIENT: ICD-10-CM

## 2022-02-10 DIAGNOSIS — N92.1 MENORRHAGIA WITH IRREGULAR CYCLE: ICD-10-CM

## 2022-02-10 LAB
HBV SURFACE AG SERPL QL IA: NEGATIVE
HCV AB SERPL QL IA: NEGATIVE
HIV 1+2 AB+HIV1 P24 AG SERPL QL IA: NEGATIVE
RPR SER QL: NORMAL

## 2022-02-10 PROCEDURE — 77067 SCR MAMMO BI INCL CAD: CPT | Mod: 26,,, | Performed by: RADIOLOGY

## 2022-02-10 PROCEDURE — 77063 MAMMO DIGITAL SCREENING BILAT WITH TOMO: ICD-10-PCS | Mod: 26,,, | Performed by: RADIOLOGY

## 2022-02-10 PROCEDURE — 77067 SCR MAMMO BI INCL CAD: CPT | Mod: TC,PN

## 2022-02-10 PROCEDURE — 77063 BREAST TOMOSYNTHESIS BI: CPT | Mod: TC,PN

## 2022-02-10 PROCEDURE — 77067 MAMMO DIGITAL SCREENING BILAT WITH TOMO: ICD-10-PCS | Mod: 26,,, | Performed by: RADIOLOGY

## 2022-02-10 PROCEDURE — 77063 BREAST TOMOSYNTHESIS BI: CPT | Mod: 26,,, | Performed by: RADIOLOGY

## 2022-02-10 RX ORDER — HEPARIN 100 UNIT/ML
500 SYRINGE INTRAVENOUS
Status: CANCELLED | OUTPATIENT
Start: 2022-02-11

## 2022-02-10 RX ORDER — SODIUM CHLORIDE 0.9 % (FLUSH) 0.9 %
10 SYRINGE (ML) INJECTION
Status: CANCELLED | OUTPATIENT
Start: 2022-02-11

## 2022-09-10 NOTE — ED PROVIDER NOTES
Encounter Date: 2018       History     Chief Complaint   Patient presents with    Wrist Pain     L wrist , denies injury     Ms. Mckeon is a 37 y.o. lady [id number] at 33 weeks who presents for left wrist/thumb pain x 2 weeks. Patient has carpal tunnel in her right wrist, and endorses favoring her left hand for all activities. 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 Pain is described as sharp and aching, worse with thumb/wrist, relieved with rest and is different than the burning pain she experiences with carpal tunnel on the right.. She denies any recent trauma, falls, or hyperextension/flexion of the wrist. She also denies any burning, numbness or tingling of the left hand/wrist.           Review of patient's allergies indicates:   Allergen Reactions    Demerol [meperidine] Hives    Morphine Hives    Pcn [penicillins]      Past Medical History:   Diagnosis Date    Chronic back pain     Elevated cholesterol     Hypertension     Impaired glucose in pregnancy, antepartum     Migraine      Past Surgical History:   Procedure Laterality Date     SECTION, CLASSIC      x3; all at term    LIPOSUCTION W/ FAT INJECTION       Family History   Problem Relation Age of Onset    Breast cancer Neg Hx     Colon cancer Neg Hx     Ovarian cancer Neg Hx      Social History   Substance Use Topics    Smoking status: Never Smoker    Smokeless tobacco: Never Used    Alcohol use No     Review of Systems   Constitutional: Negative for chills and fever.   HENT: Negative for congestion and sore throat.    Eyes: Negative for photophobia and visual disturbance.   Respiratory: Negative for cough, chest tightness and shortness of breath.    Cardiovascular: Negative for chest pain and palpitations.   Gastrointestinal: Negative for abdominal pain, constipation, diarrhea, nausea and vomiting.   Genitourinary: Negative for dysuria and hematuria.   Musculoskeletal: Negative for  arthralgias, back pain and myalgias.   Skin: Negative for color change and rash.   Neurological: Negative for dizziness, weakness, light-headedness and headaches.       Physical Exam     Initial Vitals [02/16/18 0742]   BP Pulse Resp Temp SpO2   125/72 97 18 97.8 °F (36.6 °C) 100 %      MAP       89.67         Physical Exam    Vitals reviewed.  Constitutional: She appears well-developed and well-nourished. She is not diaphoretic. No distress.   HENT:   Head: Normocephalic and atraumatic.   Mouth/Throat: Oropharynx is clear and moist.   Eyes: Conjunctivae and EOM are normal. Pupils are equal, round, and reactive to light.   Neck: Normal range of motion. Neck supple.   Cardiovascular: Normal rate, regular rhythm, normal heart sounds and intact distal pulses.   Pulmonary/Chest: Breath sounds normal. No respiratory distress.   Abdominal: Soft. Bowel sounds are normal.   Pregnant female.    Musculoskeletal: Normal range of motion. She exhibits tenderness. She exhibits no edema.   Mild TTP of the left 1st MCP. Finkelstein test (+) on the left. No obvious deformities, erythema or edema. Numbness/tingling of the 1st/2nd phalange on the right.    Neurological: She is alert and oriented to person, place, and time.   Skin: Skin is warm and dry. Capillary refill takes less than 2 seconds. No rash noted. No erythema.         ED Course   Procedures  Labs Reviewed - No data to display          Medical Decision Making:   Initial Assessment:   36 y/o F presents with left wrist hand pain.   Differential Diagnosis:   DDx includes De quervain tenosynovitis, arthritis, carpal tunnel, tendinitis, contusion              Attending Attestation:   Physician Attestation Statement for Resident:  As the supervising MD   Physician Attestation Statement: I have personally seen and examined this patient.   I agree with the above history. -: 38 yo f, 32 WGA pregnant, here with L wrist pain x 3 weeks, atraumatic, no infectious sx.  Tenderness along  thumb and wrist, + finkelsteins test.  Suspect deQuervains syndrome.  Plan for tylenol, supportive care, hand clinic f/u   As the supervising MD I agree with the above PE.    As the supervising MD I agree with the above treatment, course, plan, and disposition.                       Clinical Impression:   The encounter diagnosis was Tendinitis, de Quervain's.                           Monica Mon MD  02/17/18 0709     no

## 2023-04-26 ENCOUNTER — TELEPHONE (OUTPATIENT)
Dept: GASTROENTEROLOGY | Facility: CLINIC | Age: 43
End: 2023-04-26
Payer: MEDICAID

## 2023-06-09 ENCOUNTER — TELEPHONE (OUTPATIENT)
Dept: RESEARCH | Facility: HOSPITAL | Age: 43
End: 2023-06-09
Payer: MEDICAID

## 2023-09-25 ENCOUNTER — TELEPHONE (OUTPATIENT)
Dept: RESEARCH | Facility: OTHER | Age: 43
End: 2023-09-25
Payer: MEDICAID

## 2023-09-25 NOTE — TELEPHONE ENCOUNTER
[facility] Maternal F/U (2021.238) Note:     Called patient to introduce [facility] Maternal Follow-up study. Briefly discussed purpose and study visit expectations. Patient expressed interest in participation. Consent and initial visit scheduled in Clinical Trials Unit for Friday 9/25/23 at 10am. Notified patient of reminder call the day before and provided contact information should she have any questions or need to reschedule. Patient verbalized understanding.

## 2023-09-28 ENCOUNTER — TELEPHONE (OUTPATIENT)
Dept: RESEARCH | Facility: HOSPITAL | Age: 43
End: 2023-09-28
Payer: MEDICAID

## 2023-09-28 NOTE — TELEPHONE ENCOUNTER
[facility] Maternal F/U (2021.002)  Note:  Called Ms. Mckeon to confirm appointment tomorrow at 10am in Clinical Trials Unit. Patient confirmed she will be coming. Directions to CTU explained and encouraged patient to contact [facility] for assistance getting there or if she had any other questions. Patient verbalized understanding.

## 2023-09-29 ENCOUNTER — TELEPHONE (OUTPATIENT)
Dept: RESEARCH | Facility: HOSPITAL | Age: 43
End: 2023-09-29
Payer: MEDICAID

## 2023-09-29 NOTE — TELEPHONE ENCOUNTER
[facility] Maternal F/U (#5831.455):  Called patient after no-show for research appointment. No answr. Left voicemail with contact details and information on rescheduling.

## 2024-03-13 ENCOUNTER — ON-DEMAND VIRTUAL (OUTPATIENT)
Dept: URGENT CARE | Facility: CLINIC | Age: 44
End: 2024-03-13
Payer: MEDICAID

## 2024-03-13 DIAGNOSIS — B00.9 HERPES: Primary | ICD-10-CM

## 2024-03-13 PROCEDURE — 99203 OFFICE O/P NEW LOW 30 MIN: CPT | Mod: 95,,,

## 2024-03-13 RX ORDER — ACYCLOVIR 50 MG/G
CREAM TOPICAL
Qty: 5 G | Refills: 0 | Status: SHIPPED | OUTPATIENT
Start: 2024-03-13

## 2024-03-13 NOTE — PROGRESS NOTES
Subjective:      Patient ID: Emily Mckeon is a 43 y.o. female.    Vitals:  vitals were not taken for this visit.     Chief Complaint: vaginal sores      Visit Type: TELE AUDIOVISUAL    Present with the patient at the time of consultation: TELEMED PRESENT WITH PATIENT: None    Past Medical History:   Diagnosis Date    Chronic back pain     Elevated cholesterol     Fibroids     Hypertension     Impaired glucose in pregnancy, antepartum     Migraine      Past Surgical History:   Procedure Laterality Date     SECTION, CLASSIC      x4; all at term    CYST REMOVAL N/A 3/8/2019    Procedure: EXCISION, CYST;  Surgeon: Dora Villalba MD;  Location: [facility] OR;  Service: General;  Laterality: N/A;    LIPOSUCTION W/ FAT INJECTION      WRIST SURGERY       Review of patient's allergies indicates:   Allergen Reactions    Morphine Hives    Pcn [penicillins]     Penicillin Hives    Meperidine Hives and Rash     Current Outpatient Medications on File Prior to Visit   Medication Sig Dispense Refill    amLODIPine (NORVASC) 5 MG tablet Take 1 tablet (5 mg total) by mouth once daily. 30 tablet 11    azithromycin (ZITHROMAX Z-JOHANNE) 250 MG tablet Take 2 tablets (500 mg) on  Day 1,  followed by 1 tablet (250 mg) once daily on Days 2 through 5. 6 tablet 0    butalbital-acetaminophen-caffeine -40 mg (FIORICET, ESGIC) -40 mg per tablet Take by mouth.      clotrimazole-betamethasone 1-0.05% (LOTRISONE) cream Apply topically to the affected area(s) twice daily 60 g 3    cyclobenzaprine (FLEXERIL) 5 MG tablet TAKE 1 TABLET BY MOUTH TWO TIMES A DAY AS NEEDED FOR MUSCLE SPAMS 30 tablet 0    ergocalciferol (VITAMIN D2) 50,000 unit Cap Take 1 capsule by mouth weekly 4 capsule 4    fluticasone (FLONASE) 50 mcg/actuation nasal spray Use 1 spray into each nostril daily. 16 g 1    ibuprofen (ADVIL,MOTRIN) 800 MG tablet Take 1 tablet by mouth every 8 hours with food as needed for pain 30 tablet 0    methocarbamol (ROBAXIN)  750 MG Tab Take 500 mg by mouth 4 (four) times daily.      montelukast (SINGULAIR) 10 mg tablet Take 1 tablet (10 mg total) by mouth once daily. 30 tablet 2    naproxen (NAPROSYN) 500 MG tablet Take 500 mg by mouth 2 (two) times daily.      ondansetron (ZOFRAN) 4 MG tablet Take by mouth.      oxyCODONE-acetaminophen (PERCOCET) 5-325 mg per tablet TAKE 1 TABLET BY MOUTH EVERY 4 HOURS 12 tablet 0    predniSONE (DELTASONE) 20 MG tablet Take 20 mg by mouth once daily.      terbinafine HCl (LAMISIL) 250 mg tablet Take 1 tablet by mouth every day 21 tablet 0    topiramate (TOPAMAX) 25 MG tablet Take 1 tablet (25 mg total) by mouth once daily for migraine. 30 tablet 2    triamterene-hydrochlorothiazide 37.5-25 mg (MAXZIDE-25) 37.5-25 mg per tablet Take 1 tablet by mouth once daily. 30 tablet 1     Current Facility-Administered Medications on File Prior to Visit   Medication Dose Route Frequency Provider Last Rate Last Admin    lidocaine (PF) 10 mg/ml (1%) injection 10 mg  1 mL Intradermal Once Dora Villalba MD        lidocaine (PF) 10 mg/ml (1%) injection 10 mg  1 mL Intradermal Once Dora Villalba MD         Family History   Problem Relation Age of Onset    Breast cancer Neg Hx     Colon cancer Neg Hx     Ovarian cancer Neg Hx        Medications Ordered                [id number] [address] [address]    Telephone: [phone]   Fax: [phone]   Hours: Not open 24 hours                         E-Prescribed (1 of 1)              acyclovir 5% (ZOVIRAX) 5 % Crea    Sig: Apply topically 5 (five) times daily.       Start: 3/13/24     Quantity: 5 g Refills: 0                           Ohs Peq Odvv Intake    3/13/2024  6:08 PM CDT - Filed by Patient   What is your current physical address in the event of a medical emergency? [address]   Are you able to take your vital signs? No   Please attach  any relevant images or files          Patient states that yesterday she began to having a sore on her vaginal area. 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 Patient denies any other symptoms at this time. Patient states that she usually uses the valtrex cream and is requesting a refill        Constitution: Negative.   HENT: Negative.     Neck: neck negative.   Cardiovascular: Negative.    Eyes: Negative.    Respiratory: Negative.     Gastrointestinal: Negative.    Endocrine: negative.   Genitourinary:  Positive for genital sore.   Musculoskeletal: Negative.    Skin: Negative.    Neurological: Negative.    Hematologic/Lymphatic: Negative.    Psychiatric/Behavioral: Negative.          Objective:   The physical exam was conducted virtually.  Physical Exam   Constitutional: She is oriented to person, place, and time.   HENT:   Head: Normocephalic and atraumatic.   Nose: Nose normal.   Eyes: Conjunctivae are normal. Pupils are equal, round, and reactive to light. Extraocular movement intact   Neck: Neck supple.   Pulmonary/Chest: Effort normal.   Abdominal: Normal appearance.   Musculoskeletal: Normal range of motion.         General: Normal range of motion.   Neurological: no focal deficit. She is alert, oriented to person, place, and time and at baseline.   Skin: Skin is warm.   Psychiatric: Her behavior is normal. Mood, judgment and thought content normal.       Assessment:     1. Herpes        Plan:       Herpes  -     acyclovir 5% (ZOVIRAX) 5 % Crea; Apply topically 5 (five) times daily.  Dispense: 5 g; Refill: 0

## 2024-03-20 ENCOUNTER — ON-DEMAND VIRTUAL (OUTPATIENT)
Dept: URGENT CARE | Facility: CLINIC | Age: 44
End: 2024-03-20
Payer: MEDICAID

## 2024-03-20 DIAGNOSIS — B00.9 HERPES: Primary | ICD-10-CM

## 2024-03-20 PROCEDURE — 99499 UNLISTED E&M SERVICE: CPT | Mod: 95,,, | Performed by: NURSE PRACTITIONER

## 2024-03-20 NOTE — PROGRESS NOTES
Subjective:      Patient ID: Emily Mckeon is a 43 y.o. female.    Vitals:  vitals were not taken for this visit.     Chief Complaint: Medication Problem      Visit Type: TELE AUDIOVISUAL    Present with the patient at the time of consultation: TELEMED PRESENT WITH PATIENT: family member        Past Medical History:   Diagnosis Date    Chronic back pain     Elevated cholesterol     Fibroids     Hypertension     Impaired glucose in pregnancy, antepartum     Migraine      Past Surgical History:   Procedure Laterality Date     SECTION, CLASSIC      x4; all at term    CYST REMOVAL N/A 3/8/2019    Procedure: EXCISION, CYST;  Surgeon: Dora Villalba MD;  Location: [facility] OR;  Service: General;  Laterality: N/A;    LIPOSUCTION W/ FAT INJECTION      WRIST SURGERY       Review of patient's allergies indicates:   Allergen Reactions    Morphine Hives    Pcn [penicillins]     Penicillin Hives    Meperidine Hives and Rash     Current Outpatient Medications on File Prior to Visit   Medication Sig Dispense Refill    acyclovir 5% (ZOVIRAX) 5 % Crea Apply topically 5 (five) times daily. 5 g 0    amLODIPine (NORVASC) 5 MG tablet Take 1 tablet (5 mg total) by mouth once daily. 30 tablet 11    azithromycin (ZITHROMAX Z-JOHANNE) 250 MG tablet Take 2 tablets (500 mg) on  Day 1,  followed by 1 tablet (250 mg) once daily on Days 2 through 5. 6 tablet 0    butalbital-acetaminophen-caffeine -40 mg (FIORICET, ESGIC) -40 mg per tablet Take by mouth.      clotrimazole-betamethasone 1-0.05% (LOTRISONE) cream Apply topically to the affected area(s) twice daily 60 g 3    cyclobenzaprine (FLEXERIL) 5 MG tablet TAKE 1 TABLET BY MOUTH TWO TIMES A DAY AS NEEDED FOR MUSCLE SPAMS 30 tablet 0    ergocalciferol (VITAMIN D2) 50,000 unit Cap Take 1 capsule by mouth weekly 4 capsule 4    fluticasone (FLONASE) 50 mcg/actuation nasal spray Use 1 spray into each nostril daily. 16 g 1    ibuprofen (ADVIL,MOTRIN) 800 MG tablet Take 1  tablet by mouth every 8 hours with food as needed for pain 30 tablet 0    methocarbamol (ROBAXIN) 750 MG Tab Take 500 mg by mouth 4 (four) times daily.      montelukast (SINGULAIR) 10 mg tablet Take 1 tablet (10 mg total) by mouth once daily. 30 tablet 2    naproxen (NAPROSYN) 500 MG tablet Take 500 mg by mouth 2 (two) times daily.      ondansetron (ZOFRAN) 4 MG tablet Take by mouth.      oxyCODONE-acetaminophen (PERCOCET) 5-325 mg per tablet TAKE 1 TABLET BY MOUTH EVERY 4 HOURS 12 tablet 0    predniSONE (DELTASONE) 20 MG tablet Take 20 mg by mouth once daily.      terbinafine HCl (LAMISIL) 250 mg tablet Take 1 tablet by mouth every day 21 tablet 0    topiramate (TOPAMAX) 25 MG tablet Take 1 tablet (25 mg total) by mouth once daily for migraine. 30 tablet 2    triamterene-hydrochlorothiazide 37.5-25 mg (MAXZIDE-25) 37.5-25 mg per tablet Take 1 tablet by mouth once daily. 30 tablet 1     Current Facility-Administered Medications on File Prior to Visit   Medication Dose Route Frequency Provider Last Rate Last Admin    lidocaine (PF) 10 mg/ml (1%) injection 10 mg  1 mL Intradermal Once Dora Villalba MD        lidocaine (PF) 10 mg/ml (1%) injection 10 mg  1 mL Intradermal Once Dora Villalba MD         Family History   Problem Relation Age of Onset    Breast cancer Neg Hx     Colon cancer Neg Hx     Ovarian cancer Neg Hx            Ohs Peq Odvv Intake    3/20/2024  3:25 PM CDT - Filed by Patient   What is your current physical address in the event of a medical emergency? [address]   Are you able to take your vital signs? No   Please attach any relevant images or files          Patient was prescribed a cream for fever blisters and [facility] and states needs prior authorization.         Constitution: Negative.   HENT: Negative.     Cardiovascular: Negative.    Respiratory: Negative.     Gastrointestinal: Negative.    Endocrine: negative.   Genitourinary: Negative.  Negative for frequency and  urgency.   Musculoskeletal: Negative.    Skin: Negative.    Allergic/Immunologic: Negative.    Neurological: Negative.    Hematologic/Lymphatic: Negative.    Psychiatric/Behavioral: Negative.          Objective:   The physical exam was conducted virtually.  LOCATION OF PATIENT in car  Physical Exam   Constitutional: She is oriented to person, place, and time. She appears well-developed.   HENT:   Head: Normocephalic and atraumatic.   Ears:   Right Ear: Hearing, tympanic membrane and external ear normal.   Left Ear: Hearing, tympanic membrane and external ear normal.   Nose: Nose normal.   Mouth/Throat: Uvula is midline, oropharynx is clear and moist and mucous membranes are normal.   Eyes: Conjunctivae and EOM are normal. Pupils are equal, round, and reactive to light.   Neck: Neck supple.   Cardiovascular: Normal rate.   Pulmonary/Chest: Effort normal and breath sounds normal.   Musculoskeletal: Normal range of motion.         General: Normal range of motion.   Neurological: She is alert and oriented to person, place, and time.   Skin: Skin is warm.   Psychiatric: Her behavior is normal. Thought content normal.   Nursing note and vitals reviewed.      Assessment:     1. Herpes        Plan:     Explained to patient she will need to contact provider to do prior authorization  Herpes

## 2024-11-26 PROBLEM — R07.89 ATYPICAL CHEST PAIN: Status: RESOLVED | Noted: 2024-11-26 | Resolved: 2024-11-26

## 2024-11-26 PROBLEM — R07.9 CHEST PAIN: Status: RESOLVED | Noted: 2024-11-26 | Resolved: 2024-11-26

## 2024-11-26 PROBLEM — O10.919 CHRONIC HYPERTENSION AFFECTING PREGNANCY: Status: RESOLVED | Noted: 2017-09-20 | Resolved: 2024-11-26

## 2024-11-26 PROBLEM — I16.0 HYPERTENSIVE URGENCY: Status: ACTIVE | Noted: 2024-11-26

## 2024-11-26 PROBLEM — R07.9 CHEST PAIN: Status: ACTIVE | Noted: 2024-11-26

## 2024-11-26 PROBLEM — E87.6 HYPOKALEMIA: Status: RESOLVED | Noted: 2024-11-26 | Resolved: 2024-11-26

## 2024-11-26 PROBLEM — I16.0 HYPERTENSIVE URGENCY: Status: RESOLVED | Noted: 2024-11-26 | Resolved: 2024-11-26

## 2024-11-26 PROBLEM — E87.6 HYPOKALEMIA: Status: ACTIVE | Noted: 2024-11-26

## 2024-11-26 PROBLEM — E78.2 MIXED HYPERLIPIDEMIA: Status: ACTIVE | Noted: 2024-11-26

## 2024-11-26 PROBLEM — R07.89 ATYPICAL CHEST PAIN: Status: ACTIVE | Noted: 2024-11-26

## 2025-07-29 ENCOUNTER — TELEPHONE (OUTPATIENT)
Dept: CARDIOLOGY | Facility: CLINIC | Age: 45
End: 2025-07-29
Payer: MEDICAID

## 2025-07-29 NOTE — TELEPHONE ENCOUNTER
Copied from CRM #[id number]. Topic: Appointments - Hospital Follow Up  >> Jul 29, 2025  8:17 AM Alecia wrote:  Pt is calling to reschedule her hosp f/u she missed on 3/31 No appt in  [facility] pl[facility] call pt at  [phone]

## 2025-07-29 NOTE — TELEPHONE ENCOUNTER
Spoke with patient, informed that WALI Pelletier is currently the only cardiology provider at our [place] office.  Her first available appointment is in November.  I checked the [facility] office, but there was no availability.  Patient accepted November appointment and placed on wait list.  Advised patient to contact PCP to discuss blood pressure medications while waiting to see WALI Pelletier.  Patient verbalized understanding.

## (undated) DEVICE — NDL 22GA X1 1/2 REG BEVEL

## (undated) DEVICE — DRESSING TRANS 4X4 3/4

## (undated) DEVICE — SUT VICRYL 3-0 27 SH

## (undated) DEVICE — PACK BASIC

## (undated) DEVICE — SEE MEDLINE ITEM 157117

## (undated) DEVICE — SPONGE DERMA 8PLY 2X2

## (undated) DEVICE — SEE MEDLINE ITEM 156955

## (undated) DEVICE — SEE MEDLINE ITEM 157116

## (undated) DEVICE — DRESSING ADH ISLAND 3.6 X 14

## (undated) DEVICE — COVER OVERHEAD SURG LT BLUE

## (undated) DEVICE — SUT MCRYL PLUS 4-0 PS2 27IN

## (undated) DEVICE — SEE MEDLINE ITEM 157148

## (undated) DEVICE — GLOVE BIOGEL ECLIPSE SZ 7.5

## (undated) DEVICE — GAUZE SPONGE 4X4 12PLY

## (undated) DEVICE — SYR B-D DISP CONTROL 10CC100/C

## (undated) DEVICE — SUT 4-0 ETHILON 18 PS-2

## (undated) DEVICE — DRESSING XEROFORM 1X8IN

## (undated) DEVICE — SEE MEDLINE ITEM 152622

## (undated) DEVICE — PAD PREP 50/CA